# Patient Record
Sex: FEMALE | Race: WHITE | NOT HISPANIC OR LATINO | Employment: OTHER | URBAN - METROPOLITAN AREA
[De-identification: names, ages, dates, MRNs, and addresses within clinical notes are randomized per-mention and may not be internally consistent; named-entity substitution may affect disease eponyms.]

---

## 2017-08-23 ENCOUNTER — HOSPITAL ENCOUNTER (EMERGENCY)
Facility: HOSPITAL | Age: 82
Discharge: HOME | End: 2017-08-24
Attending: EMERGENCY MEDICINE | Admitting: EMERGENCY MEDICINE

## 2017-08-23 DIAGNOSIS — R53.1 WEAKNESS: Primary | ICD-10-CM

## 2017-08-23 PROCEDURE — 99284 EMERGENCY DEPT VISIT MOD MDM: CPT

## 2017-08-23 RX ORDER — GALANTAMINE HYDROBROMIDE 8 MG/1
8 TABLET, FILM COATED ORAL DAILY
COMMUNITY

## 2017-08-24 VITALS
OXYGEN SATURATION: 99 % | RESPIRATION RATE: 18 BRPM | BODY MASS INDEX: 23.41 KG/M2 | WEIGHT: 124 LBS | TEMPERATURE: 96.3 F | SYSTOLIC BLOOD PRESSURE: 144 MMHG | DIASTOLIC BLOOD PRESSURE: 75 MMHG | HEIGHT: 61 IN | HEART RATE: 85 BPM

## 2017-08-24 NOTE — ED PROVIDER NOTES
" EMERGENCY DEPARTMENT ENCOUNTER    CHIEF COMPLAINT  Chief Complaint: Fatigue  History given by: patient   History limited by: vague historian   Room Number: 17/17  PMD: Noemi Chester MD      HPI:  Pt is a 89 y.o. female who presents complaining of fatigue. Pt states that she has been in bed all day. Pt states that she feels sick in her head and in her stomach. Pt states that she has not vomited, and is unable to elaborate other than she \"just feels sick\". Pt states that she started was started on Aricept 2 weeks ago, but she has not taken it regularly. Pt states that she took the medication this morning along with \"a bunch of vitamins\". Pt denies any pain at this time.     Onset: gradual  Timing: constant   Location: n/a  Radiation: n/a  Quality: feels sick in her head and abd   Intensity/Severity: moderate  Progression: unchanged   Associated Symptoms: none  Aggravating Factors: none  Alleviating Factors: none  Previous Episodes: none  Treatment before arrival: started on Aricept    PAST MEDICAL HISTORY  Active Ambulatory Problems     Diagnosis Date Noted   • No Active Ambulatory Problems     Resolved Ambulatory Problems     Diagnosis Date Noted   • No Resolved Ambulatory Problems     Past Medical History:   Diagnosis Date   • Prediabetes        PAST SURGICAL HISTORY  Past Surgical History:   Procedure Laterality Date   • BACK SURGERY     • KNEE SURGERY         FAMILY HISTORY  History reviewed. No pertinent family history.    SOCIAL HISTORY  Social History     Social History   • Marital status:      Spouse name: N/A   • Number of children: N/A   • Years of education: N/A     Occupational History   • Not on file.     Social History Main Topics   • Smoking status: Never Smoker   • Smokeless tobacco: Not on file   • Alcohol use No   • Drug use: No   • Sexual activity: Defer     Other Topics Concern   • Not on file     Social History Narrative   • No narrative on file       ALLERGIES  Review of patient's " allergies indicates no known allergies.    REVIEW OF SYSTEMS  Review of Systems   Constitutional: Positive for fatigue (feels sick). Negative for chills and fever.   HENT: Negative for sore throat.    Respiratory: Negative for cough and shortness of breath.    Cardiovascular: Negative for chest pain.   Gastrointestinal: Negative for abdominal pain, nausea and vomiting.   Genitourinary: Negative for dysuria.   Musculoskeletal: Negative for back pain.   Psychiatric/Behavioral: The patient is not nervous/anxious.        PHYSICAL EXAM  ED Triage Vitals   Temp Heart Rate Resp BP SpO2   08/23/17 2138 08/23/17 2138 08/23/17 2138 08/23/17 2139 08/23/17 2138   96.3 °F (35.7 °C) 93 18 145/86 100 %      Temp src Heart Rate Source Patient Position BP Location FiO2 (%)   08/23/17 2138 08/23/17 2138 08/23/17 2139 08/23/17 2139 --   Tympanic Monitor Sitting Left arm        Physical Exam   Constitutional: She is oriented to person, place, and time and well-developed, well-nourished, and in no distress. No distress.   HENT:   Head: Normocephalic and atraumatic.   Mouth/Throat: Mucous membranes are dry.   Eyes: EOM are normal. Pupils are equal, round, and reactive to light.   Neck: Normal range of motion. Neck supple.   Cardiovascular: Normal rate, regular rhythm and normal heart sounds.    Pulmonary/Chest: Effort normal and breath sounds normal. No respiratory distress.   Abdominal: Soft. There is no tenderness. There is no rebound and no guarding.   Musculoskeletal: Normal range of motion. She exhibits no edema.   Neurological: She is alert and oriented to person, place, and time. She has normal sensation and normal strength.   Skin: Skin is warm and dry. No rash noted.   Psychiatric: Mood and affect normal. Her mood appears anxious.   Nursing note and vitals reviewed.    PROCEDURES  Procedures      PROGRESS AND CONSULTS  ED Course     23:36  BP- 144/67 HR- 82 Temp- 96.3 °F (35.7 °C) (Tympanic) O2 sat- 100%  Advised pt that is her  sx are due to a medication reaction, there is nothing to do for the sx. Plan to check orthostatic and feed pt. Pt understands and agrees with the plan, all questions answered.    23;38  Orthostatics ordered.     00:41  BP- 144/75 HR- 85 Temp- 96.3 °F (35.7 °C) (Tympanic) O2 sat- 99%  Rechecked the patient who is in NAD and is resting comfortably. Pt is not orthostatic, and reports that she felt that she had more strength upon standing. Pt will be discharged. Pt understands and agrees with the plan, all questions answered.    MEDICAL DECISION MAKING  Results were reviewed/discussed with the patient and they were also made aware of online access. Pt also made aware that some labs, such as cultures, will not be resulted during ER visit and follow up with PMD is necessary.     MDM  Number of Diagnoses or Management Options  Weakness:   Patient Progress  Patient progress: stable         DIAGNOSIS  Final diagnoses:   Weakness       DISPOSITION  DISCHARGE    Patient discharged in stable condition.    Reviewed implications of results, diagnosis, meds, responsibility to follow up, warning signs and symptoms of possible worsening, potential complications and reasons to return to ER.    Patient/Family voiced understanding of above instructions.    Discussed plan for discharge, as there is no emergent indication for admission.  Pt/family is agreeable and understands need for follow up and repeat testing.  Pt is aware that discharge does not mean that nothing is wrong but it indicates no emergency is present that requires admission and they must continue care with follow-up as given below or physician of their choice.     FOLLOW-UP  Noemi Chester MD  [address] [id number] [phone]               Medication List      Notice     No changes were made to your prescriptions during this visit.        Latest Documented Vital Signs:  As of 12:43 AM  BP- 144/75 HR- 85 Temp- 96.3 °F (35.7 °C) (Tympanic) O2 sat-  99%    --  Documentation assistance provided by adriel Mitchell for Dr Nelson.  Information recorded by the adriel was done at my direction and has been verified and validated by me.          Destinee Mitchell  08/24/17 0043       Nathaniel Nelson MD  08/24/17 0127

## 2017-08-24 NOTE — ED TRIAGE NOTES
Patient states that she just feels awful that she has had a change in her medications and states that she just feels sick in her head. I asked if she was in pain she stated no she just feels sick all over.

## 2020-05-23 ENCOUNTER — APPOINTMENT (OUTPATIENT)
Dept: GENERAL RADIOLOGY | Facility: HOSPITAL | Age: 85
End: 2020-05-23

## 2020-05-23 ENCOUNTER — HOSPITAL ENCOUNTER (EMERGENCY)
Facility: HOSPITAL | Age: 85
Discharge: HOME | End: 2020-05-23
Attending: EMERGENCY MEDICINE | Admitting: EMERGENCY MEDICINE

## 2020-05-23 ENCOUNTER — APPOINTMENT (OUTPATIENT)
Dept: CT IMAGING | Facility: HOSPITAL | Age: 85
End: 2020-05-23

## 2020-05-23 VITALS
RESPIRATION RATE: 16 BRPM | DIASTOLIC BLOOD PRESSURE: 70 MMHG | SYSTOLIC BLOOD PRESSURE: 129 MMHG | TEMPERATURE: 97.5 F | HEART RATE: 76 BPM | OXYGEN SATURATION: 98 %

## 2020-05-23 DIAGNOSIS — S52.551A OTHER CLOSED EXTRA-ARTICULAR FRACTURE OF DISTAL END OF RIGHT RADIUS, INITIAL ENCOUNTER: Primary | ICD-10-CM
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
## 2020-05-23 PROCEDURE — 73110 X-RAY EXAM OF WRIST: CPT

## 2020-05-23 PROCEDURE — 70450 CT HEAD/BRAIN W/O DYE: CPT

## 2020-05-23 PROCEDURE — 99284 EMERGENCY DEPT VISIT MOD MDM: CPT

## 2020-05-23 PROCEDURE — 73130 X-RAY EXAM OF HAND: CPT

## 2020-05-23 RX ORDER — HYDROCODONE BITARTRATE AND ACETAMINOPHEN 5; 325 MG/1; MG/1
1 TABLET ORAL EVERY 6 HOURS PRN
Status: DISCONTINUED | OUTPATIENT
Start: 2020-05-23 | End: 2020-05-23 | Stop reason: HOSPADM

## 2020-05-23 RX ADMIN — HYDROCODONE BITARTRATE AND ACETAMINOPHEN 1 TABLET: 5; 325 TABLET ORAL at 15:07
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
## 2020-05-23 NOTE — PROGRESS NOTES
Entered room, introduced self and explained role w/mask in place/; Verified information on facesheet; patient advises her daughter Bonita Cuadra can probably pick her up. Calls placed to emergency contacts with no answer. Left message; Patients son Joe called back to confirm daughters phone number. Call to home phone number has no answer, left message. Will continue to work with patient. Keiry Villarreal RN

## 2020-05-23 NOTE — DISCHARGE INSTRUCTIONS
Follow-up with Dr. Gregorio of orthopedics.  Call his office on Monday to schedule an appointment.  Wear splint at all times.  Apply ice as needed.  Elevate your right arm.  Take Tylenol as needed for pain.  Return to the emergency department for worsening pain, numbness in your fingers, or other concern.

## 2020-05-23 NOTE — PROGRESS NOTES
Discharge Planning Assessment  [facility]     Patient Name: Marcella Stephens  MRN: [id number]  Today's Date: 5/23/2020    Admit Date: 5/23/2020    Discharge Needs Assessment    No documentation.       Discharge Plan    No documentation.       Destination      Coordination has not been started for this encounter.      Durable Medical Equipment      Coordination has not been started for this encounter.      Dialysis/Infusion      Coordination has not been started for this encounter.      Home Medical Care      Coordination has not been started for this encounter.      Therapy      Coordination has not been started for this encounter.      Community Resources      Coordination has not been started for this encounter.          Demographic Summary    No documentation.       Functional Status    No documentation.       Psychosocial    No documentation.       Abuse/Neglect    No documentation.       Legal    No documentation.       Substance Abuse    No documentation.       Patient Forms    No documentation.           Keiry Villarreal RN

## 2020-05-23 NOTE — ED NOTES
Pt tripped at home and fell on the floor. Pt has deformity of right wrist. Pt was given 50mg Ketamine IM by EMS.     Pt arrived to ER wearing a facial mask.     Ally Claudio RN  05/23/20 5370

## 2020-05-23 NOTE — PROGRESS NOTES
No return call from family. Patient advised she doesn't have any money with her; however she is ambulatory- offered to cab patient home; Patient accepted./ Scheduled cab to transport patient home- provided cab voucher. Assisted patient to cab via w/c. No further needs at this time; - son Joe will be meeting patient at home to assist into house. Keiry Villarreal RN

## 2021-08-27 ENCOUNTER — APPOINTMENT (OUTPATIENT)
Dept: GENERAL RADIOLOGY | Facility: HOSPITAL | Age: 86
End: 2021-08-27

## 2021-08-27 ENCOUNTER — HOSPITAL ENCOUNTER (INPATIENT)
Facility: HOSPITAL | Age: 86
LOS: 8 days | Discharge: NURSING, REHAB OR LONG-TERM CARE | End: 2021-09-04
Attending: EMERGENCY MEDICINE | Admitting: INTERNAL MEDICINE

## 2021-08-27 DIAGNOSIS — T79.6XXA TRAUMATIC RHABDOMYOLYSIS, INITIAL ENCOUNTER (HCC): ICD-10-CM

## 2021-08-27 DIAGNOSIS — S72.001A CLOSED FRACTURE OF RIGHT HIP, INITIAL ENCOUNTER (HCC): Primary | ICD-10-CM

## 2021-08-27 LAB
ABO GROUP BLD: NORMAL
ALBUMIN SERPL-MCNC: 3.3 G/DL (ref 3.5–5.2)
ALBUMIN/GLOB SERPL: 1.1 G/DL
ALP SERPL-CCNC: 61 U/L (ref 39–117)
ALT SERPL W P-5'-P-CCNC: 20 U/L (ref 1–33)
ANION GAP SERPL CALCULATED.3IONS-SCNC: 11.5 MMOL/L (ref 5–15)
AST SERPL-CCNC: 38 U/L (ref 1–32)
BASOPHILS # BLD AUTO: 0.01 10*3/MM3 (ref 0–0.2)
BASOPHILS NFR BLD AUTO: 0.1 % (ref 0–1.5)
BILIRUB SERPL-MCNC: 1.4 MG/DL (ref 0–1.2)
BLD GP AB SCN SERPL QL: NEGATIVE
BUN SERPL-MCNC: 37 MG/DL (ref 8–23)
BUN/CREAT SERPL: 50.7 (ref 7–25)
CALCIUM SPEC-SCNC: 9.2 MG/DL (ref 8.2–9.6)
CHLORIDE SERPL-SCNC: 101 MMOL/L (ref 98–107)
CK SERPL-CCNC: 1273 U/L (ref 20–180)
CO2 SERPL-SCNC: 26.5 MMOL/L (ref 22–29)
CREAT SERPL-MCNC: 0.73 MG/DL (ref 0.57–1)
DEPRECATED RDW RBC AUTO: 44.3 FL (ref 37–54)
EOSINOPHIL # BLD AUTO: 0 10*3/MM3 (ref 0–0.4)
EOSINOPHIL NFR BLD AUTO: 0 % (ref 0.3–6.2)
ERYTHROCYTE [DISTWIDTH] IN BLOOD BY AUTOMATED COUNT: 14.1 % (ref 12.3–15.4)
GFR SERPL CREATININE-BSD FRML MDRD: 74 ML/MIN/1.73
GLOBULIN UR ELPH-MCNC: 3 GM/DL
GLUCOSE SERPL-MCNC: 117 MG/DL (ref 65–99)
HCT VFR BLD AUTO: 31.9 % (ref 34–46.6)
HGB BLD-MCNC: 10.5 G/DL (ref 12–15.9)
IMM GRANULOCYTES # BLD AUTO: 0.05 10*3/MM3 (ref 0–0.05)
IMM GRANULOCYTES NFR BLD AUTO: 0.6 % (ref 0–0.5)
INR PPP: 1.1 (ref 0.9–1.1)
LYMPHOCYTES # BLD AUTO: 0.97 10*3/MM3 (ref 0.7–3.1)
LYMPHOCYTES NFR BLD AUTO: 10.7 % (ref 19.6–45.3)
MCH RBC QN AUTO: 28.9 PG (ref 26.6–33)
MCHC RBC AUTO-ENTMCNC: 32.9 G/DL (ref 31.5–35.7)
MCV RBC AUTO: 87.9 FL (ref 79–97)
MONOCYTES # BLD AUTO: 0.76 10*3/MM3 (ref 0.1–0.9)
MONOCYTES NFR BLD AUTO: 8.4 % (ref 5–12)
NEUTROPHILS NFR BLD AUTO: 7.26 10*3/MM3 (ref 1.7–7)
NEUTROPHILS NFR BLD AUTO: 80.2 % (ref 42.7–76)
NRBC BLD AUTO-RTO: 0 /100 WBC (ref 0–0.2)
PLATELET # BLD AUTO: 159 10*3/MM3 (ref 140–450)
PMV BLD AUTO: 10.8 FL (ref 6–12)
POTASSIUM SERPL-SCNC: 4.4 MMOL/L (ref 3.5–5.2)
PROT SERPL-MCNC: 6.3 G/DL (ref 6–8.5)
PROTHROMBIN TIME: 14 SECONDS (ref 11.7–14.2)
RBC # BLD AUTO: 3.63 10*6/MM3 (ref 3.77–5.28)
RH BLD: POSITIVE
SARS-COV-2 RNA RESP QL NAA+PROBE: NOT DETECTED
SODIUM SERPL-SCNC: 139 MMOL/L (ref 136–145)
T&S EXPIRATION DATE: NORMAL
WBC # BLD AUTO: 9.05 10*3/MM3 (ref 3.4–10.8)

## 2021-08-27 PROCEDURE — 73110 X-RAY EXAM OF WRIST: CPT

## 2021-08-27 PROCEDURE — 73070 X-RAY EXAM OF ELBOW: CPT

## 2021-08-27 PROCEDURE — 85610 PROTHROMBIN TIME: CPT | Performed by: EMERGENCY MEDICINE

## 2021-08-27 PROCEDURE — 93005 ELECTROCARDIOGRAM TRACING: CPT | Performed by: EMERGENCY MEDICINE

## 2021-08-27 PROCEDURE — 82550 ASSAY OF CK (CPK): CPT | Performed by: EMERGENCY MEDICINE

## 2021-08-27 PROCEDURE — 93010 ELECTROCARDIOGRAM REPORT: CPT | Performed by: INTERNAL MEDICINE

## 2021-08-27 PROCEDURE — 73552 X-RAY EXAM OF FEMUR 2/>: CPT

## 2021-08-27 PROCEDURE — 80053 COMPREHEN METABOLIC PANEL: CPT | Performed by: EMERGENCY MEDICINE

## 2021-08-27 PROCEDURE — 25010000002 HYDROMORPHONE PER 4 MG: Performed by: EMERGENCY MEDICINE

## 2021-08-27 PROCEDURE — U0003 INFECTIOUS AGENT DETECTION BY NUCLEIC ACID (DNA OR RNA); SEVERE ACUTE RESPIRATORY SYNDROME CORONAVIRUS 2 (SARS-COV-2) (CORONAVIRUS DISEASE [COVID-19]), AMPLIFIED PROBE TECHNIQUE, MAKING USE OF HIGH THROUGHPUT TECHNOLOGIES AS DESCRIBED BY CMS-2020-01-R: HCPCS | Performed by: EMERGENCY MEDICINE

## 2021-08-27 PROCEDURE — 86901 BLOOD TYPING SEROLOGIC RH(D): CPT | Performed by: EMERGENCY MEDICINE

## 2021-08-27 PROCEDURE — 86900 BLOOD TYPING SEROLOGIC ABO: CPT | Performed by: EMERGENCY MEDICINE

## 2021-08-27 PROCEDURE — 99284 EMERGENCY DEPT VISIT MOD MDM: CPT

## 2021-08-27 PROCEDURE — U0005 INFEC AGEN DETEC AMPLI PROBE: HCPCS | Performed by: EMERGENCY MEDICINE

## 2021-08-27 PROCEDURE — 85025 COMPLETE CBC W/AUTO DIFF WBC: CPT | Performed by: EMERGENCY MEDICINE

## 2021-08-27 PROCEDURE — 25010000002 ONDANSETRON PER 1 MG: Performed by: EMERGENCY MEDICINE

## 2021-08-27 PROCEDURE — 86850 RBC ANTIBODY SCREEN: CPT | Performed by: EMERGENCY MEDICINE

## 2021-08-27 RX ORDER — SODIUM CHLORIDE 0.9 % (FLUSH) 0.9 %
10 SYRINGE (ML) INJECTION AS NEEDED
Status: DISCONTINUED | OUTPATIENT
Start: 2021-08-27 | End: 2021-09-04 | Stop reason: HOSPADM

## 2021-08-27 RX ORDER — NALOXONE HCL 0.4 MG/ML
0.4 VIAL (ML) INJECTION
Status: DISCONTINUED | OUTPATIENT
Start: 2021-08-27 | End: 2021-09-04 | Stop reason: HOSPADM

## 2021-08-27 RX ORDER — ACETAMINOPHEN 325 MG/1
650 TABLET ORAL EVERY 4 HOURS PRN
Status: DISCONTINUED | OUTPATIENT
Start: 2021-08-27 | End: 2021-09-04 | Stop reason: HOSPADM

## 2021-08-27 RX ORDER — CEFAZOLIN SODIUM 2 G/100ML
2 INJECTION, SOLUTION INTRAVENOUS ONCE
Status: COMPLETED | OUTPATIENT
Start: 2021-08-27 | End: 2021-08-28

## 2021-08-27 RX ORDER — HYDROMORPHONE HYDROCHLORIDE 1 MG/ML
0.5 INJECTION, SOLUTION INTRAMUSCULAR; INTRAVENOUS; SUBCUTANEOUS ONCE
Status: COMPLETED | OUTPATIENT
Start: 2021-08-27 | End: 2021-08-27

## 2021-08-27 RX ORDER — HYDROMORPHONE HYDROCHLORIDE 1 MG/ML
0.5 INJECTION, SOLUTION INTRAMUSCULAR; INTRAVENOUS; SUBCUTANEOUS
Status: DISPENSED | OUTPATIENT
Start: 2021-08-27 | End: 2021-09-03

## 2021-08-27 RX ORDER — ONDANSETRON 2 MG/ML
4 INJECTION INTRAMUSCULAR; INTRAVENOUS EVERY 6 HOURS PRN
Status: DISCONTINUED | OUTPATIENT
Start: 2021-08-27 | End: 2021-09-04 | Stop reason: HOSPADM

## 2021-08-27 RX ORDER — ONDANSETRON 2 MG/ML
4 INJECTION INTRAMUSCULAR; INTRAVENOUS ONCE
Status: COMPLETED | OUTPATIENT
Start: 2021-08-27 | End: 2021-08-27

## 2021-08-27 RX ORDER — ONDANSETRON 4 MG/1
4 TABLET, FILM COATED ORAL EVERY 6 HOURS PRN
Status: DISCONTINUED | OUTPATIENT
Start: 2021-08-27 | End: 2021-09-04 | Stop reason: HOSPADM

## 2021-08-27 RX ORDER — HYDROCODONE BITARTRATE AND ACETAMINOPHEN 5; 325 MG/1; MG/1
1 TABLET ORAL EVERY 4 HOURS PRN
Status: DISCONTINUED | OUTPATIENT
Start: 2021-08-27 | End: 2021-09-04 | Stop reason: HOSPADM

## 2021-08-27 RX ORDER — UREA 10 %
3 LOTION (ML) TOPICAL NIGHTLY PRN
Status: DISCONTINUED | OUTPATIENT
Start: 2021-08-27 | End: 2021-09-04 | Stop reason: HOSPADM

## 2021-08-27 RX ORDER — SODIUM CHLORIDE 9 MG/ML
75 INJECTION, SOLUTION INTRAVENOUS CONTINUOUS
Status: DISCONTINUED | OUTPATIENT
Start: 2021-08-27 | End: 2021-08-29

## 2021-08-27 RX ADMIN — SODIUM CHLORIDE 75 ML/HR: 9 INJECTION, SOLUTION INTRAVENOUS at 20:05

## 2021-08-27 RX ADMIN — SODIUM CHLORIDE 1000 ML: 9 INJECTION, SOLUTION INTRAVENOUS at 16:37

## 2021-08-27 RX ADMIN — HYDROMORPHONE HYDROCHLORIDE 0.5 MG: 1 INJECTION, SOLUTION INTRAMUSCULAR; INTRAVENOUS; SUBCUTANEOUS at 17:12

## 2021-08-27 RX ADMIN — ONDANSETRON 4 MG: 2 INJECTION INTRAMUSCULAR; INTRAVENOUS at 17:12

## 2021-08-28 ENCOUNTER — APPOINTMENT (OUTPATIENT)
Dept: GENERAL RADIOLOGY | Facility: HOSPITAL | Age: 86
End: 2021-08-28

## 2021-08-28 ENCOUNTER — ANESTHESIA EVENT (OUTPATIENT)
Dept: PERIOP | Facility: HOSPITAL | Age: 86
End: 2021-08-28

## 2021-08-28 ENCOUNTER — ANESTHESIA (OUTPATIENT)
Dept: PERIOP | Facility: HOSPITAL | Age: 86
End: 2021-08-28

## 2021-08-28 PROBLEM — T79.6XXA TRAUMATIC RHABDOMYOLYSIS (HCC): Status: ACTIVE | Noted: 2021-08-28

## 2021-08-28 LAB
ANION GAP SERPL CALCULATED.3IONS-SCNC: 9.8 MMOL/L (ref 5–15)
BUN SERPL-MCNC: 44 MG/DL (ref 8–23)
BUN/CREAT SERPL: 63.8 (ref 7–25)
CALCIUM SPEC-SCNC: 8.8 MG/DL (ref 8.2–9.6)
CHLORIDE SERPL-SCNC: 105 MMOL/L (ref 98–107)
CK SERPL-CCNC: 654 U/L (ref 20–180)
CK SERPL-CCNC: 851 U/L (ref 20–180)
CO2 SERPL-SCNC: 24.2 MMOL/L (ref 22–29)
CREAT SERPL-MCNC: 0.69 MG/DL (ref 0.57–1)
DEPRECATED RDW RBC AUTO: 46.1 FL (ref 37–54)
ERYTHROCYTE [DISTWIDTH] IN BLOOD BY AUTOMATED COUNT: 14.3 % (ref 12.3–15.4)
GFR SERPL CREATININE-BSD FRML MDRD: 79 ML/MIN/1.73
GLUCOSE SERPL-MCNC: 98 MG/DL (ref 65–99)
HCT VFR BLD AUTO: 31.1 % (ref 34–46.6)
HGB BLD-MCNC: 10.3 G/DL (ref 12–15.9)
MCH RBC QN AUTO: 29.4 PG (ref 26.6–33)
MCHC RBC AUTO-ENTMCNC: 33.1 G/DL (ref 31.5–35.7)
MCV RBC AUTO: 88.9 FL (ref 79–97)
PLATELET # BLD AUTO: 148 10*3/MM3 (ref 140–450)
PMV BLD AUTO: 10.6 FL (ref 6–12)
POTASSIUM SERPL-SCNC: 4.3 MMOL/L (ref 3.5–5.2)
QT INTERVAL: 375 MS
RBC # BLD AUTO: 3.5 10*6/MM3 (ref 3.77–5.28)
SODIUM SERPL-SCNC: 139 MMOL/L (ref 136–145)
WBC # BLD AUTO: 7.46 10*3/MM3 (ref 3.4–10.8)

## 2021-08-28 PROCEDURE — 73502 X-RAY EXAM HIP UNI 2-3 VIEWS: CPT | Performed by: ORTHOPAEDIC SURGERY

## 2021-08-28 PROCEDURE — 80048 BASIC METABOLIC PNL TOTAL CA: CPT | Performed by: INTERNAL MEDICINE

## 2021-08-28 PROCEDURE — 25010000002 NEOSTIGMINE 5 MG/10ML SOLUTION: Performed by: NURSE ANESTHETIST, CERTIFIED REGISTERED

## 2021-08-28 PROCEDURE — C1713 ANCHOR/SCREW BN/BN,TIS/BN: HCPCS | Performed by: ORTHOPAEDIC SURGERY

## 2021-08-28 PROCEDURE — 99222 1ST HOSP IP/OBS MODERATE 55: CPT | Performed by: ORTHOPAEDIC SURGERY

## 2021-08-28 PROCEDURE — 97166 OT EVAL MOD COMPLEX 45 MIN: CPT

## 2021-08-28 PROCEDURE — 25010000002 PHENYLEPHRINE PER 1 ML: Performed by: NURSE ANESTHETIST, CERTIFIED REGISTERED

## 2021-08-28 PROCEDURE — 25010000002 HYDROMORPHONE PER 4 MG: Performed by: INTERNAL MEDICINE

## 2021-08-28 PROCEDURE — 76000 FLUOROSCOPY <1 HR PHYS/QHP: CPT

## 2021-08-28 PROCEDURE — 25010000002 PROPOFOL 10 MG/ML EMULSION: Performed by: NURSE ANESTHETIST, CERTIFIED REGISTERED

## 2021-08-28 PROCEDURE — 82550 ASSAY OF CK (CPK): CPT | Performed by: INTERNAL MEDICINE

## 2021-08-28 PROCEDURE — 25010000003 LIDOCAINE 1 % SOLUTION 20 ML VIAL: Performed by: ORTHOPAEDIC SURGERY

## 2021-08-28 PROCEDURE — 73502 X-RAY EXAM HIP UNI 2-3 VIEWS: CPT

## 2021-08-28 PROCEDURE — 25010000002 FENTANYL CITRATE (PF) 50 MCG/ML SOLUTION: Performed by: NURSE ANESTHETIST, CERTIFIED REGISTERED

## 2021-08-28 PROCEDURE — 85027 COMPLETE CBC AUTOMATED: CPT | Performed by: INTERNAL MEDICINE

## 2021-08-28 PROCEDURE — C1769 GUIDE WIRE: HCPCS | Performed by: ORTHOPAEDIC SURGERY

## 2021-08-28 PROCEDURE — 25010000002 HYDROMORPHONE PER 4 MG: Performed by: NURSE ANESTHETIST, CERTIFIED REGISTERED

## 2021-08-28 PROCEDURE — 0QS606Z REPOSITION RIGHT UPPER FEMUR WITH INTRAMEDULLARY INTERNAL FIXATION DEVICE, OPEN APPROACH: ICD-10-PCS | Performed by: INTERNAL MEDICINE

## 2021-08-28 PROCEDURE — 27245 TREAT THIGH FRACTURE: CPT | Performed by: ORTHOPAEDIC SURGERY

## 2021-08-28 PROCEDURE — 25010000003 CEFAZOLIN IN DEXTROSE 2-4 GM/100ML-% SOLUTION: Performed by: ORTHOPAEDIC SURGERY

## 2021-08-28 PROCEDURE — 97530 THERAPEUTIC ACTIVITIES: CPT

## 2021-08-28 PROCEDURE — 25010000002 DEXAMETHASONE PER 1 MG: Performed by: NURSE ANESTHETIST, CERTIFIED REGISTERED

## 2021-08-28 PROCEDURE — 25010000002 ONDANSETRON PER 1 MG: Performed by: NURSE ANESTHETIST, CERTIFIED REGISTERED

## 2021-08-28 DEVICE — K-WIRE: Type: IMPLANTABLE DEVICE | Site: TROCHANTER | Status: FUNCTIONAL

## 2021-08-28 DEVICE — LOCKING SCREW, FULLY THREADED: Type: IMPLANTABLE DEVICE | Site: TROCHANTER | Status: FUNCTIONAL

## 2021-08-28 DEVICE — LONG NAIL KIT R1.5, TI, RIGHT
Type: IMPLANTABLE DEVICE | Site: TROCHANTER | Status: FUNCTIONAL
Brand: GAMMA

## 2021-08-28 DEVICE — LAG SCREW, TI
Type: IMPLANTABLE DEVICE | Site: TROCHANTER | Status: FUNCTIONAL
Brand: GAMMA

## 2021-08-28 RX ORDER — SODIUM CHLORIDE 0.9 % (FLUSH) 0.9 %
3-10 SYRINGE (ML) INJECTION AS NEEDED
Status: DISCONTINUED | OUTPATIENT
Start: 2021-08-28 | End: 2021-08-28 | Stop reason: HOSPADM

## 2021-08-28 RX ORDER — LABETALOL HYDROCHLORIDE 5 MG/ML
5 INJECTION, SOLUTION INTRAVENOUS
Status: DISCONTINUED | OUTPATIENT
Start: 2021-08-28 | End: 2021-08-28 | Stop reason: HOSPADM

## 2021-08-28 RX ORDER — LIDOCAINE HYDROCHLORIDE 10 MG/ML
0.5 INJECTION, SOLUTION EPIDURAL; INFILTRATION; INTRACAUDAL; PERINEURAL ONCE AS NEEDED
Status: DISCONTINUED | OUTPATIENT
Start: 2021-08-28 | End: 2021-08-28 | Stop reason: HOSPADM

## 2021-08-28 RX ORDER — NEOSTIGMINE METHYLSULFATE 0.5 MG/ML
INJECTION, SOLUTION INTRAVENOUS AS NEEDED
Status: DISCONTINUED | OUTPATIENT
Start: 2021-08-28 | End: 2021-08-28 | Stop reason: SURG

## 2021-08-28 RX ORDER — ASPIRIN 325 MG
325 TABLET, DELAYED RELEASE (ENTERIC COATED) ORAL DAILY
Status: DISCONTINUED | OUTPATIENT
Start: 2021-08-29 | End: 2021-09-01

## 2021-08-28 RX ORDER — SODIUM CHLORIDE 0.9 % (FLUSH) 0.9 %
3 SYRINGE (ML) INJECTION EVERY 12 HOURS SCHEDULED
Status: DISCONTINUED | OUTPATIENT
Start: 2021-08-28 | End: 2021-09-04 | Stop reason: HOSPADM

## 2021-08-28 RX ORDER — HYDROMORPHONE HYDROCHLORIDE 1 MG/ML
0.5 INJECTION, SOLUTION INTRAMUSCULAR; INTRAVENOUS; SUBCUTANEOUS
Status: DISCONTINUED | OUTPATIENT
Start: 2021-08-28 | End: 2021-08-28 | Stop reason: HOSPADM

## 2021-08-28 RX ORDER — DEXAMETHASONE SODIUM PHOSPHATE 10 MG/ML
INJECTION INTRAMUSCULAR; INTRAVENOUS AS NEEDED
Status: DISCONTINUED | OUTPATIENT
Start: 2021-08-28 | End: 2021-08-28 | Stop reason: SURG

## 2021-08-28 RX ORDER — FAMOTIDINE 10 MG/ML
20 INJECTION, SOLUTION INTRAVENOUS ONCE
Status: COMPLETED | OUTPATIENT
Start: 2021-08-28 | End: 2021-08-28

## 2021-08-28 RX ORDER — DOCUSATE SODIUM 100 MG/1
100 CAPSULE, LIQUID FILLED ORAL 2 TIMES DAILY
Status: DISCONTINUED | OUTPATIENT
Start: 2021-08-28 | End: 2021-09-04 | Stop reason: HOSPADM

## 2021-08-28 RX ORDER — SODIUM CHLORIDE, SODIUM LACTATE, POTASSIUM CHLORIDE, CALCIUM CHLORIDE 600; 310; 30; 20 MG/100ML; MG/100ML; MG/100ML; MG/100ML
125 INJECTION, SOLUTION INTRAVENOUS CONTINUOUS
Status: DISCONTINUED | OUTPATIENT
Start: 2021-08-28 | End: 2021-09-01

## 2021-08-28 RX ORDER — LIDOCAINE HYDROCHLORIDE 20 MG/ML
INJECTION, SOLUTION INFILTRATION; PERINEURAL AS NEEDED
Status: DISCONTINUED | OUTPATIENT
Start: 2021-08-28 | End: 2021-08-28 | Stop reason: SURG

## 2021-08-28 RX ORDER — FENTANYL CITRATE 50 UG/ML
50 INJECTION, SOLUTION INTRAMUSCULAR; INTRAVENOUS
Status: DISCONTINUED | OUTPATIENT
Start: 2021-08-28 | End: 2021-08-28 | Stop reason: HOSPADM

## 2021-08-28 RX ORDER — ONDANSETRON 2 MG/ML
INJECTION INTRAMUSCULAR; INTRAVENOUS AS NEEDED
Status: DISCONTINUED | OUTPATIENT
Start: 2021-08-28 | End: 2021-08-28 | Stop reason: SURG

## 2021-08-28 RX ORDER — NALOXONE HCL 0.4 MG/ML
0.2 VIAL (ML) INJECTION AS NEEDED
Status: DISCONTINUED | OUTPATIENT
Start: 2021-08-28 | End: 2021-08-28 | Stop reason: HOSPADM

## 2021-08-28 RX ORDER — CEFAZOLIN SODIUM 2 G/100ML
2 INJECTION, SOLUTION INTRAVENOUS EVERY 8 HOURS
Status: COMPLETED | OUTPATIENT
Start: 2021-08-28 | End: 2021-08-29

## 2021-08-28 RX ORDER — EPHEDRINE SULFATE 50 MG/ML
5 INJECTION, SOLUTION INTRAVENOUS ONCE AS NEEDED
Status: DISCONTINUED | OUTPATIENT
Start: 2021-08-28 | End: 2021-08-28 | Stop reason: HOSPADM

## 2021-08-28 RX ORDER — ACETAMINOPHEN 325 MG/1
650 TABLET ORAL EVERY 4 HOURS PRN
Status: DISCONTINUED | OUTPATIENT
Start: 2021-08-28 | End: 2021-09-04 | Stop reason: HOSPADM

## 2021-08-28 RX ORDER — HYDRALAZINE HYDROCHLORIDE 20 MG/ML
5 INJECTION INTRAMUSCULAR; INTRAVENOUS
Status: DISCONTINUED | OUTPATIENT
Start: 2021-08-28 | End: 2021-08-28 | Stop reason: HOSPADM

## 2021-08-28 RX ORDER — SODIUM CHLORIDE 0.9 % (FLUSH) 0.9 %
10 SYRINGE (ML) INJECTION AS NEEDED
Status: DISCONTINUED | OUTPATIENT
Start: 2021-08-28 | End: 2021-08-30

## 2021-08-28 RX ORDER — PROPOFOL 10 MG/ML
VIAL (ML) INTRAVENOUS AS NEEDED
Status: DISCONTINUED | OUTPATIENT
Start: 2021-08-28 | End: 2021-08-28 | Stop reason: SURG

## 2021-08-28 RX ORDER — HYDROCODONE BITARTRATE AND ACETAMINOPHEN 7.5; 325 MG/1; MG/1
1 TABLET ORAL ONCE AS NEEDED
Status: DISCONTINUED | OUTPATIENT
Start: 2021-08-28 | End: 2021-08-28 | Stop reason: HOSPADM

## 2021-08-28 RX ORDER — GLYCOPYRROLATE 0.2 MG/ML
INJECTION INTRAMUSCULAR; INTRAVENOUS AS NEEDED
Status: DISCONTINUED | OUTPATIENT
Start: 2021-08-28 | End: 2021-08-28 | Stop reason: SURG

## 2021-08-28 RX ORDER — FLUMAZENIL 0.1 MG/ML
0.2 INJECTION INTRAVENOUS AS NEEDED
Status: DISCONTINUED | OUTPATIENT
Start: 2021-08-28 | End: 2021-08-28 | Stop reason: HOSPADM

## 2021-08-28 RX ORDER — HYDROMORPHONE HCL 110MG/55ML
PATIENT CONTROLLED ANALGESIA SYRINGE INTRAVENOUS AS NEEDED
Status: DISCONTINUED | OUTPATIENT
Start: 2021-08-28 | End: 2021-08-28 | Stop reason: SURG

## 2021-08-28 RX ORDER — MAGNESIUM HYDROXIDE 1200 MG/15ML
LIQUID ORAL AS NEEDED
Status: DISCONTINUED | OUTPATIENT
Start: 2021-08-28 | End: 2021-08-28 | Stop reason: HOSPADM

## 2021-08-28 RX ORDER — ONDANSETRON 2 MG/ML
4 INJECTION INTRAMUSCULAR; INTRAVENOUS ONCE AS NEEDED
Status: DISCONTINUED | OUTPATIENT
Start: 2021-08-28 | End: 2021-08-28 | Stop reason: HOSPADM

## 2021-08-28 RX ORDER — SODIUM CHLORIDE 0.9 % (FLUSH) 0.9 %
3 SYRINGE (ML) INJECTION EVERY 12 HOURS SCHEDULED
Status: DISCONTINUED | OUTPATIENT
Start: 2021-08-28 | End: 2021-08-28 | Stop reason: HOSPADM

## 2021-08-28 RX ORDER — POLYETHYLENE GLYCOL 3350 17 G/17G
17 POWDER, FOR SOLUTION ORAL DAILY
Status: DISCONTINUED | OUTPATIENT
Start: 2021-08-28 | End: 2021-09-04 | Stop reason: HOSPADM

## 2021-08-28 RX ORDER — FENTANYL CITRATE 50 UG/ML
INJECTION, SOLUTION INTRAMUSCULAR; INTRAVENOUS AS NEEDED
Status: DISCONTINUED | OUTPATIENT
Start: 2021-08-28 | End: 2021-08-28 | Stop reason: SURG

## 2021-08-28 RX ORDER — SODIUM CHLORIDE, SODIUM LACTATE, POTASSIUM CHLORIDE, CALCIUM CHLORIDE 600; 310; 30; 20 MG/100ML; MG/100ML; MG/100ML; MG/100ML
9 INJECTION, SOLUTION INTRAVENOUS CONTINUOUS
Status: DISCONTINUED | OUTPATIENT
Start: 2021-08-28 | End: 2021-08-29

## 2021-08-28 RX ORDER — ROCURONIUM BROMIDE 10 MG/ML
INJECTION, SOLUTION INTRAVENOUS AS NEEDED
Status: DISCONTINUED | OUTPATIENT
Start: 2021-08-28 | End: 2021-08-28 | Stop reason: SURG

## 2021-08-28 RX ADMIN — POLYETHYLENE GLYCOL 3350 17 G: 17 POWDER, FOR SOLUTION ORAL at 12:58

## 2021-08-28 RX ADMIN — FENTANYL CITRATE 25 MCG: 50 INJECTION INTRAMUSCULAR; INTRAVENOUS at 08:24

## 2021-08-28 RX ADMIN — GLYCOPYRROLATE 0.3 MG: 0.2 INJECTION INTRAMUSCULAR; INTRAVENOUS at 09:33

## 2021-08-28 RX ADMIN — ONDANSETRON 4 MG: 2 INJECTION INTRAMUSCULAR; INTRAVENOUS at 09:20

## 2021-08-28 RX ADMIN — PROPOFOL 60 MG: 10 INJECTION, EMULSION INTRAVENOUS at 07:38

## 2021-08-28 RX ADMIN — DOCUSATE SODIUM 100 MG: 100 CAPSULE, LIQUID FILLED ORAL at 20:25

## 2021-08-28 RX ADMIN — HYDROMORPHONE HYDROCHLORIDE 0.25 MG: 2 INJECTION, SOLUTION INTRAMUSCULAR; INTRAVENOUS; SUBCUTANEOUS at 09:47

## 2021-08-28 RX ADMIN — NEOSTIGMINE METHYLSULFATE 2 MG: 0.5 INJECTION INTRAVENOUS at 09:33

## 2021-08-28 RX ADMIN — PHENYLEPHRINE HYDROCHLORIDE 100 MCG: 10 INJECTION INTRAVENOUS at 08:04

## 2021-08-28 RX ADMIN — SODIUM CHLORIDE, POTASSIUM CHLORIDE, SODIUM LACTATE AND CALCIUM CHLORIDE 100 ML/HR: 600; 310; 30; 20 INJECTION, SOLUTION INTRAVENOUS at 23:30

## 2021-08-28 RX ADMIN — DEXAMETHASONE SODIUM PHOSPHATE 8 MG: 10 INJECTION INTRAMUSCULAR; INTRAVENOUS at 08:26

## 2021-08-28 RX ADMIN — ROCURONIUM BROMIDE 10 MG: 50 INJECTION INTRAVENOUS at 07:38

## 2021-08-28 RX ADMIN — PHENYLEPHRINE HYDROCHLORIDE 100 MCG: 10 INJECTION INTRAVENOUS at 08:51

## 2021-08-28 RX ADMIN — DOCUSATE SODIUM 100 MG: 100 CAPSULE, LIQUID FILLED ORAL at 12:58

## 2021-08-28 RX ADMIN — FENTANYL CITRATE 25 MCG: 50 INJECTION INTRAMUSCULAR; INTRAVENOUS at 08:12

## 2021-08-28 RX ADMIN — FENTANYL CITRATE 25 MCG: 50 INJECTION INTRAMUSCULAR; INTRAVENOUS at 07:34

## 2021-08-28 RX ADMIN — CEFAZOLIN SODIUM 2 G: 2 INJECTION, SOLUTION INTRAVENOUS at 07:21

## 2021-08-28 RX ADMIN — LIDOCAINE HYDROCHLORIDE 60 MG: 20 INJECTION, SOLUTION INFILTRATION; PERINEURAL at 07:38

## 2021-08-28 RX ADMIN — CEFAZOLIN SODIUM 2 G: 2 INJECTION, SOLUTION INTRAVENOUS at 16:36

## 2021-08-28 RX ADMIN — SODIUM CHLORIDE, POTASSIUM CHLORIDE, SODIUM LACTATE AND CALCIUM CHLORIDE 9 ML/HR: 600; 310; 30; 20 INJECTION, SOLUTION INTRAVENOUS at 07:20

## 2021-08-28 RX ADMIN — FAMOTIDINE 20 MG: 10 INJECTION INTRAVENOUS at 07:20

## 2021-08-28 RX ADMIN — HYDROMORPHONE HYDROCHLORIDE 0.25 MG: 2 INJECTION, SOLUTION INTRAMUSCULAR; INTRAVENOUS; SUBCUTANEOUS at 09:36

## 2021-08-28 RX ADMIN — FENTANYL CITRATE 25 MCG: 50 INJECTION INTRAMUSCULAR; INTRAVENOUS at 09:27

## 2021-08-28 RX ADMIN — HYDROMORPHONE HYDROCHLORIDE 0.5 MG: 1 INJECTION, SOLUTION INTRAMUSCULAR; INTRAVENOUS; SUBCUTANEOUS at 02:06

## 2021-08-28 RX ADMIN — CEFAZOLIN SODIUM 2 G: 2 INJECTION, SOLUTION INTRAVENOUS at 23:30

## 2021-08-28 RX ADMIN — SODIUM CHLORIDE, PRESERVATIVE FREE 3 ML: 5 INJECTION INTRAVENOUS at 20:25

## 2021-08-28 NOTE — ANESTHESIA PREPROCEDURE EVALUATION
Anesthesia Evaluation     NPO Solid Status: > 8 hours             Airway   Mallampati: II  Dental      Pulmonary    (-) COPD, asthma, sleep apnea, not a smoker    ROS comment: Negative patient screen for MARCOS    Cardiovascular     (-) hypertension      Neuro/Psych  GI/Hepatic/Renal/Endo    (+)   diabetes mellitus (pre diabetic),     Musculoskeletal         ROS comment: ? Rhabdomyolysis, elevated CK on admission, rechecking labs  Abdominal    Substance History      OB/GYN          Other                        Anesthesia Plan    ASA 3     general       Anesthetic plan, all risks, benefits, and alternatives have been provided, discussed and informed consent has been obtained with: patient.

## 2021-08-28 NOTE — ANESTHESIA PROCEDURE NOTES
Airway  Urgency: elective    Date/Time: 8/28/2021 7:41 AM  Airway not difficult    General Information and Staff    Patient location during procedure: OR  Anesthesiologist: Louis Lee MD  CRNA: Karina Lund CRNA    Indications and Patient Condition  Indications for airway management: airway protection    Preoxygenated: yes  MILS maintained throughout  Mask difficulty assessment: 1 - vent by mask    Final Airway Details  Final airway type: endotracheal airway      Successful airway: ETT  Cuffed: yes   Successful intubation technique: direct laryngoscopy  Facilitating devices/methods: anterior pressure/BURP and Bougie  Endotracheal tube insertion site: oral  Blade: Dorsey  Blade size: 2  ETT size (mm): 6.5  Cormack-Lehane Classification: grade I - full view of glottis  Placement verified by: chest auscultation   Cuff volume (mL): 8  Measured from: lips  ETT/EBT  to lips (cm): 2  Number of attempts at approach: 1  Assessment: lips, teeth, and gum same as pre-op and atraumatic intubation    Additional Comments  Pt preoxygenated, SIVI, bag mask vent, ATETI with bougie d/t pt dry mouth and airway, dentition as before

## 2021-08-28 NOTE — ANESTHESIA POSTPROCEDURE EVALUATION
"Patient: Marcella Stephens    Procedure Summary     Date: 08/28/21 Room / Location: [facility] OR [address] [place] [address] MAIN OR    Anesthesia Start: 0727 Anesthesia Stop: 0947    Procedure: FEMUR INTRAMEDULLARY NAILING/RODDING (Right Thigh) Diagnosis:       Closed fracture of right hip, initial encounter (CMS/[facility])      (Closed fracture of right hip, initial encounter (CMS/[facility]) [S72.001A])    Surgeons: Louis Scruggs MD Provider: Louis Lee MD    Anesthesia Type: general ASA Status: 3          Anesthesia Type: general    Vitals  Vitals Value Taken Time   /57 08/28/21 1031   Temp 36.5 °C (97.7 °F) 08/28/21 0943   Pulse 71 08/28/21 1043   Resp 16 08/28/21 1030   SpO2 99 % 08/28/21 1043   Vitals shown include unvalidated device data.        Post Anesthesia Care and Evaluation    Patient location during evaluation: bedside  Pain management: adequate  Airway patency: patent  Anesthetic complications: No anesthetic complications    Cardiovascular status: acceptable  Respiratory status: acceptable  Hydration status: acceptable    Comments: /57 (BP Location: Left arm, Patient Position: Lying)   Pulse 73   Temp 36.5 °C (97.7 °F) (Oral)   Resp 16   Ht 157.5 cm (62\")   Wt 52.2 kg (115 lb)   SpO2 99%   BMI 21.03 kg/m²         "

## 2021-08-29 PROBLEM — F02.80 ALZHEIMER'S DEMENTIA (HCC): Status: ACTIVE | Noted: 2021-08-29
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
## 2021-08-29 PROBLEM — R73.9 ACUTE HYPERGLYCEMIA: Status: ACTIVE | Noted: 2021-08-29

## 2021-08-29 PROBLEM — G30.9 ALZHEIMER'S DEMENTIA (HCC): Status: ACTIVE | Noted: 2021-08-29
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
## 2021-08-29 LAB
25(OH)D3 SERPL-MCNC: 14.4 NG/ML (ref 30–100)
ANION GAP SERPL CALCULATED.3IONS-SCNC: 11.2 MMOL/L (ref 5–15)
BASOPHILS # BLD AUTO: 0.01 10*3/MM3 (ref 0–0.2)
BASOPHILS NFR BLD AUTO: 0.1 % (ref 0–1.5)
BUN SERPL-MCNC: 50 MG/DL (ref 8–23)
BUN/CREAT SERPL: 46.3 (ref 7–25)
CALCIUM SPEC-SCNC: 8.3 MG/DL (ref 8.2–9.6)
CHLORIDE SERPL-SCNC: 98 MMOL/L (ref 98–107)
CK SERPL-CCNC: 383 U/L (ref 20–180)
CO2 SERPL-SCNC: 21.8 MMOL/L (ref 22–29)
CREAT SERPL-MCNC: 1.08 MG/DL (ref 0.57–1)
DEPRECATED RDW RBC AUTO: 46.9 FL (ref 37–54)
EOSINOPHIL # BLD AUTO: 0.01 10*3/MM3 (ref 0–0.4)
EOSINOPHIL NFR BLD AUTO: 0.1 % (ref 0.3–6.2)
ERYTHROCYTE [DISTWIDTH] IN BLOOD BY AUTOMATED COUNT: 14.3 % (ref 12.3–15.4)
GFR SERPL CREATININE-BSD FRML MDRD: 47 ML/MIN/1.73
GLUCOSE SERPL-MCNC: 196 MG/DL (ref 65–99)
HBA1C MFR BLD: 5.53 % (ref 4.8–5.6)
HCT VFR BLD AUTO: 26.5 % (ref 34–46.6)
HGB BLD-MCNC: 8.6 G/DL (ref 12–15.9)
IRON 24H UR-MRATE: 25 MCG/DL (ref 37–145)
IRON SATN MFR SERPL: 9 % (ref 20–50)
LYMPHOCYTES # BLD AUTO: 1.22 10*3/MM3 (ref 0.7–3.1)
LYMPHOCYTES NFR BLD AUTO: 14.2 % (ref 19.6–45.3)
MAGNESIUM SERPL-MCNC: 2.3 MG/DL (ref 1.7–2.3)
MCH RBC QN AUTO: 29.3 PG (ref 26.6–33)
MCHC RBC AUTO-ENTMCNC: 32.5 G/DL (ref 31.5–35.7)
MCV RBC AUTO: 90.1 FL (ref 79–97)
MONOCYTES # BLD AUTO: 0.51 10*3/MM3 (ref 0.1–0.9)
MONOCYTES NFR BLD AUTO: 5.9 % (ref 5–12)
NEUTROPHILS NFR BLD AUTO: 6.81 10*3/MM3 (ref 1.7–7)
NEUTROPHILS NFR BLD AUTO: 79.2 % (ref 42.7–76)
PLATELET # BLD AUTO: 155 10*3/MM3 (ref 140–450)
PMV BLD AUTO: 11.2 FL (ref 6–12)
POTASSIUM SERPL-SCNC: 4.3 MMOL/L (ref 3.5–5.2)
RBC # BLD AUTO: 2.94 10*6/MM3 (ref 3.77–5.28)
SODIUM SERPL-SCNC: 131 MMOL/L (ref 136–145)
TIBC SERPL-MCNC: 267 MCG/DL (ref 298–536)
TRANSFERRIN SERPL-MCNC: 179 MG/DL (ref 200–360)
WBC # BLD AUTO: 8.6 10*3/MM3 (ref 3.4–10.8)

## 2021-08-29 PROCEDURE — 97110 THERAPEUTIC EXERCISES: CPT

## 2021-08-29 PROCEDURE — 84466 ASSAY OF TRANSFERRIN: CPT | Performed by: INTERNAL MEDICINE

## 2021-08-29 PROCEDURE — 83036 HEMOGLOBIN GLYCOSYLATED A1C: CPT | Performed by: INTERNAL MEDICINE

## 2021-08-29 PROCEDURE — 85025 COMPLETE CBC W/AUTO DIFF WBC: CPT | Performed by: ORTHOPAEDIC SURGERY

## 2021-08-29 PROCEDURE — 80048 BASIC METABOLIC PNL TOTAL CA: CPT | Performed by: ORTHOPAEDIC SURGERY

## 2021-08-29 PROCEDURE — 83735 ASSAY OF MAGNESIUM: CPT | Performed by: ORTHOPAEDIC SURGERY

## 2021-08-29 PROCEDURE — 99024 POSTOP FOLLOW-UP VISIT: CPT | Performed by: ORTHOPAEDIC SURGERY

## 2021-08-29 PROCEDURE — 82306 VITAMIN D 25 HYDROXY: CPT | Performed by: ORTHOPAEDIC SURGERY

## 2021-08-29 PROCEDURE — 83540 ASSAY OF IRON: CPT | Performed by: INTERNAL MEDICINE

## 2021-08-29 PROCEDURE — 97162 PT EVAL MOD COMPLEX 30 MIN: CPT

## 2021-08-29 PROCEDURE — 25010000002 IRON SUCROSE PER 1 MG: Performed by: INTERNAL MEDICINE

## 2021-08-29 PROCEDURE — 82550 ASSAY OF CK (CPK): CPT | Performed by: INTERNAL MEDICINE

## 2021-08-29 RX ORDER — ERGOCALCIFEROL 1.25 MG/1
50000 CAPSULE ORAL
Status: DISCONTINUED | OUTPATIENT
Start: 2021-08-29 | End: 2021-09-04 | Stop reason: HOSPADM

## 2021-08-29 RX ADMIN — HYDROCODONE BITARTRATE AND ACETAMINOPHEN 1 TABLET: 5; 325 TABLET ORAL at 09:11

## 2021-08-29 RX ADMIN — HYDROCODONE BITARTRATE AND ACETAMINOPHEN 1 TABLET: 5; 325 TABLET ORAL at 20:08

## 2021-08-29 RX ADMIN — ERGOCALCIFEROL 50000 UNITS: 1.25 CAPSULE ORAL at 16:24

## 2021-08-29 RX ADMIN — DOCUSATE SODIUM 100 MG: 100 CAPSULE, LIQUID FILLED ORAL at 09:11

## 2021-08-29 RX ADMIN — ASPIRIN 325 MG: 325 TABLET, COATED ORAL at 09:11

## 2021-08-29 RX ADMIN — IRON SUCROSE 200 MG: 20 INJECTION, SOLUTION INTRAVENOUS at 20:09

## 2021-08-29 RX ADMIN — SODIUM CHLORIDE, POTASSIUM CHLORIDE, SODIUM LACTATE AND CALCIUM CHLORIDE 100 ML/HR: 600; 310; 30; 20 INJECTION, SOLUTION INTRAVENOUS at 19:20

## 2021-08-29 RX ADMIN — POLYETHYLENE GLYCOL 3350 17 G: 17 POWDER, FOR SOLUTION ORAL at 09:11

## 2021-08-29 RX ADMIN — DOCUSATE SODIUM 100 MG: 100 CAPSULE, LIQUID FILLED ORAL at 20:08

## 2021-08-30 ENCOUNTER — APPOINTMENT (OUTPATIENT)
Dept: GENERAL RADIOLOGY | Facility: HOSPITAL | Age: 86
End: 2021-08-30

## 2021-08-30 ENCOUNTER — APPOINTMENT (OUTPATIENT)
Dept: ULTRASOUND IMAGING | Facility: HOSPITAL | Age: 86
End: 2021-08-30

## 2021-08-30 PROBLEM — E55.9 VITAMIN D DEFICIENCY: Status: ACTIVE | Noted: 2021-08-30

## 2021-08-30 PROBLEM — N17.9 ACUTE KIDNEY FAILURE (HCC): Status: ACTIVE | Noted: 2021-08-30

## 2021-08-30 PROBLEM — D64.9 ANEMIA: Status: ACTIVE | Noted: 2021-08-30

## 2021-08-30 LAB
ANION GAP SERPL CALCULATED.3IONS-SCNC: 10.8 MMOL/L (ref 5–15)
BASOPHILS # BLD AUTO: 0.01 10*3/MM3 (ref 0–0.2)
BASOPHILS NFR BLD AUTO: 0.1 % (ref 0–1.5)
BUN SERPL-MCNC: 63 MG/DL (ref 8–23)
BUN/CREAT SERPL: 34.4 (ref 7–25)
CALCIUM SPEC-SCNC: 8.4 MG/DL (ref 8.2–9.6)
CHLORIDE SERPL-SCNC: 99 MMOL/L (ref 98–107)
CO2 SERPL-SCNC: 21.2 MMOL/L (ref 22–29)
CREAT SERPL-MCNC: 1.83 MG/DL (ref 0.57–1)
DEPRECATED RDW RBC AUTO: 46.6 FL (ref 37–54)
EOSINOPHIL # BLD AUTO: 0.04 10*3/MM3 (ref 0–0.4)
EOSINOPHIL NFR BLD AUTO: 0.5 % (ref 0.3–6.2)
ERYTHROCYTE [DISTWIDTH] IN BLOOD BY AUTOMATED COUNT: 14.3 % (ref 12.3–15.4)
GFR SERPL CREATININE-BSD FRML MDRD: 26 ML/MIN/1.73
GLUCOSE SERPL-MCNC: 106 MG/DL (ref 65–99)
HCT VFR BLD AUTO: 24.4 % (ref 34–46.6)
HGB BLD-MCNC: 7.8 G/DL (ref 12–15.9)
LYMPHOCYTES # BLD AUTO: 1.37 10*3/MM3 (ref 0.7–3.1)
LYMPHOCYTES NFR BLD AUTO: 16.1 % (ref 19.6–45.3)
MCH RBC QN AUTO: 28.8 PG (ref 26.6–33)
MCHC RBC AUTO-ENTMCNC: 32 G/DL (ref 31.5–35.7)
MCV RBC AUTO: 90 FL (ref 79–97)
MONOCYTES # BLD AUTO: 0.74 10*3/MM3 (ref 0.1–0.9)
MONOCYTES NFR BLD AUTO: 8.7 % (ref 5–12)
NEUTROPHILS NFR BLD AUTO: 6.31 10*3/MM3 (ref 1.7–7)
NEUTROPHILS NFR BLD AUTO: 73.9 % (ref 42.7–76)
PLATELET # BLD AUTO: 107 10*3/MM3 (ref 140–450)
PMV BLD AUTO: 10.9 FL (ref 6–12)
POTASSIUM SERPL-SCNC: 4.6 MMOL/L (ref 3.5–5.2)
RBC # BLD AUTO: 2.71 10*6/MM3 (ref 3.77–5.28)
SODIUM SERPL-SCNC: 131 MMOL/L (ref 136–145)
WBC # BLD AUTO: 8.53 10*3/MM3 (ref 3.4–10.8)

## 2021-08-30 PROCEDURE — 76775 US EXAM ABDO BACK WALL LIM: CPT

## 2021-08-30 PROCEDURE — 80048 BASIC METABOLIC PNL TOTAL CA: CPT | Performed by: ORTHOPAEDIC SURGERY

## 2021-08-30 PROCEDURE — 25010000002 IRON SUCROSE PER 1 MG: Performed by: INTERNAL MEDICINE

## 2021-08-30 PROCEDURE — 99024 POSTOP FOLLOW-UP VISIT: CPT | Performed by: ORTHOPAEDIC SURGERY

## 2021-08-30 PROCEDURE — 86900 BLOOD TYPING SEROLOGIC ABO: CPT

## 2021-08-30 PROCEDURE — P9016 RBC LEUKOCYTES REDUCED: HCPCS

## 2021-08-30 PROCEDURE — 85025 COMPLETE CBC W/AUTO DIFF WBC: CPT | Performed by: ORTHOPAEDIC SURGERY

## 2021-08-30 PROCEDURE — 86923 COMPATIBILITY TEST ELECTRIC: CPT

## 2021-08-30 PROCEDURE — 97110 THERAPEUTIC EXERCISES: CPT

## 2021-08-30 PROCEDURE — 73060 X-RAY EXAM OF HUMERUS: CPT

## 2021-08-30 PROCEDURE — 36430 TRANSFUSION BLD/BLD COMPNT: CPT

## 2021-08-30 PROCEDURE — 97535 SELF CARE MNGMENT TRAINING: CPT

## 2021-08-30 PROCEDURE — 86901 BLOOD TYPING SEROLOGIC RH(D): CPT

## 2021-08-30 RX ORDER — ASPIRIN 325 MG
325 TABLET, DELAYED RELEASE (ENTERIC COATED) ORAL DAILY
Start: 2021-08-31 | End: 2021-09-04 | Stop reason: HOSPADM

## 2021-08-30 RX ORDER — ERGOCALCIFEROL 1.25 MG/1
50000 CAPSULE ORAL
Start: 2021-09-05

## 2021-08-30 RX ADMIN — ASPIRIN 325 MG: 325 TABLET, COATED ORAL at 08:13

## 2021-08-30 RX ADMIN — SODIUM CHLORIDE, POTASSIUM CHLORIDE, SODIUM LACTATE AND CALCIUM CHLORIDE 125 ML/HR: 600; 310; 30; 20 INJECTION, SOLUTION INTRAVENOUS at 20:45

## 2021-08-30 RX ADMIN — DOCUSATE SODIUM 100 MG: 100 CAPSULE, LIQUID FILLED ORAL at 20:12

## 2021-08-30 RX ADMIN — HYDROCODONE BITARTRATE AND ACETAMINOPHEN 1 TABLET: 5; 325 TABLET ORAL at 03:50

## 2021-08-30 RX ADMIN — SODIUM CHLORIDE, POTASSIUM CHLORIDE, SODIUM LACTATE AND CALCIUM CHLORIDE 100 ML/HR: 600; 310; 30; 20 INJECTION, SOLUTION INTRAVENOUS at 08:13

## 2021-08-30 RX ADMIN — POLYETHYLENE GLYCOL 3350 17 G: 17 POWDER, FOR SOLUTION ORAL at 08:13

## 2021-08-30 RX ADMIN — IRON SUCROSE 200 MG: 20 INJECTION, SOLUTION INTRAVENOUS at 20:45

## 2021-08-30 RX ADMIN — HYDROCODONE BITARTRATE AND ACETAMINOPHEN 1 TABLET: 5; 325 TABLET ORAL at 09:37

## 2021-08-30 RX ADMIN — HYDROCODONE BITARTRATE AND ACETAMINOPHEN 1 TABLET: 5; 325 TABLET ORAL at 17:59

## 2021-08-30 RX ADMIN — SODIUM CHLORIDE, PRESERVATIVE FREE 3 ML: 5 INJECTION INTRAVENOUS at 08:13

## 2021-08-30 RX ADMIN — DOCUSATE SODIUM 100 MG: 100 CAPSULE, LIQUID FILLED ORAL at 08:13

## 2021-08-31 ENCOUNTER — APPOINTMENT (OUTPATIENT)
Dept: GENERAL RADIOLOGY | Facility: HOSPITAL | Age: 86
End: 2021-08-31

## 2021-08-31 ENCOUNTER — APPOINTMENT (OUTPATIENT)
Dept: CARDIOLOGY | Facility: HOSPITAL | Age: 86
End: 2021-08-31

## 2021-08-31 ENCOUNTER — APPOINTMENT (OUTPATIENT)
Dept: MRI IMAGING | Facility: HOSPITAL | Age: 86
End: 2021-08-31

## 2021-08-31 PROBLEM — R29.898 WEAKNESS OF RIGHT UPPER EXTREMITY: Status: ACTIVE | Noted: 2021-08-31

## 2021-08-31 PROBLEM — R33.8 ACUTE RETENTION OF URINE: Status: ACTIVE | Noted: 2021-08-31

## 2021-08-31 PROBLEM — N13.30 HYDRONEPHROSIS: Status: ACTIVE | Noted: 2021-08-31

## 2021-08-31 LAB
ANION GAP SERPL CALCULATED.3IONS-SCNC: 9.3 MMOL/L (ref 5–15)
BASOPHILS # BLD AUTO: 0.02 10*3/MM3 (ref 0–0.2)
BASOPHILS NFR BLD AUTO: 0.3 % (ref 0–1.5)
BH BB BLOOD EXPIRATION DATE: NORMAL
BH BB BLOOD EXPIRATION DATE: NORMAL
BH BB BLOOD TYPE BARCODE: 5100
BH BB BLOOD TYPE BARCODE: 5100
BH BB DISPENSE STATUS: NORMAL
BH BB DISPENSE STATUS: NORMAL
BH BB PRODUCT CODE: NORMAL
BH BB PRODUCT CODE: NORMAL
BH BB UNIT NUMBER: NORMAL
BH BB UNIT NUMBER: NORMAL
BH CV UPPER VENOUS LEFT INTERNAL JUGULAR AUGMENT: NORMAL
BH CV UPPER VENOUS LEFT INTERNAL JUGULAR COMPETENT: NORMAL
BH CV UPPER VENOUS LEFT INTERNAL JUGULAR COMPRESS: NORMAL
BH CV UPPER VENOUS LEFT INTERNAL JUGULAR PHASIC: NORMAL
BH CV UPPER VENOUS LEFT INTERNAL JUGULAR SPONT: NORMAL
BH CV UPPER VENOUS LEFT SUBCLAVIAN AUGMENT: NORMAL
BH CV UPPER VENOUS LEFT SUBCLAVIAN COMPETENT: NORMAL
BH CV UPPER VENOUS LEFT SUBCLAVIAN COMPRESS: NORMAL
BH CV UPPER VENOUS LEFT SUBCLAVIAN PHASIC: NORMAL
BH CV UPPER VENOUS LEFT SUBCLAVIAN SPONT: NORMAL
BH CV UPPER VENOUS RIGHT AXILLARY AUGMENT: NORMAL
BH CV UPPER VENOUS RIGHT AXILLARY COMPETENT: NORMAL
BH CV UPPER VENOUS RIGHT AXILLARY COMPRESS: NORMAL
BH CV UPPER VENOUS RIGHT AXILLARY PHASIC: NORMAL
BH CV UPPER VENOUS RIGHT AXILLARY SPONT: NORMAL
BH CV UPPER VENOUS RIGHT BASILIC FOREARM COMPRESS: NORMAL
BH CV UPPER VENOUS RIGHT BASILIC UPPER COMPRESS: NORMAL
BH CV UPPER VENOUS RIGHT BRACHIAL COLOR: 1
BH CV UPPER VENOUS RIGHT BRACHIAL COMPRESS: NORMAL
BH CV UPPER VENOUS RIGHT BRACHIAL THROMBUS: NORMAL
BH CV UPPER VENOUS RIGHT CEPHALIC FOREARM COMPRESS: NORMAL
BH CV UPPER VENOUS RIGHT CEPHALIC UPPER COMPRESS: NORMAL
BH CV UPPER VENOUS RIGHT INTERNAL JUGULAR AUGMENT: NORMAL
BH CV UPPER VENOUS RIGHT INTERNAL JUGULAR COMPETENT: NORMAL
BH CV UPPER VENOUS RIGHT INTERNAL JUGULAR COMPRESS: NORMAL
BH CV UPPER VENOUS RIGHT INTERNAL JUGULAR PHASIC: NORMAL
BH CV UPPER VENOUS RIGHT INTERNAL JUGULAR SPONT: NORMAL
BH CV UPPER VENOUS RIGHT RADIAL COMPRESS: NORMAL
BH CV UPPER VENOUS RIGHT SUBCLAVIAN AUGMENT: NORMAL
BH CV UPPER VENOUS RIGHT SUBCLAVIAN COMPETENT: NORMAL
BH CV UPPER VENOUS RIGHT SUBCLAVIAN COMPRESS: NORMAL
BH CV UPPER VENOUS RIGHT SUBCLAVIAN PHASIC: NORMAL
BH CV UPPER VENOUS RIGHT SUBCLAVIAN SPONT: NORMAL
BH CV UPPER VENOUS RIGHT ULNAR COLOR: 1
BH CV UPPER VENOUS RIGHT ULNAR COMPRESS: NORMAL
BH CV UPPER VENOUS RIGHT ULNAR THROMBUS: NORMAL
BUN SERPL-MCNC: 46 MG/DL (ref 8–23)
BUN/CREAT SERPL: 48.9 (ref 7–25)
CALCIUM SPEC-SCNC: 8.3 MG/DL (ref 8.2–9.6)
CHLORIDE SERPL-SCNC: 104 MMOL/L (ref 98–107)
CK SERPL-CCNC: 201 U/L (ref 20–180)
CO2 SERPL-SCNC: 20.7 MMOL/L (ref 22–29)
CREAT SERPL-MCNC: 0.94 MG/DL (ref 0.57–1)
CROSSMATCH INTERPRETATION: NORMAL
CROSSMATCH INTERPRETATION: NORMAL
DEPRECATED RDW RBC AUTO: 48.2 FL (ref 37–54)
EOSINOPHIL # BLD AUTO: 0.21 10*3/MM3 (ref 0–0.4)
EOSINOPHIL NFR BLD AUTO: 2.8 % (ref 0.3–6.2)
ERYTHROCYTE [DISTWIDTH] IN BLOOD BY AUTOMATED COUNT: 14.3 % (ref 12.3–15.4)
FERRITIN SERPL-MCNC: 263 NG/ML (ref 13–150)
FOLATE SERPL-MCNC: 5.78 NG/ML (ref 4.78–24.2)
GFR SERPL CREATININE-BSD FRML MDRD: 55 ML/MIN/1.73
GLUCOSE SERPL-MCNC: 83 MG/DL (ref 65–99)
HCT VFR BLD AUTO: 33.8 % (ref 34–46.6)
HEMOCCULT STL QL: POSITIVE
HGB BLD-MCNC: 10.6 G/DL (ref 12–15.9)
IMM GRANULOCYTES # BLD AUTO: 0.05 10*3/MM3 (ref 0–0.05)
IMM GRANULOCYTES NFR BLD AUTO: 0.7 % (ref 0–0.5)
LYMPHOCYTES # BLD AUTO: 1.21 10*3/MM3 (ref 0.7–3.1)
LYMPHOCYTES NFR BLD AUTO: 15.9 % (ref 19.6–45.3)
MCH RBC QN AUTO: 28.7 PG (ref 26.6–33)
MCHC RBC AUTO-ENTMCNC: 31.4 G/DL (ref 31.5–35.7)
MCV RBC AUTO: 91.6 FL (ref 79–97)
MONOCYTES # BLD AUTO: 0.49 10*3/MM3 (ref 0.1–0.9)
MONOCYTES NFR BLD AUTO: 6.4 % (ref 5–12)
NEUTROPHILS NFR BLD AUTO: 5.63 10*3/MM3 (ref 1.7–7)
NEUTROPHILS NFR BLD AUTO: 73.9 % (ref 42.7–76)
NRBC BLD AUTO-RTO: 0 /100 WBC (ref 0–0.2)
PLATELET # BLD AUTO: 84 10*3/MM3 (ref 140–450)
PMV BLD AUTO: 11.4 FL (ref 6–12)
POTASSIUM SERPL-SCNC: 4.3 MMOL/L (ref 3.5–5.2)
RBC # BLD AUTO: 3.69 10*6/MM3 (ref 3.77–5.28)
RETICS # AUTO: 0.06 10*6/MM3 (ref 0.02–0.13)
RETICS/RBC NFR AUTO: 1.76 % (ref 0.7–1.9)
SODIUM SERPL-SCNC: 134 MMOL/L (ref 136–145)
UNIT  ABO: NORMAL
UNIT  ABO: NORMAL
UNIT  RH: NORMAL
UNIT  RH: NORMAL
VIT B12 BLD-MCNC: 307 PG/ML (ref 211–946)
WBC # BLD AUTO: 7.61 10*3/MM3 (ref 3.4–10.8)

## 2021-08-31 PROCEDURE — 82728 ASSAY OF FERRITIN: CPT | Performed by: INTERNAL MEDICINE

## 2021-08-31 PROCEDURE — 85025 COMPLETE CBC W/AUTO DIFF WBC: CPT | Performed by: INTERNAL MEDICINE

## 2021-08-31 PROCEDURE — 82607 VITAMIN B-12: CPT | Performed by: INTERNAL MEDICINE

## 2021-08-31 PROCEDURE — 82746 ASSAY OF FOLIC ACID SERUM: CPT | Performed by: INTERNAL MEDICINE

## 2021-08-31 PROCEDURE — 99024 POSTOP FOLLOW-UP VISIT: CPT | Performed by: ORTHOPAEDIC SURGERY

## 2021-08-31 PROCEDURE — 85045 AUTOMATED RETICULOCYTE COUNT: CPT | Performed by: INTERNAL MEDICINE

## 2021-08-31 PROCEDURE — 93971 EXTREMITY STUDY: CPT

## 2021-08-31 PROCEDURE — 25010000002 IRON SUCROSE PER 1 MG: Performed by: INTERNAL MEDICINE

## 2021-08-31 PROCEDURE — 92610 EVALUATE SWALLOWING FUNCTION: CPT

## 2021-08-31 PROCEDURE — 25010000002 HYDROMORPHONE PER 4 MG: Performed by: ORTHOPAEDIC SURGERY

## 2021-08-31 PROCEDURE — 82272 OCCULT BLD FECES 1-3 TESTS: CPT | Performed by: INTERNAL MEDICINE

## 2021-08-31 PROCEDURE — 80048 BASIC METABOLIC PNL TOTAL CA: CPT | Performed by: INTERNAL MEDICINE

## 2021-08-31 PROCEDURE — 99222 1ST HOSP IP/OBS MODERATE 55: CPT | Performed by: NURSE PRACTITIONER

## 2021-08-31 PROCEDURE — 82550 ASSAY OF CK (CPK): CPT | Performed by: INTERNAL MEDICINE

## 2021-08-31 PROCEDURE — 71046 X-RAY EXAM CHEST 2 VIEWS: CPT

## 2021-08-31 PROCEDURE — 73030 X-RAY EXAM OF SHOULDER: CPT

## 2021-08-31 RX ADMIN — POLYETHYLENE GLYCOL 3350 17 G: 17 POWDER, FOR SOLUTION ORAL at 10:00

## 2021-08-31 RX ADMIN — DOCUSATE SODIUM 100 MG: 100 CAPSULE, LIQUID FILLED ORAL at 10:00

## 2021-08-31 RX ADMIN — HYDROMORPHONE HYDROCHLORIDE 0.5 MG: 1 INJECTION, SOLUTION INTRAMUSCULAR; INTRAVENOUS; SUBCUTANEOUS at 15:52

## 2021-08-31 RX ADMIN — SODIUM CHLORIDE, POTASSIUM CHLORIDE, SODIUM LACTATE AND CALCIUM CHLORIDE 125 ML/HR: 600; 310; 30; 20 INJECTION, SOLUTION INTRAVENOUS at 05:42

## 2021-08-31 RX ADMIN — DOCUSATE SODIUM 100 MG: 100 CAPSULE, LIQUID FILLED ORAL at 20:14

## 2021-08-31 RX ADMIN — ASPIRIN 325 MG: 325 TABLET, COATED ORAL at 10:00

## 2021-08-31 RX ADMIN — IRON SUCROSE 200 MG: 20 INJECTION, SOLUTION INTRAVENOUS at 19:55

## 2021-08-31 RX ADMIN — HYDROMORPHONE HYDROCHLORIDE 0.5 MG: 1 INJECTION, SOLUTION INTRAMUSCULAR; INTRAVENOUS; SUBCUTANEOUS at 11:03

## 2021-08-31 RX ADMIN — HYDROMORPHONE HYDROCHLORIDE 0.5 MG: 1 INJECTION, SOLUTION INTRAMUSCULAR; INTRAVENOUS; SUBCUTANEOUS at 04:14

## 2021-08-31 RX ADMIN — HYDROCODONE BITARTRATE AND ACETAMINOPHEN 1 TABLET: 5; 325 TABLET ORAL at 23:05

## 2021-08-31 RX ADMIN — SODIUM CHLORIDE, PRESERVATIVE FREE 3 ML: 5 INJECTION INTRAVENOUS at 20:17

## 2021-09-01 PROBLEM — I82.621 ACUTE DEEP VEIN THROMBOSIS (DVT) OF BRACHIAL VEIN OF RIGHT UPPER EXTREMITY (HCC): Status: ACTIVE | Noted: 2021-09-01

## 2021-09-01 LAB
ANION GAP SERPL CALCULATED.3IONS-SCNC: 8.1 MMOL/L (ref 5–15)
BASOPHILS # BLD AUTO: 0.02 10*3/MM3 (ref 0–0.2)
BASOPHILS NFR BLD AUTO: 0.3 % (ref 0–1.5)
BUN SERPL-MCNC: 24 MG/DL (ref 8–23)
BUN/CREAT SERPL: 66.7 (ref 7–25)
CALCIUM SPEC-SCNC: 8.3 MG/DL (ref 8.2–9.6)
CHLORIDE SERPL-SCNC: 105 MMOL/L (ref 98–107)
CK SERPL-CCNC: 159 U/L (ref 20–180)
CO2 SERPL-SCNC: 23.9 MMOL/L (ref 22–29)
CREAT SERPL-MCNC: 0.36 MG/DL (ref 0.57–1)
DEPRECATED RDW RBC AUTO: 44.4 FL (ref 37–54)
EOSINOPHIL # BLD AUTO: 0.27 10*3/MM3 (ref 0–0.4)
EOSINOPHIL NFR BLD AUTO: 3.7 % (ref 0.3–6.2)
ERYTHROCYTE [DISTWIDTH] IN BLOOD BY AUTOMATED COUNT: 13.7 % (ref 12.3–15.4)
GFR SERPL CREATININE-BSD FRML MDRD: >150 ML/MIN/1.73
GLUCOSE SERPL-MCNC: 95 MG/DL (ref 65–99)
HCT VFR BLD AUTO: 32.2 % (ref 34–46.6)
HGB BLD-MCNC: 10.6 G/DL (ref 12–15.9)
LYMPHOCYTES # BLD AUTO: 1.43 10*3/MM3 (ref 0.7–3.1)
LYMPHOCYTES NFR BLD AUTO: 19.5 % (ref 19.6–45.3)
MCH RBC QN AUTO: 29.1 PG (ref 26.6–33)
MCHC RBC AUTO-ENTMCNC: 32.9 G/DL (ref 31.5–35.7)
MCV RBC AUTO: 88.5 FL (ref 79–97)
MONOCYTES # BLD AUTO: 0.6 10*3/MM3 (ref 0.1–0.9)
MONOCYTES NFR BLD AUTO: 8.2 % (ref 5–12)
NEUTROPHILS NFR BLD AUTO: 4.96 10*3/MM3 (ref 1.7–7)
NEUTROPHILS NFR BLD AUTO: 67.3 % (ref 42.7–76)
PLATELET # BLD AUTO: 108 10*3/MM3 (ref 140–450)
PMV BLD AUTO: 10.5 FL (ref 6–12)
POTASSIUM SERPL-SCNC: 3.9 MMOL/L (ref 3.5–5.2)
RBC # BLD AUTO: 3.64 10*6/MM3 (ref 3.77–5.28)
SODIUM SERPL-SCNC: 137 MMOL/L (ref 136–145)
WBC # BLD AUTO: 7.35 10*3/MM3 (ref 3.4–10.8)

## 2021-09-01 PROCEDURE — 99024 POSTOP FOLLOW-UP VISIT: CPT | Performed by: ORTHOPAEDIC SURGERY

## 2021-09-01 PROCEDURE — 80048 BASIC METABOLIC PNL TOTAL CA: CPT | Performed by: INTERNAL MEDICINE

## 2021-09-01 PROCEDURE — 82550 ASSAY OF CK (CPK): CPT | Performed by: INTERNAL MEDICINE

## 2021-09-01 PROCEDURE — 85025 COMPLETE CBC W/AUTO DIFF WBC: CPT | Performed by: INTERNAL MEDICINE

## 2021-09-01 PROCEDURE — 25010000002 ENOXAPARIN PER 10 MG: Performed by: NURSE PRACTITIONER

## 2021-09-01 PROCEDURE — 99232 SBSQ HOSP IP/OBS MODERATE 35: CPT | Performed by: NURSE PRACTITIONER

## 2021-09-01 PROCEDURE — 97110 THERAPEUTIC EXERCISES: CPT

## 2021-09-01 PROCEDURE — 92526 ORAL FUNCTION THERAPY: CPT | Performed by: SPEECH-LANGUAGE PATHOLOGIST

## 2021-09-01 RX ORDER — LORAZEPAM 2 MG/ML
0.5 INJECTION INTRAMUSCULAR ONCE
Status: COMPLETED | OUTPATIENT
Start: 2021-09-01 | End: 2021-09-02

## 2021-09-01 RX ORDER — FAMOTIDINE 20 MG/1
20 TABLET, FILM COATED ORAL
Status: DISCONTINUED | OUTPATIENT
Start: 2021-09-01 | End: 2021-09-01

## 2021-09-01 RX ORDER — FAMOTIDINE 20 MG/1
20 TABLET, FILM COATED ORAL DAILY
Status: DISCONTINUED | OUTPATIENT
Start: 2021-09-01 | End: 2021-09-04 | Stop reason: HOSPADM

## 2021-09-01 RX ADMIN — ENOXAPARIN SODIUM 50 MG: 60 INJECTION, SOLUTION INTRAVENOUS; SUBCUTANEOUS at 23:25

## 2021-09-01 RX ADMIN — HYDROCODONE BITARTRATE AND ACETAMINOPHEN 1 TABLET: 5; 325 TABLET ORAL at 10:49

## 2021-09-01 RX ADMIN — POLYETHYLENE GLYCOL 3350 17 G: 17 POWDER, FOR SOLUTION ORAL at 10:49

## 2021-09-01 RX ADMIN — DOCUSATE SODIUM 100 MG: 100 CAPSULE, LIQUID FILLED ORAL at 10:48

## 2021-09-01 RX ADMIN — SODIUM CHLORIDE, PRESERVATIVE FREE 3 ML: 5 INJECTION INTRAVENOUS at 20:14

## 2021-09-01 RX ADMIN — HYDROCODONE BITARTRATE AND ACETAMINOPHEN 1 TABLET: 5; 325 TABLET ORAL at 06:10

## 2021-09-01 RX ADMIN — FAMOTIDINE 20 MG: 20 TABLET, FILM COATED ORAL at 16:31

## 2021-09-01 RX ADMIN — ASPIRIN 325 MG: 325 TABLET, COATED ORAL at 10:49

## 2021-09-01 RX ADMIN — DOCUSATE SODIUM 100 MG: 100 CAPSULE, LIQUID FILLED ORAL at 20:14

## 2021-09-01 RX ADMIN — HYDROCODONE BITARTRATE AND ACETAMINOPHEN 1 TABLET: 5; 325 TABLET ORAL at 17:20

## 2021-09-01 RX ADMIN — ACETAMINOPHEN 650 MG: 325 TABLET, FILM COATED ORAL at 22:55

## 2021-09-01 RX ADMIN — ENOXAPARIN SODIUM 50 MG: 60 INJECTION, SOLUTION INTRAVENOUS; SUBCUTANEOUS at 00:15

## 2021-09-01 NOTE — CONSULTS
[facility] CONSULT      Patient Identification:  NAME:  Marcella Stephens  Age:  89 y.o.   Sex:  female   :  1923   MRN:  [id number]       Chief complaint: Retention.      History of present illness:  Admitted with R hip fx s/p fixation. Developed post op urinary retention. Denies voiding problems prior to admission. Cath in place. Urine clear. R HN on LANA.          Past medical history:  Past Medical History:   Diagnosis Date   • Prediabetes        Past surgical history:  Past Surgical History:   Procedure Laterality Date   • BACK SURGERY     • FEMUR IM NAILING/RODDING Right 2021    Procedure: FEMUR INTRAMEDULLARY NAILING/RODDING;  Surgeon: Louis Scruggs MD;  Location: [facility];  Service: Orthopedics;  Laterality: Right;   • KNEE SURGERY         Allergies:  Donepezil    Home medications:  Medications Prior to Admission   Medication Sig Dispense Refill Last Dose   • galantamine (RAZADYNE) 8 MG tablet Take 8 mg by mouth Daily.      • Multiple Vitamins-Minerals (MULTIVITAMIN ADULT PO) Take  by mouth Daily.           Hospital medications:  docusate sodium, 100 mg, Oral, BID  enoxaparin, 1 mg/kg, Subcutaneous, Q24H  famotidine, 20 mg, Oral, BID AC  gadobenate dimeglumine, 10 mL, Intravenous, Once in imaging  LORazepam, 0.5 mg, Intravenous, Once  polyethylene glycol, 17 g, Oral, Daily  sodium chloride, 3 mL, Intravenous, Q12H  vitamin D, 50,000 Units, Oral, Q7 Days      Pharmacy to Dose enoxaparin (LOVENOX),       •  acetaminophen  •  acetaminophen  •  HYDROcodone-acetaminophen  •  HYDROmorphone **AND** naloxone  •  melatonin  •  ondansetron **OR** ondansetron  •  Pharmacy to Dose enoxaparin (LOVENOX)  •  [COMPLETED] Insert peripheral IV **AND** sodium chloride    Family history:  History reviewed. No pertinent family history.    Social history:  Social History     Tobacco Use   • Smoking status: Never Smoker   • Smokeless tobacco: Never Used   Vaping Use   • Vaping Use: Never used    Substance Use Topics   • Alcohol use: No   • Drug use: No       Review of systems:      Positive for:  Retention.    Negative for:  Fever.      Objective:  TMax 24 hours:   Temp (24hrs), Av.3 °F (36.3 °C), Min:96.9 °F (36.1 °C), Max:97.7 °F (36.5 °C)      Vitals Ranges:   Temp:  [96.9 °F (36.1 °C)-97.7 °F (36.5 °C)] 96.9 °F (36.1 °C)  Heart Rate:  [78-88] 78  Resp:  [16] 16  BP: (114-136)/(68-82) 114/68    Intake/Output Last 3 shifts:  I/O last 3 completed shifts:  In: 1677 [P.O.:230; I.V.:1097; Blood:350]  Out: 4100 [Urine:4100]     Physical Exam:    General Appearance:    Alert, cooperative, NAD   HEENT:    No trauma, pupils reactive, hearing intact   Back:     No CVA tenderness   Lungs:     Respirations unlabored, no wheezing    Heart:    RRR.   Abdomen:     Soft, NDNT, no masses, no guarding   :    Pelvic not performed, bladder nondistended and nontender   Extremities:   No edema, no deformity   Lymphatic:      Skin:   No bleeding, bruising or rashes   Neuro/Psych:   Orientation intact, mood/affect pleasant, no focal findings       Results review:   I reviewed the patient's new clinical results.    Data review:  Lab Results (last 24 hours)     Procedure Component Value Units Date/Time    Basic Metabolic Panel [[id number]]  (Abnormal) Collected: 21    Specimen: Blood Updated: 21     Glucose 95 mg/dL      BUN 24 mg/dL      Creatinine 0.36 mg/dL      Sodium 137 mmol/L      Potassium 3.9 mmol/L      Chloride 105 mmol/L      CO2 23.9 mmol/L      Calcium 8.3 mg/dL      eGFR Non African Amer >150 mL/min/1.73      BUN/Creatinine Ratio 66.7     Anion Gap 8.1 mmol/L     Narrative:      GFR Normal >60  Chronic Kidney Disease <60  Kidney Failure <15      CK [[id number]]  (Normal) Collected: 21    Specimen: Blood Updated: 21     Creatine Kinase 159 U/L     CBC & Differential [[id number]]  (Abnormal) Collected: 21 0341    Specimen: Blood Updated: 21 0413     Narrative:      The following orders were created for panel order CBC & Differential.  Procedure                               Abnormality         Status                     ---------                               -----------         ------                     CBC Auto Differential[[id number]]        Abnormal            Final result                 Please view results for these tests on the individual orders.    CBC Auto Differential [[id number]]  (Abnormal) Collected: 09/01/21 0341    Specimen: Blood Updated: 09/01/21 0414     WBC 7.35 10*3/mm3      RBC 3.64 10*6/mm3      Hemoglobin 10.6 g/dL      Hematocrit 32.2 %      MCV 88.5 fL      MCH 29.1 pg      MCHC 32.9 g/dL      RDW 13.7 %      RDW-SD 44.4 fl      MPV 10.5 fL      Platelets 108 10*3/mm3      Neutrophil % 67.3 %      Lymphocyte % 19.5 %      Monocyte % 8.2 %      Eosinophil % 3.7 %      Basophil % 0.3 %      Neutrophils, Absolute 4.96 10*3/mm3      Lymphocytes, Absolute 1.43 10*3/mm3      Monocytes, Absolute 0.60 10*3/mm3      Eosinophils, Absolute 0.27 10*3/mm3      Basophils, Absolute 0.02 10*3/mm3            Imaging:  Imaging Results (Last 24 Hours)     Procedure Component Value Units Date/Time    MRI Cervical Spine With & Without Contrast [[id number]] Updated: 08/31/21 2155             Assessment:       Fall as cause of accidental injury at home as place of occurrence    Closed fracture of right hip (CMS/HCC)    Traumatic rhabdomyolysis (CMS/HCC)    Acute hyperglycemia    Alzheimer's dementia (CMS/HCC)    Acute kidney failure (CMS/HCC)    Anemia    Vitamin D deficiency    Weakness of right upper extremity    Acute retention of urine    Hydronephrosis    Acute deep vein thrombosis (DVT) of brachial vein of right upper extremity (CMS/HCC)    Urinary retention.  Mild R HN on LANA.      Plan:     Cont cantu catheter until ambulation improves.  At that point ISC Q8 hours fr PVR >400cc.      Lucas Baker MD  09/01/21  14:04 EDT

## 2021-09-01 NOTE — PLAN OF CARE
Goal Outcome Evaluation:  Plan of Care Reviewed With: patient           Outcome Summary: Pt. requires Max. assist x 2 for bed mobility and Mod. assist x 2 for sit <-> stand transfers. Pt. performed sit <-> stand transfers x 3 reps for functional strength training (HHA LUE and use of gait belt). Pt. unable to use a Wx due to RUE weakness/pain.  Right Hip ORIF ther. ex. program x 10 reps completed with assist for general strengthening. Verbal/tactile cues given throughout for posture correction.    Patient was wearing a face mask during this therapy encounter. Therapist used appropriate personal protective equipment including eye protection, mask, and gloves.  Mask used was standard procedure mask. Appropriate PPE was worn during the entire therapy session. Hand hygiene was completed before and after therapy session. Patient is not in enhanced droplet precautions.

## 2021-09-01 NOTE — THERAPY RE-EVALUATION
Acute Care - Speech Language Pathology   Swallow Re-Evaluation [facility]     Patient Name: Marcella Stephens  : 1923  MRN: [id number]  Today's Date: 2021               Admit Date: 2021    Visit Dx:     ICD-10-CM ICD-9-CM   1. Closed fracture of right hip, initial encounter (CMS/HCC)  S72.001A 820.8   2. Traumatic rhabdomyolysis, initial encounter (CMS/HCC)  T79.6XXA 958.6     Patient Active Problem List   Diagnosis   • Closed fracture of right hip (CMS/[facility])   • Traumatic rhabdomyolysis (CMS/[facility])   • Fall as cause of accidental injury at home as place of occurrence   • Acute hyperglycemia   • Alzheimer's dementia (CMS/[facility])   • Acute kidney failure (CMS/[facility])   • Anemia   • Vitamin D deficiency   • Weakness of right upper extremity   • Acute retention of urine   • Hydronephrosis     Past Medical History:   Diagnosis Date   • Prediabetes      Past Surgical History:   Procedure Laterality Date   • BACK SURGERY     • FEMUR IM NAILING/RODDING Right 2021    Procedure: FEMUR INTRAMEDULLARY NAILING/RODDING;  Surgeon: Louis Scruggs MD;  Location: [facility];  Service: Orthopedics;  Laterality: Right;   • KNEE SURGERY         SLP Recommendation and Plan  SLP Swallowing Diagnosis: swallow WFL  SLP Diet Recommendation: soft textures, chopped, thin liquids  Recommended Precautions and Strategies: upright posture during/after eating, small bites of food and sips of liquid  SLP Rec. for Method of Medication Administration: meds crushed, with pudding or applesauce     Monitor for Signs of Aspiration: yes, notify SLP if any concerns  Recommended Diagnostics: other (see comments) (VFSS or esphagram if patient has any difficulty with diet)  Swallow Criteria for Skilled Therapeutic Interventions Met: demonstrates skilled criteria  Anticipated Discharge Disposition (SLP): unknown  Rehab Potential/Prognosis, Swallowing: good, to achieve stated therapy goals  Therapy Frequency (Swallow): PRN  Predicted  Duration Therapy Intervention (Days): until discharge     Plan for Continued Treatment (SLP): SLP to follow for diet tolerance, instrumental evaluation as needed       Progress: improving  Outcome Summary: Swallowing re-evaluation completed. Patient's swallow was much improved with no s/s pharyngeal or esophageal dysphagia noted today.  Recommend mechanical soft diet, chopped with thins. Give meds crushed with puree or pudding.  Patient needs to sit upright to eat and drink and should take small bites and sips.  SLP to follow patient for diet tolerance. If patient exhibits any s/s aspiration with diet upgrade then SLP will proceed with VFSS and/or recommend esophagram as needed.     Patient was not wearing a face mask during this therapy encounter. Therapist used appropriate personal protective equipment including mask, eye protection and gloves.  Mask used was standard procedure mask. Appropriate PPE was worn during the entire therapy session. Hand hygiene was completed before and after therapy session. Patient is not in enhanced droplet precautions.          SWALLOW EVALUATION (last 72 hours)      SLP Adult Swallow Evaluation     Row Name 09/01/21 0900 08/31/21 0930                Rehab Evaluation    Document Type  re-evaluation  -J  evaluation  -       Subjective Information  no complaints  -J  no complaints  -ML       Patient Observations  alert;cooperative;agree to therapy  -  alert;cooperative  -       Patient/Family/Caregiver Comments/Observations  Son present for evaluation  -  Pt's  present at bedside.   -       Care Plan Review  evaluation/treatment results reviewed;patient/other agree to care plan  -  evaluation/treatment results reviewed  -       Care Plan Review, Other Participant(s)  son  -J  spouse  -ML       Patient Effort  good  -JJ  good  -ML       Symptoms Noted During/After Treatment  none  -JJ  none  -ML          General Information    Patient Profile Reviewed  yes  -JJ   yes  -ML       Pertinent History Of Current Problem  s/p hip fracture, rhabdomyolysis. yesterday the patient had a lot of difficulty clearing solids. She kept regurgitating the solids back up into her mouth but she was able to clear thins adequately and was switched to a clear liquid diet.  -JJ  Pt admitted s/p fall with hip fracture s/p open reduction and internal fixation. Consult placed for swallowing evaluation due to pt having difficulty swallowing water and taking pills per order  -ML       Current Method of Nutrition  clear liquids  -JJ  regular textures;thin liquids  -ML       Precautions/Limitations, Vision  WFL;for purposes of eval  -JJ  WFL;for purposes of eval  -ML       Precautions/Limitations, Hearing  WFL;for purposes of eval  -JJ  WFL;for purposes of eval  -ML       Prior Level of Function-Communication  unknown  -JJ  unknown  -ML       Prior Level of Function-Swallowing  no diet consistency restrictions  -JJ  no diet consistency restrictions  -ML       Plans/Goals Discussed with  patient and family;agreed upon  -JJ  patient;agreed upon  -ML       Barriers to Rehab  none identified  -JJ  --       Patient's Goals for Discharge  patient did not state  -JJ  patient did not state  -ML       Family Goals for Discharge  family did not state  -JJ  --          Pain    Additional Documentation  --  -- pt did not state any pain.   -ML          Oral Motor Structure and Function    Dentition Assessment  natural, present and adequate  -JJ  --       Secretion Management  WNL/WFL  -JJ  WNL/WFL  -ML       Mucosal Quality  moist, healthy  -JJ  moist, healthy  -ML       Volitional Swallow  WFL  -JJ  WFL  -ML       Volitional Cough  weak  -JJ  weak  -ML          Oral Musculature and Cranial Nerve Assessment    Oral Motor General Assessment  generalized oral motor weakness  -JJ  generalized oral motor weakness moderate  -ML          General Eating/Swallowing Observations    Respiratory Support Currently in Use  nasal  cannula  -JJ  nasal cannula  -ML       Eating/Swallowing Skills  self-fed;fed by SLP  -JJ  fed by staff/caregiver;fed by SLP  -ML       Positioning During Eating  upright 90 degree;upright in bed  -JJ  upright in bed  -ML       Utensils Used  spoon;cup;straw  -JJ  cup  -ML       Consistencies Trialed  regular textures;soft textures;whole;pureed;ice chips;thin liquids  -JJ  mechanical soft, no mixed consistencies;thin liquids  -ML          Respiratory    Respiratory Status  WFL  -JJ  --          Clinical Swallow Eval    Oral Prep Phase  WFL  -JJ  --       Oral Transit  WFL  -JJ  --       Oral Residue  WFL  -JJ  --       Pharyngeal Phase  no overt signs/symptoms of pharyngeal impairment  -JJ  --       Esophageal Phase  unremarkable  -JJ  --       Clinical Swallow Evaluation Summary  Swallow re-evaluation completed on this date. The patient was observed with ice chips, thins via spoon/cup/straw, puree, mechanical soft, and regular. No s/s aspiration were noted and patient was able to adequately clear all trials.  Patient appears to be much improved frm yesterday.  No regurgitation noted with any consistency tested. Swallow appears to be age appropriate at this time.  -  Clinical swallowing evaluation completed. Upon initially entering room, pt taking bite of scrambled eggs with pt's  feeding her. Pt noted to then start coughing. Pt stating she was having trouble eating and choking. In observation of pt, noted pt with mutliple swalloww, belching, gurgling and throat clearing. Pt noted to burp and have retrograde flow all the way into her mouth and pt attempted to keep swallowing back down. Pt cued to expectorate material after 3-4 regurgitations of bolos into oral cavity and pt expectorated large amount of material (eggs, liquids). Pt denied hx of reflux, heartburn, nausea, or esophgeal issues and reports this swallowing difficulty is all new. After a few minutes pt drank sips of thin liquid water x3. Pt  "exhibited no immediate throat clear or cough but continuous small belching and then mulitple swallows and slight throat clear. Pt also appeared to regurgitation again, but pt sated \"I don't think so, I don't know\". Pt declined all further p.o. trials. Suspect esophageal deficits at this time, however, oropharyngeal swallowing evaluation was limited this date. Pt could benefit from liquid diet (thin) at this time. Suspect pt will need esophagram or VFSS to further assess, but will hold until pt able to keep down more p.o.. Recommend meds crushed in puree, fully upright posture, reflux precautions, and small sips by cup  -ML          Clinical Impression    SLP Swallowing Diagnosis  swallow WFL  -JJ  esophageal dysphagia possible pharyngeal, further assessment warranted  -ML       Functional Impact  risk of aspiration/pneumonia  -JJ  risk of aspiration/pneumonia  -ML       Rehab Potential/Prognosis, Swallowing  good, to achieve stated therapy goals  -JJ  adequate, monitor progress closely  -ML       Swallow Criteria for Skilled Therapeutic Interventions Met  demonstrates skilled criteria  -JJ  demonstrates skilled criteria  -ML       Plan for Continued Treatment (SLP)  SLP to follow for diet tolerance, instrumental evaluation as needed  -JJ  --          Recommendations    Therapy Frequency (Swallow)  PRN  -JJ  PRN  -ML       Predicted Duration Therapy Intervention (Days)  until discharge  -JJ  until discharge  -ML       SLP Diet Recommendation  soft textures;chopped;thin liquids  -JJ  clear liquid diet;thin liquids  -ML       Recommended Diagnostics  other (see comments) VFSS or esphagram if patient has any difficulty with diet  -JJ  reassess via clinical swallow evaluation  -ML       Recommended Precautions and Strategies  upright posture during/after eating;small bites of food and sips of liquid  -JJ  upright posture during/after eating;no straw;reflux precautions small sips by cup  -ML       Oral Care " Recommendations  Oral Care BID/PRN;Oral Care before breakfast, after meals and PRN  -  Oral Care BID/PRN  -ML       SLP Rec. for Method of Medication Administration  meds crushed;with pudding or applesauce  -J  meds crushed;with pudding or applesauce  -ML       Monitor for Signs of Aspiration  yes;notify SLP if any concerns  -  notify SLP if any concerns;yes  -ML       Anticipated Discharge Disposition (SLP)  unknown  -  unknown  -ML          Swallow Goals (SLP)    Oral Nutrition/Hydration Goal Selection (SLP)  --  oral nutrition/hydration, SLP goal 1  -ML          Oral Nutrition/Hydration Goal 1 (SLP)    Oral Nutrition/Hydration Goal 1, SLP  --  Pt will safely swallow least restrictive diet without overt s/s of penetration/aspiration.   -ML       Time Frame (Oral Nutrition/Hydration Goal 1, SLP)  --  by discharge  -ML         User Key  (r) = Recorded By, (t) = Taken By, (c) = Cosigned By    Initials Name Effective Dates    Laura Salmeron, MS CCC-SLP 06/16/21 -     Alicia Gallegos MS CCC-SLP 06/16/21 -           EDUCATION  The patient has been educated in the following areas:   Dysphagia (Swallowing Impairment) Modified Diet Instruction.       SLP GOALS     Row Name 08/31/21 0930             Oral Nutrition/Hydration Goal 1 (SLP)    Oral Nutrition/Hydration Goal 1, SLP  Pt will safely swallow least restrictive diet without overt s/s of penetration/aspiration.   -ML      Time Frame (Oral Nutrition/Hydration Goal 1, SLP)  by discharge  -ML        User Key  (r) = Recorded By, (t) = Taken By, (c) = Cosigned By    Initials Name Provider Type     Alicia Wu MS CCC-SLP Speech and Language Pathologist           SLP Outcome Measures (last 72 hours)      SLP Outcome Measures     Row Name 09/01/21 1000 08/31/21 1200          SLP Outcome Measures    Outcome Measure Used?  Adult NOMS  -JJ  Adult NOMS  -ML        Adult FCM Scores    FCM Chosen  Swallowing  -JJ  Swallowing  -ML      Swallowing FCM Score  6  -ALY  3  -ML       User Key  (r) = Recorded By, (t) = Taken By, (c) = Cosigned By    Initials Name Effective Dates    Laura Salmeron MS CCC-SLP 06/16/21 -     Alicia Gallegos MS CCC-SLP 06/16/21 -            Time Calculation:   Time Calculation- SLP     Row Name 09/01/21 1007             Time Calculation- SLP    SLP Received On  09/01/21  -ALY        User Key  (r) = Recorded By, (t) = Taken By, (c) = Cosigned By    Initials Name Provider Type    Laura Salmeron, MS CCC-SLP Speech and Language Pathologist          Therapy Charges for Today     Code Description Service Date Service Provider Modifiers Qty    [id number] HC ST TREATMENT SWALLOW 3 9/1/2021 Laura Lambert, MS CCC-SLP GN 1               Laura Lambert MS CCC-SLP  9/1/2021

## 2021-09-01 NOTE — PLAN OF CARE
Goal Outcome Evaluation:  Plan of Care Reviewed With: patient        Progress: improving  Outcome Summary: POD#4 OF RIGHT FEMUR NAILING AND RODDING . DRESSING TO HIP IS INTACT. PO PAIN MEDICATION HELPING WITH PAIN. PATIENT IS ON CLEAR LIQUID DIET. VOIDING PER MENDES. ACUTE RIGHT UPPER ARM DVT. PATIENT IS CONFUCED. EDUCATION PROVIDED ON CONSTIPATION. WILL CONTINUE TO MONITOR.

## 2021-09-01 NOTE — NURSING NOTE
Spoke to Gareth in radiology and he will call before they send for pt that way I can give ativan and pain meds prior to

## 2021-09-01 NOTE — NURSING NOTE
Spoke to Kenyatta in MRI to see roughly when they will send for pt and she said there are several STAT MRIs ordered in front of her but they plan to send for her tonight

## 2021-09-01 NOTE — PLAN OF CARE
Goal Outcome Evaluation:              Outcome Summary: POD 4 R hip nailing/rodding, DVT RUE-elevated on pillows, disoriented to time, dressings intact, VSS, 2L, F/C until ambulation improves per urology note, max assist x2 w/bed mobility w/PT, ativan ordered prior to MRIs for clostrophobia; still awaiting MRI, CTM

## 2021-09-01 NOTE — PROGRESS NOTES
"DOS: 2021  NAME: Marcella Stephens   : 1923  PCP: Noemi Chester MD  Chief Complaint   Patient presents with   • Fall   • Arm Pain   • Leg Pain     Stroke    Subjective: Patient reported widespread generalized pain about the night; so much so that she was unable to complete MRI cervical spine and brain.  She denies any new complaints and or concerns on exam today.     at bedside    Objective:  Vital signs: /68 (BP Location: Left arm, Patient Position: Lying)   Pulse 78   Temp 96.9 °F (36.1 °C) (Skin)   Resp 16   Ht 157.5 cm (62\")   Wt 52.2 kg (115 lb)   SpO2 99%   BMI 21.03 kg/m²       General Appearance:           Well developed, well nourished, well groomed, alert, and cooperative.  HEENT:            Normocephalic.    Neck and Spine:         Normal range of motion.  Normal alignment. No mass.  Mild tenderness noted cervical spine.  No bruits.  Cardiac:                                 Regular rate and rhythm. No murmurs.  Peripheral Vasculature:       Radial and pedal pulses are equal and symmetric.   Extremities:                           No edema or deformities. Normal joint ROM on all extremities except right     lower extremity and right forearm/wrist.  Right hand edema noted.   Skin:                                       Right hip surgical site clean dry and intact/well approximated     Neurological examination:  Higher Integrative  Function:         Oriented to location, health, date of birth.  Unable to name month year/president.  Somewhat delayed registration, recall, attention span and concentration. Normal language including comprehension, spontaneous speech, repetition, reading, writing, naming and vocabulary. No obvious neglect with normal visual-spatial function and construction. Normal fund of knowledge and higher integrative function.  CN II:    Pupils are equal, round, and reactive to light. Normal visual acuity and visual fields.  Mild left eyelid ptosis " noted  CN III IV VI:      Extraocular movements are full without nystagmus.   CN V:   Normal facial sensation and strength of muscles of mastication.  CN VII:             Facial movements are symmetric. No weakness.  CN VIII:                                   Auditory acuity is normal.  CN IX & X:                              Symmetric palatal movement.  CN XI:  Sternocleidomastoid and trapezius are normal.  No weakness.  CN XII:                                    The tongue is midline.  No atrophy or fasciculations.  Motor:  Normal muscle strength, bulk and tone in left upper extremity and left lower extremity right upper 4+/5 right lower forearm/hand/wrist 4 -/5.  Right lower extremity 4-/5 status post surgery.  No fasciculations, rigidity, spasticity, or abnormal movements.  Reflexes:         Plantar responses are flexor.  Sensation:       Normal to light touch in arms and legs.   Station and Gait:        Deferred  Coordination:                        Normal finger-to-nose on left; mildly impaired on right  Exam unchanged from 8/31.    Laboratory results:  Lab Results   Component Value Date    GLUCOSE 95 09/01/2021    CALCIUM 8.3 09/01/2021     09/01/2021    K 3.9 09/01/2021    CO2 23.9 09/01/2021     09/01/2021    BUN 24 (H) 09/01/2021    CREATININE 0.36 (L) 09/01/2021    EGFRIFNONA >150 09/01/2021    BCR 66.7 (H) 09/01/2021    ANIONGAP 8.1 09/01/2021     Lab Results   Component Value Date    WBC 7.35 09/01/2021    HGB 10.6 (L) 09/01/2021    HCT 32.2 (L) 09/01/2021    MCV 88.5 09/01/2021     (L) 09/01/2021     No results found for: CHOL  Lab Results   Component Value Date    HDL 65 01/31/2019     Lab Results   Component Value Date     (H) 01/31/2019     Lab Results   Component Value Date    TRIG 110 01/31/2019         Lab 08/29/21  0922   HEMOGLOBIN A1C 5.53      Review and interpretation of imaging:  No new imaging reviewed    Diagnoses:   1.  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  Per family demineralized without evidence of acute fracture.  2.  Intertrochanteric fractures; right that is post surgical repair  3.  Eyelid ptosis; left  4.  High risk for falls  5.  Rhabdomyolysis; resolving     Plan:  · MRI brain and cervical spine to further evaluate etiology of right upper extremity weakness although likely chronic  · Continue aspirin 325 mg daily    Case reviewed w/ attending neurologist Dr. Martínez and he agrees with plan above     · NILS Juan

## 2021-09-01 NOTE — PLAN OF CARE
Goal Outcome Evaluation:           Progress: improving  Outcome Summary: Swallowing re-evaluation completed. Patient's swallow was much improved with no s/s pharyngeal or esophageal dysphagia noted today.  Recommend mechanical soft diet, chopped with thins. Give meds crushed with puree or pudding.  Patient needs to sit upright to eat and drink and should take small bites and sips.  SLP to follow patient for diet tolerance. If patient exhibits any s/s aspiration with diet upgrade then SLP will proceed with VFSS and/or recommend esophagram as needed.

## 2021-09-01 NOTE — THERAPY TREATMENT NOTE
Patient Name: Marcella Stephens  : 1923    MRN: [id number]                              Today's Date: 2021       Admit Date: 2021    Visit Dx:     ICD-10-CM ICD-9-CM   1. Closed fracture of right hip, initial encounter (CMS/[facility])  S72.001A 820.8   2. Traumatic rhabdomyolysis, initial encounter (CMS/[facility])  T79.6XXA 958.6     Patient Active Problem List   Diagnosis   • Closed fracture of right hip (CMS/[facility])   • Traumatic rhabdomyolysis (CMS/[facility])   • Fall as cause of accidental injury at home as place of occurrence   • Acute hyperglycemia   • Alzheimer's dementia (CMS/[facility])   • Acute kidney failure (CMS/[facility])   • Anemia   • Vitamin D deficiency   • Weakness of right upper extremity   • Acute retention of urine   • Hydronephrosis   • Acute deep vein thrombosis (DVT) of brachial vein of right upper extremity (CMS/[facility])     Past Medical History:   Diagnosis Date   • Prediabetes      Past Surgical History:   Procedure Laterality Date   • BACK SURGERY     • FEMUR IM NAILING/RODDING Right 2021    Procedure: FEMUR INTRAMEDULLARY NAILING/RODDING;  Surgeon: Louis Scruggs MD;  Location: [facility];  Service: Orthopedics;  Laterality: Right;   • KNEE SURGERY       General Information     Row Name 21 1523          Physical Therapy Time and Intention    Document Type  therapy note (daily note)  -MS     Mode of Treatment  physical therapy;individual therapy  -MS     Row Name 21 1523          General Information    Patient Profile Reviewed  yes  -MS     Existing Precautions/Restrictions  (S) fall Exit alarm  -MS     Barriers to Rehab  none identified  -MS     Row Name 21 1523          Safety Issues, Functional Mobility    Comment, Safety Issues/Impairments (Mobility)  Gait belt used for safety.  -MS       User Key  (r) = Recorded By, (t) = Taken By, (c) = Cosigned By    Initials Name Provider Type    Tobi Feng PT Physical Therapist        Mobility     Row Name 21 1524           Bed Mobility    Supine-Sit [place] (Bed Mobility)  maximum assist (25% patient effort);2 person assist  -MS     Sit-Supine [place] (Bed Mobility)  maximum assist (25% patient effort);2 person assist  -MS     Assistive Device (Bed Mobility)  draw sheet  -MS     Row Name 09/01/21 1524          Transfers    Comment (Transfers)  Pt. performed sit <-> stand transfers x 3 reps for functional strength training. Pt. unable to use a Rwx due to Right U.E. weakness/pain. LUE HHA and use of gait belt only.  Bilateral feet blocked for safety as well.  -MS     Row Name 09/01/21 1524          Sit-Stand Transfer    Sit-Stand [place] (Transfers)  moderate assist (50% patient effort);2 person assist  -MS     Assistive Device (Sit-Stand Transfers)  -- HHA LUE and use of gait belt  -MS     Row Name 09/01/21 1524          Mobility    Right Lower Extremity (Weight-bearing Status)  weight-bearing as tolerated (WBAT)  -MS       User Key  (r) = Recorded By, (t) = Taken By, (c) = Cosigned By    Initials Name Provider Type    Tobi Feng PT Physical Therapist        Obj/Interventions     Row Name 09/01/21 1525          Motor Skills    Therapeutic Exercise  -- Right Hip ORIF ther. ex. program x 10 reps completed with assist.  -MS       User Key  (r) = Recorded By, (t) = Taken By, (c) = Cosigned By    Initials Name Provider Type    Tobi Feng, PT Physical Therapist        Goals/Plan    No documentation.       Clinical Impression     Row Name 09/01/21 1526          Pain    Additional Documentation  Pain Scale: Numbers Pre/Post-Treatment (Group)  -MS     Row Name 09/01/21 1526          Pain Scale: Numbers Pre/Post-Treatment    Pretreatment Pain Rating  5/10  -MS     Posttreatment Pain Rating  3/10  -MS     Pain Location - Side  Right  -MS     Pain Location  hip  -MS     Pre/Posttreatment Pain Comment  Pt. also reports 4/10 pain in her Right U.E.  -MS     Pain Intervention(s)  Medication (See  MAR);Nursing Notified;Elevated;Cold applied;Repositioned  -MS     Row Name 09/01/21 1526          Positioning and Restraints    Pre-Treatment Position  in bed  -MS     Post Treatment Position  bed  -MS     In Bed  notified nsg;supine;call light within reach;encouraged to call for assist;exit alarm on;R heel elevated;L heel elevated;RUE elevated All lines intact.  -MS       User Key  (r) = Recorded By, (t) = Taken By, (c) = Cosigned By    Initials Name Provider Type    Tobi Feng, PT Physical Therapist        Outcome Measures     Row Name 09/01/21 1527          How much help from another person do you currently need...    Turning from your back to your side while in flat bed without using bedrails?  2  -MS     Moving from lying on back to sitting on the side of a flat bed without bedrails?  2  -MS     Moving to and from a bed to a chair (including a wheelchair)?  1  -MS     Standing up from a chair using your arms (e.g., wheelchair, bedside chair)?  2  -MS     Climbing 3-5 steps with a railing?  1  -MS     To walk in hospital room?  1  -MS     AM-PAC 6 Clicks Score (PT)  9  -MS       User Key  (r) = Recorded By, (t) = Taken By, (c) = Cosigned By    Initials Name Provider Type    MS Tobi Beauchamp, PT Physical Therapist                       Physical Therapy Education                 Title: PT OT SLP Therapies (In Progress)     Topic: Physical Therapy (In Progress)     Point: Mobility training (In Progress)     Learning Progress Summary           Patient Acceptance, E,D, NR by MS at 9/1/2021 1527    Acceptance, E,TB, NL,NR by CS at 8/31/2021 1441    Acceptance, E,D, VU,NR by MS at 8/30/2021 1250    Acceptance, E, NR by SHERIF at 8/29/2021 1106   Family Acceptance, E, NR by SHERIF at 8/29/2021 1106                   Point: Home exercise program (In Progress)     Learning Progress Summary           Patient Acceptance, E,D, NR by MS at 9/1/2021 1527    Acceptance, E,TB, NL,NR by CS at 8/31/2021 1441    Acceptance,  E,D, VU,NR by MS at 8/30/2021 1250    Acceptance, E, NR by DJ at 8/29/2021 1106   Family Acceptance, E, NR by DJ at 8/29/2021 1106                   Point: Body mechanics (In Progress)     Learning Progress Summary           Patient Acceptance, E,D, NR by MS at 9/1/2021 1527    Acceptance, E,TB, NL,NR by CS at 8/31/2021 1441    Acceptance, E,D, VU,NR by MS at 8/30/2021 1250    Acceptance, E, NR by DJ at 8/29/2021 1106   Family Acceptance, E, NR by DJ at 8/29/2021 1106                   Point: Precautions (In Progress)     Learning Progress Summary           Patient Acceptance, E,D, NR by MS at 9/1/2021 1527    Acceptance, E,TB, NL,NR by CS at 8/31/2021 1441    Acceptance, E,D, VU,NR by MS at 8/30/2021 1250    Acceptance, E, NR by DJ at 8/29/2021 1106   Family Acceptance, E, NR by DJ at 8/29/2021 1106                               User Key     Initials Effective Dates Name Provider Type Discipline    MS 06/16/21 -  Tobi Beauchamp, PT Physical Therapist PT     10/25/19 -  Ella Maria, PT Physical Therapist PT     02/02/21 -  Fe Fish, RN Registered Nurse Nurse              PT Recommendation and Plan     Plan of Care Reviewed With: patient  Outcome Summary: Pt. requires Max. assist x 2 for bed mobility and Mod. assist x 2 for sit <-> stand transfers. Pt. performed sit <-> stand transfers x 3 reps for functional strength training (HHA LUE and use of gait belt). Pt. unable to use a Wx due to RUE weakness/pain.  Right Hip ORIF ther. ex. program x 10 reps completed with assist for general strengthening. Verbal/tactile cues given throughout for posture correction.     Time Calculation:   PT Charges     Row Name 09/01/21 1529             Time Calculation    Start Time  1352  -MS      Stop Time  1407  -MS      Time Calculation (min)  15 min  -MS      PT Received On  09/01/21  -MS      PT - Next Appointment  09/02/21  -MS         Time Calculation- PT    Total Timed Code Minutes- PT  14 minute(s)  -MS         User Key  (r) = Recorded By, (t) = Taken By, (c) = Cosigned By    Initials Name Provider Type    MS Tobi Beauchamp, PT Physical Therapist        Therapy Charges for Today     Code Description Service Date Service Provider Modifiers Qty    63002632171 HC PT THER PROC EA 15 MIN 9/1/2021 Tobi Beauchamp, PT GP 1    37978146127 HC PT THER SUPP EA 15 MIN 9/1/2021 Tobi Beauchamp, PT GP 1          PT G-Codes  Outcome Measure Options: AM-PAC 6 Clicks Basic Mobility (PT)  AM-PAC 6 Clicks Score (PT): 9  AM-PAC 6 Clicks Score (OT): 10    Tobi Beauchamp, PT  9/1/2021

## 2021-09-01 NOTE — PROGRESS NOTES
Orthopedic Progress Note      Patient: Marcella Stephens    YOB: 1923    Medical Record Number: [id number]    Attending Physician: Grace Girard MD    Date of Admission: 8/27/2021  4:17 PM    Admitting Dx:  Closed fracture of right hip, initial encounter (CMS/HCC) [S72.001A]  Traumatic rhabdomyolysis, initial encounter (CMS/HCC) [T79.6XXA]    Status Post: Cephalomedullary fixation of right intertrochanteric femur fracture    Post Operative Day Number: 4    Current Problem List:   Patient Active Problem List   Diagnosis    Closed fracture of right hip (CMS/[facility])    Traumatic rhabdomyolysis (CMS/HCC)    Fall as cause of accidental injury at home as place of occurrence    Acute hyperglycemia    Alzheimer's dementia (CMS/[facility])    Acute kidney failure (CMS/HCC)    Anemia    Vitamin D deficiency    Weakness of right upper extremity    Acute retention of urine    Hydronephrosis         Past Medical History:   Diagnosis Date    Prediabetes        Current Medications:  Scheduled Meds:aspirin, 325 mg, Oral, Daily  docusate sodium, 100 mg, Oral, BID  enoxaparin, 1 mg/kg, Subcutaneous, Q24H  gadobenate dimeglumine, 10 mL, Intravenous, Once in imaging  polyethylene glycol, 17 g, Oral, Daily  sodium chloride, 3 mL, Intravenous, Q12H  vitamin D, 50,000 Units, Oral, Q7 Days      PRN Meds:.  acetaminophen    acetaminophen    HYDROcodone-acetaminophen    HYDROmorphone **AND** naloxone    melatonin    ondansetron **OR** ondansetron    [COMPLETED] Insert peripheral IV **AND** sodium chloride    SUBJECTIVE: 89 y.o.  female.  More awake today.  No complaints    OBJECTIVE:   Vitals:    08/31/21 1453 08/31/21 1900 08/31/21 2340 09/01/21 0700   BP: 115/68 130/73 136/82 114/68   BP Location: Right arm Right arm Left leg Left arm   Patient Position: Lying Lying Lying Lying   Pulse: 81 88 84 78   Resp: 16  16 16   Temp: 97.7 °F (36.5 °C) 97.5 °F (36.4 °C) 97.1 °F (36.2 °C) 96.9 °F (36.1 °C)   TempSrc: Oral Skin Skin  Skin   SpO2: 97% 91% 97% 99%   Weight:       Height:         I/O last 3 completed shifts:  In: 1677 [P.O.:230; I.V.:1097; Blood:350]  Out: 4100 [Urine:4100]    Diagnostic Tests:   Lab Results (last 24 hours)       Procedure Component Value Units Date/Time    Basic Metabolic Panel [[id number]]  (Abnormal) Collected: 09/01/21 0341    Specimen: Blood Updated: 09/01/21 0441     Glucose 95 mg/dL      BUN 24 mg/dL      Creatinine 0.36 mg/dL      Sodium 137 mmol/L      Potassium 3.9 mmol/L      Chloride 105 mmol/L      CO2 23.9 mmol/L      Calcium 8.3 mg/dL      eGFR Non African Amer >150 mL/min/1.73      BUN/Creatinine Ratio 66.7     Anion Gap 8.1 mmol/L     Narrative:      GFR Normal >60  Chronic Kidney Disease <60  Kidney Failure <15      CK [[id number]]  (Normal) Collected: 09/01/21 0341    Specimen: Blood Updated: 09/01/21 0439     Creatine Kinase 159 U/L     CBC & Differential [[id number]]  (Abnormal) Collected: 09/01/21 0341    Specimen: Blood Updated: 09/01/21 0414    Narrative:      The following orders were created for panel order CBC & Differential.  Procedure                               Abnormality         Status                     ---------                               -----------         ------                     CBC Auto Differential[[id number]]        Abnormal            Final result                 Please view results for these tests on the individual orders.    CBC Auto Differential [[id number]]  (Abnormal) Collected: 09/01/21 0341    Specimen: Blood Updated: 09/01/21 0414     WBC 7.35 10*3/mm3      RBC 3.64 10*6/mm3      Hemoglobin 10.6 g/dL      Hematocrit 32.2 %      MCV 88.5 fL      MCH 29.1 pg      MCHC 32.9 g/dL      RDW 13.7 %      RDW-SD 44.4 fl      MPV 10.5 fL      Platelets 108 10*3/mm3      Neutrophil % 67.3 %      Lymphocyte % 19.5 %      Monocyte % 8.2 %      Eosinophil % 3.7 %      Basophil % 0.3 %      Neutrophils, Absolute 4.96 10*3/mm3      Lymphocytes, Absolute 1.43 10*3/mm3       Monocytes, Absolute 0.60 10*3/mm3      Eosinophils, Absolute 0.27 10*3/mm3      Basophils, Absolute 0.02 10*3/mm3             PHYSICAL EXAM: Right hip and thigh dressings remain dry and intact.  Moderate swelling however calf is soft and nontender.  Good motion sensation to foot and ankle.  Pain is well controlled.  Right arm remains edematous but less tender today.  Patient is able to wiggle her fingers better and shrug her shoulder.  Hand does appear warm to touch.  Positive for a right brachial DVT and is now on Lovenox a milligram per kilogram.  Patient has been seen by neurology and they have ordered an MRI of the brain as well as cervical MRI for further evaluation of the right arm weakness.  Patient is right-hand dominant and according to both the patient and her son she was independent at home and denies any right upper extremity weakness prior to her admission.    Slow progress with PT.  Have encouraged patient to work on active range of motion exercises to her right hands, fingers and elbow.  Also encouraged ankle pumps.  Hemoglobin is 10.6.    Patient still having postop urinary retention.  Dupree inserted per urology recommendation.    ASSESSMENT & PLAN:    Plan transfer to [facility] when medically stable.      Date: 9/1/2021    Heavenly Baxter RN      I have reviewed the above and agree.    Louis Scruggs MD

## 2021-09-01 NOTE — PROGRESS NOTES
"Pharmacy Consult - Lovenox    [facility] has been consulted for pharmacy to dose enoxaparin for dvt/pe per Dr. Girard's request.       Relevant clinical data and objective history reviewed:  89 y.o. female 157.5 cm (62\") 52.2 kg (115 lb) Body mass index is 21.03 kg/m².      Past Medical History:   Diagnosis Date   • Prediabetes      is allergic to donepezil.    Lab Results   Component Value Date    WBC 7.35 09/01/2021    HGB 10.6 (L) 09/01/2021    HCT 32.2 (L) 09/01/2021    MCV 88.5 09/01/2021     (L) 09/01/2021     Lab Results   Component Value Date    GLUCOSE 95 09/01/2021    CALCIUM 8.3 09/01/2021     09/01/2021    K 3.9 09/01/2021    CO2 23.9 09/01/2021     09/01/2021    BUN 24 (H) 09/01/2021    CREATININE 0.36 (L) 09/01/2021    EGFRIFNONA >150 09/01/2021    BCR 66.7 (H) 09/01/2021    ANIONGAP 8.1 09/01/2021       Serum creatinine: 0.36 mg/dL (L) 09/01/21 0341  Estimated creatinine clearance: 33.1 mL/min (A)    Active Inpatient Anticoagulation Orders: enoxaparin 1 mg/kg sq q24    Assessment/Plan    Patient's creatinine clearance is borderline for q12 vs q24 at 33 ml/min. Scr is decreased from 8/31 (0.94->0.36).     -fecal occult blood positive 8/31  -platelets improved from 8/31:  84->108  -hgb/hct 10.6/32.2-stable from 8/31    Will continue enoxaparin at 1 mg/kg sq q24 (was just started at 0015 today). If h/h and scr remain stable, will consider increasing to 1 mg/kg q12.  Pharmacy will continue to follow.     Corey Henning, Pharm.D, [facility]    "

## 2021-09-01 NOTE — PROGRESS NOTES
Name: Marcella Stephens ADMIT: 2021   : 1923  PCP: Noemi Chester MD    MRN: [id number] LOS: 5 days   AGE/SEX: 89 y.o. female  ROOM: [facility]     Subjective   Subjective   Patient is a poor historian.  Patient complaining of generalized weakness.  Patient reports no pain in the right hip.  No lower extremity numbness or tingling.  Clear urine in the Dupree cath.  No abdominal pain.        Review of Systems  Cardio vascular/respiratory.  No chest pain/no palpitations/no shortness of breath  GI.  No abdominal pain or nausea or vomiting.  She does not remember when was her last bowel movement.  CNS.  No headache.  No dizziness.  Denies focal neurological symptoms.     Objective   Objective   Vital Signs  Temp:  [96.9 °F (36.1 °C)-97.7 °F (36.5 °C)] 96.9 °F (36.1 °C)  Heart Rate:  [78-88] 78  Resp:  [16] 16  BP: (114-136)/(68-82) 114/68  SpO2:  [91 %-99 %] 99 %  on  Flow (L/min):  [2] 2;   Device (Oxygen Therapy): nasal cannula    Intake/Output Summary (Last 24 hours) at 2021 1315  Last data filed at 2021 0900  Gross per 24 hour   Intake 300 ml   Output 1175 ml   Net -875 ml     Body mass index is 21.03 kg/m².      21  1629   Weight: 52.2 kg (115 lb)     Physical Exam    General.  Elderly female.  She is alert.  Oriented x2.  No apparent pain distress or diaphoresis.  Normal mood and affect.  Friable.  Lethargic.  Eyes.  Pupils equal round and reactive.  Intact except for musculature.  No pallor or jaundice.  Normal conjunctive and lids.  Positive bilateral arcus analysis and status post bilateral cataract surgery.    Oral cavity.  Moist mucous membrane.  Neck.  No JVD.  Supple.  No lymphadenopathy or thyromegaly.  Cardiovascular.  Regular rate and rhythm.  Grade 2 systolic murmur.  Chest.  Clear to auscultation bilaterally with no added sounds.  Abdomen.  Soft lax.  No tenderness.  No organomegaly.  No guarding or rebound.  Extremities.  No clubbing cyanosis or edema.  Dressed right hip  wound.   Swelling in the right upper extremity with tenderness of the right hand..  CNS.  There is a right upper extremity weakness.      Results Review:      Results from last 7 days   Lab Units 09/01/21  0341 08/31/21  0503 08/30/21  0722 08/29/21  0922 08/28/21  0706 08/27/21  1636   SODIUM mmol/L 137 134* 131* 131* 139 139   POTASSIUM mmol/L 3.9 4.3 4.6 4.3 4.3 4.4   CHLORIDE mmol/L 105 104 99 98 105 101   CO2 mmol/L 23.9 20.7* 21.2* 21.8* 24.2 26.5   BUN mg/dL 24* 46* 63* 50* 44* 37*   CREATININE mg/dL 0.36* 0.94 1.83* 1.08* 0.69 0.73   GLUCOSE mg/dL 95 83 106* 196* 98 117*   CALCIUM mg/dL 8.3 8.3 8.4 8.3 8.8 9.2   AST (SGOT) U/L  --   --   --   --   --  38*   ALT (SGPT) U/L  --   --   --   --   --  20     Estimated Creatinine Clearance: 33.1 mL/min (A) (by C-G formula based on SCr of 0.36 mg/dL (L)).  Results from last 7 days   Lab Units 08/29/21  0922   HEMOGLOBIN A1C % 5.53         Results from last 7 days   Lab Units 09/01/21  0341 08/31/21  0503 08/29/21  0922 08/28/21  1628 08/28/21  0706 08/27/21  1636   CK TOTAL U/L 159 201* 383* 654* 851* 1,273*             Results from last 7 days   Lab Units 08/29/21  0922   MAGNESIUM mg/dL 2.3           Invalid input(s): LDLCALC  Results from last 7 days   Lab Units 09/01/21  0341 08/31/21  0503 08/31/21  0503 08/30/21  0722 08/29/21  0922 08/29/21  0922 08/28/21  0706 08/28/21  0706 08/27/21  1636 08/27/21  1636   WBC 10*3/mm3 7.35  --  7.61 8.53  --  8.60  --  7.46  --  9.05   HEMOGLOBIN g/dL 10.6*  --  10.6* 7.8*  --  8.6*  --  10.3*  --  10.5*   HEMATOCRIT % 32.2*  --  33.8* 24.4*  --  26.5*  --  31.1*  --  31.9*   PLATELETS 10*3/mm3 108*  --  84* 107*  --  155  --  148  --  159   MCV fL 88.5   < > 91.6 90.0   < > 90.1   < > 88.9   < > 87.9   MCH pg 29.1   < > 28.7 28.8   < > 29.3   < > 29.4   < > 28.9   MCHC g/dL 32.9   < > 31.4* 32.0   < > 32.5   < > 33.1   < > 32.9   RDW % 13.7   < > 14.3 14.3   < > 14.3   < > 14.3   < > 14.1   RDW-SD fl 44.4   < > 48.2 46.6    < > 46.9   < > 46.1   < > 44.3   MPV fL 10.5   < > 11.4 10.9   < > 11.2   < > 10.6   < > 10.8   NEUTROPHIL % % 67.3   < > 73.9 73.9   < > 79.2*  --   --    < > 80.2*   LYMPHOCYTE % % 19.5*   < > 15.9* 16.1*   < > 14.2*  --   --    < > 10.7*   MONOCYTES % % 8.2   < > 6.4 8.7   < > 5.9  --   --    < > 8.4   EOSINOPHIL % % 3.7   < > 2.8 0.5   < > 0.1*  --   --    < > 0.0*   BASOPHIL % % 0.3   < > 0.3 0.1   < > 0.1  --   --    < > 0.1   IMM GRAN % %  --   --  0.7*  --   --   --   --   --    < > 0.6*   NEUTROS ABS 10*3/mm3 4.96   < > 5.63 6.31   < > 6.81  --   --    < > 7.26*   LYMPHS ABS 10*3/mm3 1.43   < > 1.21 1.37   < > 1.22  --   --    < > 0.97   MONOS ABS 10*3/mm3 0.60   < > 0.49 0.74   < > 0.51  --   --    < > 0.76   EOS ABS 10*3/mm3 0.27   < > 0.21 0.04   < > 0.01  --   --    < > 0.00   BASOS ABS 10*3/mm3 0.02   < > 0.02 0.01   < > 0.01  --   --    < > 0.01   IMMATURE GRANS (ABS) 10*3/mm3  --   --  0.05  --   --   --   --   --    < > 0.05   NRBC /100 WBC  --   --  0.0  --   --   --   --   --    < > 0.0    < > = values in this interval not displayed.     Results from last 7 days   Lab Units 08/27/21  1759   INR  1.10                                       Imaging:  Imaging Results (Last 24 Hours)     Procedure Component Value Units Date/Time    MRI Cervical Spine With & Without Contrast [[id number]] Updated: 08/31/21 4772             I reviewed the patient's new clinical results / labs / tests / procedures      Assessment/Plan     Active Hospital Problems    Diagnosis  POA   • **Fall as cause of accidental injury at home as place of occurrence [W19.XXXA, Y92.009]  Not Applicable   • Acute deep vein thrombosis (DVT) of brachial vein of right upper extremity (CMS/HCC) [I82.621]  Yes   • Weakness of right upper extremity [R29.898]  Yes   • Acute retention of urine [R33.8]  Yes   • Hydronephrosis [N13.30]  Yes   • Acute kidney failure (CMS/HCC) [N17.9]  Yes   • Anemia [D64.9]  Yes   • Vitamin D deficiency [E55.9]  Yes    • Acute hyperglycemia [R73.9]  Yes   • Alzheimer's dementia (CMS/HCC) [G30.9, F02.80]  Yes   • Traumatic rhabdomyolysis (CMS/HCC) [T79.6XXA]  Yes   • Closed fracture of right hip (CMS/HCC) [S72.001A]  Yes      Resolved Hospital Problems   No resolved problems to display.           · Status post fall leading to right hip fracture s/p open reduction and internal fixation.  Currently on aspirin for DVT prophylaxis but will stop that since the patient is going to be anticoagulated..  Currently on PT.  Will need placement.  · Traumatic rhabdomyolysis.  Resolved.  Normal CK.  DC IV fluids.    · Acute kidney failure/acute urinary retention/right hydronephrosis.  Patient was volume depleted.  On no nephrotoxic agents.  Good urine output.  Mostly hypovolemia/rhabdomyolysis/hypotension/hydronephrosis induced.  Patient appears euvolemic now.  Kidney function back to normal.  Will DC IV fluid.  Renal ultrasound with distended bladder and mild right hydronephrosis.  Dupree catheter in place.  Awaiting urology consultation.    · Chronic disease anemia/thrombocytopenia..  Now a component of postoperative anemia.  Baseline hemoglobin is around 10.5.    Status post transfusion with appropriate hemoglobin response.  Hemoglobin stable.  Hemoccult positive stool but will defer GI work-up secondary to the patient's age.  Closely monitor CBC.  · Vitamin D deficiency.  Supplementation started by Ortho.  · Glucose intolerance.  A1c 5.3.  · Dementia. Right upper extremity weakness/swelling.  Neurology saw the patient.  Ordering MRI of the brain and C-spine.  To evaluate for right upper extremity weakness.  · Acute right upper extremity DVT.  Plan anticoagulation and closely monitor.  · Swallow disorder.  Speech saw the patient.  Diet advanced to soft mechanical and thin liquids.      Discussed with patient.  Disposition.  2 to 3 days to a skilled facility.  Patient already has a place at the skilled facility    Grace Girard  MD [facility]  09/01/21  13:15 EDT

## 2021-09-02 ENCOUNTER — APPOINTMENT (OUTPATIENT)
Dept: MRI IMAGING | Facility: HOSPITAL | Age: 86
End: 2021-09-02

## 2021-09-02 LAB
BASOPHILS # BLD AUTO: 0.03 10*3/MM3 (ref 0–0.2)
BASOPHILS NFR BLD AUTO: 0.5 % (ref 0–1.5)
CHOLEST SERPL-MCNC: 137 MG/DL (ref 0–200)
DEPRECATED RDW RBC AUTO: 44.3 FL (ref 37–54)
EOSINOPHIL # BLD AUTO: 0.17 10*3/MM3 (ref 0–0.4)
EOSINOPHIL NFR BLD AUTO: 2.6 % (ref 0.3–6.2)
ERYTHROCYTE [DISTWIDTH] IN BLOOD BY AUTOMATED COUNT: 13.9 % (ref 12.3–15.4)
HCT VFR BLD AUTO: 34.1 % (ref 34–46.6)
HDLC SERPL-MCNC: 50 MG/DL (ref 40–60)
HGB BLD-MCNC: 11.4 G/DL (ref 12–15.9)
IMM GRANULOCYTES # BLD AUTO: 0.07 10*3/MM3 (ref 0–0.05)
IMM GRANULOCYTES NFR BLD AUTO: 1.1 % (ref 0–0.5)
LDLC SERPL CALC-MCNC: 67 MG/DL (ref 0–100)
LDLC/HDLC SERPL: 1.3 {RATIO}
LYMPHOCYTES # BLD AUTO: 0.98 10*3/MM3 (ref 0.7–3.1)
LYMPHOCYTES NFR BLD AUTO: 14.9 % (ref 19.6–45.3)
MCH RBC QN AUTO: 29.6 PG (ref 26.6–33)
MCHC RBC AUTO-ENTMCNC: 33.4 G/DL (ref 31.5–35.7)
MCV RBC AUTO: 88.6 FL (ref 79–97)
MONOCYTES # BLD AUTO: 0.62 10*3/MM3 (ref 0.1–0.9)
MONOCYTES NFR BLD AUTO: 9.4 % (ref 5–12)
NEUTROPHILS NFR BLD AUTO: 4.71 10*3/MM3 (ref 1.7–7)
NEUTROPHILS NFR BLD AUTO: 71.5 % (ref 42.7–76)
NRBC BLD AUTO-RTO: 0 /100 WBC (ref 0–0.2)
PLATELET # BLD AUTO: 135 10*3/MM3 (ref 140–450)
PMV BLD AUTO: 9.9 FL (ref 6–12)
RBC # BLD AUTO: 3.85 10*6/MM3 (ref 3.77–5.28)
TRIGL SERPL-MCNC: 111 MG/DL (ref 0–150)
VLDLC SERPL-MCNC: 20 MG/DL (ref 5–40)
WBC # BLD AUTO: 6.58 10*3/MM3 (ref 3.4–10.8)

## 2021-09-02 PROCEDURE — 25010000002 ENOXAPARIN PER 10 MG: Performed by: NURSE PRACTITIONER

## 2021-09-02 PROCEDURE — 70551 MRI BRAIN STEM W/O DYE: CPT

## 2021-09-02 PROCEDURE — 25010000002 LORAZEPAM PER 2 MG: Performed by: NURSE PRACTITIONER

## 2021-09-02 PROCEDURE — 99024 POSTOP FOLLOW-UP VISIT: CPT | Performed by: ORTHOPAEDIC SURGERY

## 2021-09-02 PROCEDURE — 97110 THERAPEUTIC EXERCISES: CPT

## 2021-09-02 PROCEDURE — 72141 MRI NECK SPINE W/O DYE: CPT

## 2021-09-02 PROCEDURE — 99233 SBSQ HOSP IP/OBS HIGH 50: CPT | Performed by: NURSE PRACTITIONER

## 2021-09-02 PROCEDURE — 85025 COMPLETE CBC W/AUTO DIFF WBC: CPT | Performed by: INTERNAL MEDICINE

## 2021-09-02 PROCEDURE — 80061 LIPID PANEL: CPT | Performed by: NURSE PRACTITIONER

## 2021-09-02 RX ORDER — ROSUVASTATIN CALCIUM 20 MG/1
20 TABLET, COATED ORAL NIGHTLY
Status: DISCONTINUED | OUTPATIENT
Start: 2021-09-02 | End: 2021-09-04 | Stop reason: HOSPADM

## 2021-09-02 RX ORDER — ASPIRIN 81 MG/1
81 TABLET ORAL DAILY
Status: DISCONTINUED | OUTPATIENT
Start: 2021-09-02 | End: 2021-09-04 | Stop reason: HOSPADM

## 2021-09-02 RX ADMIN — SODIUM CHLORIDE, PRESERVATIVE FREE 3 ML: 5 INJECTION INTRAVENOUS at 21:43

## 2021-09-02 RX ADMIN — HYDROCODONE BITARTRATE AND ACETAMINOPHEN 1 TABLET: 5; 325 TABLET ORAL at 07:43

## 2021-09-02 RX ADMIN — ASPIRIN 81 MG: 81 TABLET, COATED ORAL at 17:06

## 2021-09-02 RX ADMIN — ENOXAPARIN SODIUM 50 MG: 60 INJECTION, SOLUTION INTRAVENOUS; SUBCUTANEOUS at 23:56

## 2021-09-02 RX ADMIN — SODIUM CHLORIDE, PRESERVATIVE FREE 3 ML: 5 INJECTION INTRAVENOUS at 08:06

## 2021-09-02 RX ADMIN — LORAZEPAM 0.5 MG: 2 INJECTION INTRAMUSCULAR; INTRAVENOUS at 08:05

## 2021-09-02 RX ADMIN — FAMOTIDINE 20 MG: 20 TABLET, FILM COATED ORAL at 08:05

## 2021-09-02 RX ADMIN — ACETAMINOPHEN 650 MG: 325 TABLET, FILM COATED ORAL at 17:06

## 2021-09-02 NOTE — PROGRESS NOTES
Orthopedic Progress Note      Patient: Marcella Stephens    YOB: 1923    Medical Record Number: [id number]    Attending Physician: Grace Girard MD    Date of Admission: 8/27/2021  4:17 PM    Admitting Dx:  Closed fracture of right hip, initial encounter (CMS/HCC) [S72.001A]  Traumatic rhabdomyolysis, initial encounter (CMS/HCC) [T79.6XXA]    Status Post: Cephalomedullary fixation of right intertrochanteric femur fracture    Post Operative Day Number: 5    Current Problem List:   Patient Active Problem List   Diagnosis    Closed fracture of right hip (CMS/[facility])    Traumatic rhabdomyolysis (CMS/[facility])    Fall as cause of accidental injury at home as place of occurrence    Acute hyperglycemia    Alzheimer's dementia (CMS/[facility])    Acute kidney failure (CMS/[facility])    Anemia    Vitamin D deficiency    Weakness of right upper extremity    Acute retention of urine    Hydronephrosis    Acute deep vein thrombosis (DVT) of brachial vein of right upper extremity (CMS/[facility])         Past Medical History:   Diagnosis Date    Prediabetes        Current Medications:  Scheduled Meds:docusate sodium, 100 mg, Oral, BID  enoxaparin, 1 mg/kg, Subcutaneous, Q24H  famotidine, 20 mg, Oral, Daily  gadobenate dimeglumine, 10 mL, Intravenous, Once in imaging  polyethylene glycol, 17 g, Oral, Daily  sodium chloride, 3 mL, Intravenous, Q12H  vitamin D, 50,000 Units, Oral, Q7 Days      PRN Meds:.  acetaminophen    acetaminophen    HYDROcodone-acetaminophen    HYDROmorphone **AND** naloxone    melatonin    ondansetron **OR** ondansetron    Pharmacy to Dose enoxaparin (LOVENOX)    [COMPLETED] Insert peripheral IV **AND** sodium chloride    SUBJECTIVE: 89 y.o.  female. Confused this morning.      OBJECTIVE:   Vitals:    09/01/21 1500 09/01/21 2245 09/02/21 0712 09/02/21 1045   BP: 113/68 113/78 147/77 107/63   BP Location: Left arm Left arm Left arm Left arm   Patient Position: Lying Lying Lying Lying   Pulse: 69 78 86 81    Resp: 16 14 16 16   Temp: 96.9 °F (36.1 °C) 97.8 °F (36.6 °C) 97.1 °F (36.2 °C) 96.8 °F (36 °C)   TempSrc: Skin Oral Skin Skin   SpO2: 99% 100% 92% 100%   Weight:       Height:         I/O last 3 completed shifts:  In: 660 [P.O.:660]  Out: 2250 [Urine:2250]    Diagnostic Tests:   Lab Results (last 24 hours)       ** No results found for the last 24 hours. **            PHYSICAL EXAM:  Right hip and thigh dressings remain dry and intact.  Calf soft and nontender.  Unable to get patient to cooperate with NV exam due to increased sedation and confusion. Patient was given Ativan this morning in preparation for MRI to be done later today which could account for change in mental status.  Right arm remains swollen.  Still has tenderness when touched.  Hand remains warm and has strong pulse.  Capillary refill brisk. Not progressing with PT.        ASSESSMENT & PLAN:    For MRI Cervical spine and Brain today.      Continue to mobilize patient with PT.          Date: 9/2/2021    Heavenly Baxter RN      We will await MRI results, continue to have OT see. We will continue to follow.    Louis Scruggs MD

## 2021-09-02 NOTE — THERAPY TREATMENT NOTE
Patient Name: Marcella Stephens  : 1923    MRN: [id number]                              Today's Date: 2021       Admit Date: 2021    Visit Dx:     ICD-10-CM ICD-9-CM   1. Closed fracture of right hip, initial encounter (CMS/[facility])  S72.001A 820.8   2. Traumatic rhabdomyolysis, initial encounter (CMS/[facility])  T79.6XXA 958.6     Patient Active Problem List   Diagnosis   • Closed fracture of right hip (CMS/[facility])   • Traumatic rhabdomyolysis (CMS/[facility])   • Fall as cause of accidental injury at home as place of occurrence   • Acute hyperglycemia   • Alzheimer's dementia (CMS/[facility])   • Acute kidney failure (CMS/[facility])   • Anemia   • Vitamin D deficiency   • Weakness of right upper extremity   • Acute retention of urine   • Hydronephrosis   • Acute deep vein thrombosis (DVT) of brachial vein of right upper extremity (CMS/[facility])     Past Medical History:   Diagnosis Date   • Prediabetes      Past Surgical History:   Procedure Laterality Date   • BACK SURGERY     • FEMUR IM NAILING/RODDING Right 2021    Procedure: FEMUR INTRAMEDULLARY NAILING/RODDING;  Surgeon: Louis Scruggs MD;  Location: [facility];  Service: Orthopedics;  Laterality: Right;   • KNEE SURGERY       General Information     Row Name 21 1520          Physical Therapy Time and Intention    Document Type  therapy note (daily note)  -MS     Mode of Treatment  physical therapy;individual therapy  -MS     Row Name 21 1520          General Information    Patient Profile Reviewed  yes  -MS     Existing Precautions/Restrictions  (S) fall Exit alarm  -MS     Barriers to Rehab  cognitive status  -MS     Row Name 21 1520          Cognition    Orientation Status (Cognition)  unable/difficult to assess  -MS       User Key  (r) = Recorded By, (t) = Taken By, (c) = Cosigned By    Initials Name Provider Type    Tobi Feng, PT Physical Therapist        Mobility     Row Name 21 1521          Bed Mobility    Supine-Sit  "[place] (Bed Mobility)  maximum assist (25% patient effort);2 person assist  -MS     Sit-Supine [place] (Bed Mobility)  maximum assist (25% patient effort);2 person assist  -MS     Assistive Device (Bed Mobility)  draw sheet  -MS     Comment (Bed Mobility)  (S) Once sitting EOB, pt. requires Min/Mod assist x 1 for static sitting balance and Mod assist x 2 for dynamic sitting balance.  Pt. c/o pain \"all over\" throughout and is unable to maintain a safe sitting balance. Pt. also very confused and yelling out for \"Joe\" who pt. reports is her son.  -MS     Row Name 09/02/21 1521          Mobility    Right Lower Extremity (Weight-bearing Status)  weight-bearing as tolerated (WBAT)  -MS       User Key  (r) = Recorded By, (t) = Taken By, (c) = Cosigned By    Initials Name Provider Type    Tobi Feng, PT Physical Therapist        Obj/Interventions     Row Name 09/02/21 1523          Motor Skills    Therapeutic Exercise  -- Right Hip ORIF ther. ex. program x 5-10 reps completed with assist.  -MS       User Key  (r) = Recorded By, (t) = Taken By, (c) = Cosigned By    Initials Name Provider Type    MS PinedoerTobi, PT Physical Therapist        Goals/Plan    No documentation.       Clinical Impression     Row Name 09/02/21 1524          Pain    Additional Documentation  Pain Scale: FACES Pre/Post-Treatment (Group)  -MS     Row Name 09/02/21 1524          Pain Scale: Numbers Pre/Post-Treatment    Pain Location - Side  Right  -MS     Pain Location  hip;knee  -MS     Pre/Posttreatment Pain Comment  Pt. reports pain \"all over\" but only moans when she attempts to move her Right L.E.  -MS     Pain Intervention(s)  Medication (See MAR);Elevated;Nursing Notified;Repositioned;Cold applied  -MS     Row Name 09/02/21 1524          Pain Scale: FACES Pre/Post-Treatment    Pain: FACES Scale, Pretreatment  4-->hurts little more  -MS     Posttreatment Pain Rating  2-->hurts little bit  -MS     Row Name 09/02/21 1524 "          Positioning and Restraints    Pre-Treatment Position  in bed  -MS     Post Treatment Position  bed  -MS     In Bed  notified nsg;supine;call light within reach;encouraged to call for assist;exit alarm on All lines intact.  -MS       User Key  (r) = Recorded By, (t) = Taken By, (c) = Cosigned By    Initials Name Provider Type    Tobi Feng, PT Physical Therapist        Outcome Measures     Row Name 09/02/21 1525          How much help from another person do you currently need...    Turning from your back to your side while in flat bed without using bedrails?  2  -MS     Moving from lying on back to sitting on the side of a flat bed without bedrails?  2  -MS     Moving to and from a bed to a chair (including a wheelchair)?  2  -MS     Standing up from a chair using your arms (e.g., wheelchair, bedside chair)?  2  -MS     Climbing 3-5 steps with a railing?  1  -MS     To walk in hospital room?  1  -MS     AM-PAC 6 Clicks Score (PT)  10  -MS     Row Name 09/02/21 1525          Functional Assessment    Outcome Measure Options  AM-PAC 6 Clicks Basic Mobility (PT)  -MS       User Key  (r) = Recorded By, (t) = Taken By, (c) = Cosigned By    Initials Name Provider Type    Tobi Feng, PT Physical Therapist                       Physical Therapy Education                 Title: PT OT SLP Therapies (In Progress)     Topic: Physical Therapy (In Progress)     Point: Mobility training (In Progress)     Learning Progress Summary           Patient Nonacceptance, E,D, NR by MS at 9/2/2021 1525    Acceptance, E,TB,D, NR by KB at 9/2/2021 0357    Acceptance, E,D, NR by MS at 9/1/2021 1527    Acceptance, E,TB, NL,NR by CS at 8/31/2021 1441    Acceptance, E,D, VU,NR by MS at 8/30/2021 1250    Acceptance, E, NR by DJ at 8/29/2021 1106   Family Acceptance, E, NR by DJ at 8/29/2021 1106                   Point: Home exercise program (In Progress)     Learning Progress Summary           Patient Nonacceptance,  E,D, NR by MS at 9/2/2021 1525    Acceptance, E,TB,D, NR by KB at 9/2/2021 0357    Acceptance, E,D, NR by MS at 9/1/2021 1527    Acceptance, E,TB, NL,NR by CS at 8/31/2021 1441    Acceptance, E,D, VU,NR by MS at 8/30/2021 1250    Acceptance, E, NR by DJ at 8/29/2021 1106   Family Acceptance, E, NR by DJ at 8/29/2021 1106                   Point: Body mechanics (In Progress)     Learning Progress Summary           Patient Nonacceptance, E,D, NR by MS at 9/2/2021 1525    Acceptance, E,TB,D, NR by KB at 9/2/2021 0357    Acceptance, E,D, NR by MS at 9/1/2021 1527    Acceptance, E,TB, NL,NR by CS at 8/31/2021 1441    Acceptance, E,D, VU,NR by MS at 8/30/2021 1250    Acceptance, E, NR by DJ at 8/29/2021 1106   Family Acceptance, E, NR by DJ at 8/29/2021 1106                   Point: Precautions (In Progress)     Learning Progress Summary           Patient Nonacceptance, E,D, NR by MS at 9/2/2021 1525    Acceptance, E,TB,D, NR by KB at 9/2/2021 0357    Acceptance, E,D, NR by MS at 9/1/2021 1527    Acceptance, E,TB, NL,NR by CS at 8/31/2021 1441    Acceptance, E,D, VU,NR by MS at 8/30/2021 1250    Acceptance, E, NR by DJ at 8/29/2021 1106   Family Acceptance, E, NR by DJ at 8/29/2021 1106                               User Key     Initials Effective Dates Name Provider Type Discipline     06/16/21 -  Bre Cevallos, RN Registered Nurse Nurse    MS 06/16/21 -  Tobi Beauchamp, PT Physical Therapist PT    DJ 10/25/19 -  Ella Maria PT Physical Therapist PT    CS 02/02/21 -  Fe Fish RN Registered Nurse Nurse              PT Recommendation and Plan     Plan of Care Reviewed With: patient  Outcome Summary: Pt. requires Max. assist x2 for bed mobility this date.  Once sitting EOB, pt. requires Min/Mod. assist x1 for static sitting balance and Mod. assist x 2 for dynamic sitting balance.  Pt. with difficulty maintaining a safe sitting position EOB and will often lean posterior to return to bed.  Pt. presents  "with confusion and is fixated on calling out for her son \"Joe!\".  No family present in room at time of therapy session.  Right Hip ORIF ther. ex. program x 5-10 reps completed (assist with all).  Unable to attempt sit <-> stand transfer this date as pt. cannot maintain a safe sitting position and becomes resistive with verbal/tactile cueing.     Time Calculation:   PT Charges     Row Name 09/02/21 1529             Time Calculation    Start Time  1315  -MS      Stop Time  1330  -MS      Time Calculation (min)  15 min  -MS      PT Received On  09/02/21  -MS      PT - Next Appointment  09/03/21  -MS         Time Calculation- PT    Total Timed Code Minutes- PT  14 minute(s)  -MS        User Key  (r) = Recorded By, (t) = Taken By, (c) = Cosigned By    Initials Name Provider Type    Tobi Feng, PT Physical Therapist        Therapy Charges for Today     Code Description Service Date Service Provider Modifiers Qty    47868484473 HC PT THER PROC EA 15 MIN 9/1/2021 Tobi Beauchamp, PT GP 1    64649334848 HC PT THER SUPP EA 15 MIN 9/1/2021 Tobi Beauchamp, PT GP 1    48157448645 HC PT THER PROC EA 15 MIN 9/2/2021 Tobi Beauchamp, PT GP 1    54243665071 HC PT THER SUPP EA 15 MIN 9/2/2021 Tobi Beauchamp, PT GP 1          PT G-Codes  Outcome Measure Options: AM-PAC 6 Clicks Basic Mobility (PT)  AM-PAC 6 Clicks Score (PT): 10  AM-PAC 6 Clicks Score (OT): 10    Tobi Beauchamp PT  9/2/2021    "

## 2021-09-02 NOTE — PROGRESS NOTES
Name: Marcella Stephens ADMIT: 2021   : 1923  PCP: Noemi Chester MD    MRN: [id number] LOS: 6 days   AGE/SEX: 89 y.o. female  ROOM: [facility]     Subjective   Subjective   Patient is a poor historian.  Patient is somnolent today and more confused after receiving Ativan in preparation for an MRI of the brain and the neck.  Still with right upper extremity pain no other history could be elicited today         Objective   Objective   Vital Signs  Temp:  [96.8 °F (36 °C)-97.8 °F (36.6 °C)] 96.8 °F (36 °C)  Heart Rate:  [69-86] 81  Resp:  [14-16] 16  BP: (107-147)/(63-78) 107/63  SpO2:  [92 %-100 %] 100 %  on  Flow (L/min):  [2] 2;   Device (Oxygen Therapy): room air    Intake/Output Summary (Last 24 hours) at 2021 1258  Last data filed at 2021 0900  Gross per 24 hour   Intake 780 ml   Output 1575 ml   Net -795 ml     Body mass index is 21.03 kg/m².      21  1629   Weight: 52.2 kg (115 lb)     Physical Exam    General.  Elderly female.  Patient is somnolent.  Arousable.  Oriented x0..  No apparent pain distress or diaphoresis.    Friable.  Lethargic.  Eyes.  Pupils equal round and reactive.  No pallor or jaundice.  Normal conjunctive and lids.  Positive bilateral arcus analysis and status post bilateral cataract surgery.    Oral cavity.  Dry mucous membrane.  Neck.  No JVD.  Supple.  No lymphadenopathy or thyromegaly.  Cardiovascular.  Regular rate and rhythm.  Grade 2 systolic murmur.  Chest.  Clear to auscultation bilaterally with no added sounds.  Abdomen.  Soft lax.  No tenderness.  No organomegaly.  No guarding or rebound.  Extremities.  No clubbing cyanosis or edema.  Dressed right hip wound.   Improved swelling in the right upper extremity with tenderness of the right hand..  CNS.  There is a right upper extremity weakness.      Results Review:      Results from last 7 days   Lab Units 21  0341 21  0503 21  0722 21  0922 21  0706 21  163    SODIUM mmol/L 137 134* 131* 131* 139 139   POTASSIUM mmol/L 3.9 4.3 4.6 4.3 4.3 4.4   CHLORIDE mmol/L 105 104 99 98 105 101   CO2 mmol/L 23.9 20.7* 21.2* 21.8* 24.2 26.5   BUN mg/dL 24* 46* 63* 50* 44* 37*   CREATININE mg/dL 0.36* 0.94 1.83* 1.08* 0.69 0.73   GLUCOSE mg/dL 95 83 106* 196* 98 117*   CALCIUM mg/dL 8.3 8.3 8.4 8.3 8.8 9.2   AST (SGOT) U/L  --   --   --   --   --  38*   ALT (SGPT) U/L  --   --   --   --   --  20     Estimated Creatinine Clearance: 33.1 mL/min (A) (by C-G formula based on SCr of 0.36 mg/dL (L)).  Results from last 7 days   Lab Units 08/29/21  0922   HEMOGLOBIN A1C % 5.53         Results from last 7 days   Lab Units 09/01/21  0341 08/31/21  0503 08/29/21  0922 08/28/21  1628 08/28/21  0706 08/27/21  1636   CK TOTAL U/L 159 201* 383* 654* 851* 1,273*             Results from last 7 days   Lab Units 08/29/21  0922   MAGNESIUM mg/dL 2.3           Invalid input(s): LDLCALC  Results from last 7 days   Lab Units 09/01/21  0341 08/31/21  0503 08/31/21  0503 08/30/21  0722 08/29/21  0922 08/29/21  0922 08/28/21  0706 08/28/21  0706 08/27/21  1636 08/27/21  1636   WBC 10*3/mm3 7.35  --  7.61 8.53  --  8.60  --  7.46  --  9.05   HEMOGLOBIN g/dL 10.6*  --  10.6* 7.8*  --  8.6*  --  10.3*  --  10.5*   HEMATOCRIT % 32.2*  --  33.8* 24.4*  --  26.5*  --  31.1*  --  31.9*   PLATELETS 10*3/mm3 108*  --  84* 107*  --  155  --  148  --  159   MCV fL 88.5   < > 91.6 90.0   < > 90.1   < > 88.9   < > 87.9   MCH pg 29.1   < > 28.7 28.8   < > 29.3   < > 29.4   < > 28.9   MCHC g/dL 32.9   < > 31.4* 32.0   < > 32.5   < > 33.1   < > 32.9   RDW % 13.7   < > 14.3 14.3   < > 14.3   < > 14.3   < > 14.1   RDW-SD fl 44.4   < > 48.2 46.6   < > 46.9   < > 46.1   < > 44.3   MPV fL 10.5   < > 11.4 10.9   < > 11.2   < > 10.6   < > 10.8   NEUTROPHIL % % 67.3   < > 73.9 73.9   < > 79.2*  --   --    < > 80.2*   LYMPHOCYTE % % 19.5*   < > 15.9* 16.1*   < > 14.2*  --   --    < > 10.7*   MONOCYTES % % 8.2   < > 6.4 8.7   < > 5.9   --   --    < > 8.4   EOSINOPHIL % % 3.7   < > 2.8 0.5   < > 0.1*  --   --    < > 0.0*   BASOPHIL % % 0.3   < > 0.3 0.1   < > 0.1  --   --    < > 0.1   IMM GRAN % %  --   --  0.7*  --   --   --   --   --    < > 0.6*   NEUTROS ABS 10*3/mm3 4.96   < > 5.63 6.31   < > 6.81  --   --    < > 7.26*   LYMPHS ABS 10*3/mm3 1.43   < > 1.21 1.37   < > 1.22  --   --    < > 0.97   MONOS ABS 10*3/mm3 0.60   < > 0.49 0.74   < > 0.51  --   --    < > 0.76   EOS ABS 10*3/mm3 0.27   < > 0.21 0.04   < > 0.01  --   --    < > 0.00   BASOS ABS 10*3/mm3 0.02   < > 0.02 0.01   < > 0.01  --   --    < > 0.01   IMMATURE GRANS (ABS) 10*3/mm3  --   --  0.05  --   --   --   --   --    < > 0.05   NRBC /100 WBC  --   --  0.0  --   --   --   --   --    < > 0.0    < > = values in this interval not displayed.     Results from last 7 days   Lab Units 08/27/21  1759   INR  1.10                                       Imaging:  Imaging Results (Last 24 Hours)     Procedure Component Value Units Date/Time    MRI Cervical Spine Without Contrast [[id number]] Collected: 09/02/21 1053     Updated: 09/02/21 1109    Narrative:      MRI OF THE BRAIN AND CERVICAL SPINE WITHOUT CONTRAST     CLINICAL HISTORY: Right hand weakness. Multiple falls.     MRI OF THE BRAIN:     TECHNIQUE: MRI of the brain was obtained with sagittal T1, axial T1,  axial FLAIR, axial T2, axial diffusion, and axial gradient echo images.     FINDINGS:      There is a punctate focus of diffusion-weighted hyperintensity within  the posterior aspect of the left centrum semiovale which measures up to  approximately 2 mm in diameter. This finding is suspicious for a tiny  acute to subacute infarct which could be an embolic event or could be  representative of an in situ small vessel thrombotic event.     Severe confluent changes of chronic small vessel ischemic phenomena are  identified. The ventricles, sulci, and cisterns are age appropriate.  There are no abnormal areas of susceptibility artifact.  The major  intracranial flow-related signal voids are within normal limits. The  midline intracranial anatomy is unremarkable.     There is mucosal thickening within the right maxillary sinus and the  ethmoid air cells. An air-fluid level is identified within the right  maxillary sinus.       Impression:         Within the posterior aspect of the left centrum semiovale, there is a 2  mm focus of diffusion-weighted hyperintensity which is suspicious for an  acute to subacute infarct and this could either be an embolic event or  an in situ small vessel thrombotic event.     Severe confluent changes of chronic small vessel ischemic phenomena are  identified.     MRI OF THE CERVICAL SPINE:     TECHNIQUE: MRI of the cervical spine was obtained with sagittal T1,  gradient echo, and T2-weighted images. Additionally, there are axial  gradient echo, axial T2, and oblique sagittal T2-weighted images through  the cervical spine.     FINDINGS:      The study is compromised by marked motion artifact.     At C2-3, there is no significant degree of canal or foraminal stenosis.     At C3-4, there is moderate right foraminal narrowing secondary to a  combination of uncovertebral joint hypertrophy and facet arthrosis.  There is no significant degree of left foraminal narrowing. A disc  osteophyte complex results in a mild degree of canal stenosis.     At C4-5, there is mild left foraminal narrowing secondary to facet  hypertrophic change. There is no significant degree of right foraminal  narrowing. No significant canal stenosis is identified.     At C5-6, there is a disc osteophyte complex which results in a mild  degree of canal narrowing. There is moderate left facet hypertrophic  change. However, mild left foraminal narrowing is seen.      At C6-7, again, a disc osteophyte complex results in a mild degree of  canal narrowing. There is no significant degree of foraminal stenosis.     At C7-T1, there is no significant canal or  foraminal narrowing.     The cervical spinal cord demonstrates no convincing signal abnormality.  There are no abnormal areas of susceptibility artifact. The visualized  contents of the cranial vault are unremarkable.     Incidental note is made of bilateral shoulder effusions.     IMPRESSION:      The current exam is compromised by marked motion artifact.     Only relatively mild degrees of multilevel canal narrowing are noted.     At the C3-4 level, there is a moderate degree of right foraminal  stenosis secondary to a combination of uncovertebral joint hypertrophy  and facet arthrosis. This is the level of the most prominent foraminal  compromise.     The remaining multilevel degenerative phenomena within the cervical  spine are as discussed in detail above.     Apparently, there is a history of falls. If there is any concern for a  cervical spine fracture, I would strongly suggest further evaluation  with a CT scan of the cervical spine.     Incidental note is made of bilateral shoulder effusions.     This report was finalized on 9/2/2021 11:06 AM by Dr. Delgado Wang M.D.       MRI Brain Without Contrast [[id number]] Collected: 09/02/21 1053     Updated: 09/02/21 1109    Narrative:      MRI OF THE BRAIN AND CERVICAL SPINE WITHOUT CONTRAST     CLINICAL HISTORY: Right hand weakness. Multiple falls.     MRI OF THE BRAIN:     TECHNIQUE: MRI of the brain was obtained with sagittal T1, axial T1,  axial FLAIR, axial T2, axial diffusion, and axial gradient echo images.     FINDINGS:      There is a punctate focus of diffusion-weighted hyperintensity within  the posterior aspect of the left centrum semiovale which measures up to  approximately 2 mm in diameter. This finding is suspicious for a tiny  acute to subacute infarct which could be an embolic event or could be  representative of an in situ small vessel thrombotic event.     Severe confluent changes of chronic small vessel ischemic phenomena are  identified. The  ventricles, sulci, and cisterns are age appropriate.  There are no abnormal areas of susceptibility artifact. The major  intracranial flow-related signal voids are within normal limits. The  midline intracranial anatomy is unremarkable.     There is mucosal thickening within the right maxillary sinus and the  ethmoid air cells. An air-fluid level is identified within the right  maxillary sinus.       Impression:         Within the posterior aspect of the left centrum semiovale, there is a 2  mm focus of diffusion-weighted hyperintensity which is suspicious for an  acute to subacute infarct and this could either be an embolic event or  an in situ small vessel thrombotic event.     Severe confluent changes of chronic small vessel ischemic phenomena are  identified.     MRI OF THE CERVICAL SPINE:     TECHNIQUE: MRI of the cervical spine was obtained with sagittal T1,  gradient echo, and T2-weighted images. Additionally, there are axial  gradient echo, axial T2, and oblique sagittal T2-weighted images through  the cervical spine.     FINDINGS:      The study is compromised by marked motion artifact.     At C2-3, there is no significant degree of canal or foraminal stenosis.     At C3-4, there is moderate right foraminal narrowing secondary to a  combination of uncovertebral joint hypertrophy and facet arthrosis.  There is no significant degree of left foraminal narrowing. A disc  osteophyte complex results in a mild degree of canal stenosis.     At C4-5, there is mild left foraminal narrowing secondary to facet  hypertrophic change. There is no significant degree of right foraminal  narrowing. No significant canal stenosis is identified.     At C5-6, there is a disc osteophyte complex which results in a mild  degree of canal narrowing. There is moderate left facet hypertrophic  change. However, mild left foraminal narrowing is seen.      At C6-7, again, a disc osteophyte complex results in a mild degree of  canal  narrowing. There is no significant degree of foraminal stenosis.     At C7-T1, there is no significant canal or foraminal narrowing.     The cervical spinal cord demonstrates no convincing signal abnormality.  There are no abnormal areas of susceptibility artifact. The visualized  contents of the cranial vault are unremarkable.     Incidental note is made of bilateral shoulder effusions.     IMPRESSION:      The current exam is compromised by marked motion artifact.     Only relatively mild degrees of multilevel canal narrowing are noted.     At the C3-4 level, there is a moderate degree of right foraminal  stenosis secondary to a combination of uncovertebral joint hypertrophy  and facet arthrosis. This is the level of the most prominent foraminal  compromise.     The remaining multilevel degenerative phenomena within the cervical  spine are as discussed in detail above.     Apparently, there is a history of falls. If there is any concern for a  cervical spine fracture, I would strongly suggest further evaluation  with a CT scan of the cervical spine.     Incidental note is made of bilateral shoulder effusions.     This report was finalized on 9/2/2021 11:06 AM by Dr. Delgado Wang M.D.                I reviewed the patient's new clinical results / labs / tests / procedures      Assessment/Plan     Active Hospital Problems    Diagnosis  POA   • **Fall as cause of accidental injury at home as place of occurrence [W19.XXXA, Y92.009]  Not Applicable   • Acute deep vein thrombosis (DVT) of brachial vein of right upper extremity (CMS/HCC) [I82.621]  Yes   • Weakness of right upper extremity [R29.898]  Yes   • Acute retention of urine [R33.8]  Yes   • Hydronephrosis [N13.30]  Yes   • Acute kidney failure (CMS/HCC) [N17.9]  Yes   • Anemia [D64.9]  Yes   • Vitamin D deficiency [E55.9]  Yes   • Acute hyperglycemia [R73.9]  Yes   • Alzheimer's dementia (CMS/HCC) [G30.9, F02.80]  Yes   • Traumatic rhabdomyolysis (CMS/[facility])  [T79.6XXA]  Yes   • Closed fracture of right hip (CMS/HCC) [S72.001A]  Yes      Resolved Hospital Problems   No resolved problems to display.           · Status post fall leading to right hip fracture s/p open reduction and internal fixation.  Aspirin stopped as the patient is anticoagulated now.  Currently on PT.  Will need placement.  · Traumatic rhabdomyolysis.  Resolved.  Normal CK.  IV fluid DC'd.    · Acute kidney failure/acute urinary retention/right hydronephrosis.  Patient was volume depleted.  On no nephrotoxic agents.  Good urine output.  Mostly hypovolemia/rhabdomyolysis/hypotension/hydronephrosis induced.  Patient appears euvolemic now.  Kidney function back to normal.  IV fluid DC'd renal ultrasound with distended bladder and mild right hydronephrosis.  Dupree catheter in place.  Urology recommend continuation of the Dupree catheter and tells ambulatory and then voiding trials.  · Chronic disease anemia/thrombocytopenia..  Now a component of postoperative anemia.  Baseline hemoglobin is around 10.5.    Status post transfusion with appropriate hemoglobin response.  Hemoglobin stable.  Hemoccult positive stool but will defer GI work-up secondary to the patient's age.  No active bleeding.  Platelets are stable.  Closely monitor CBC.  · Vitamin D deficiency.  Supplementation started by Ortho.  · Glucose intolerance.  A1c 5.3.  · Dementia.   · Right upper extremity weakness/swelling.  Neurology saw the patient.  Ordering MRI of the brain and C-spine.  To evaluate for right upper extremity weakness.  · Acute right upper extremity DVT.  On anticoagulation with Lovenox.  Can be switched to Eliquis tomorrow   · swallow disorder.  Speech saw the patient.  Diet advanced to soft mechanical and thin liquids.      Discussed with patient.  Disposition.  Going back to skilled facility.  Probably in 1 to 2 days    Grace Girard MD  [facility]  09/02/21  12:58 EDT

## 2021-09-02 NOTE — PROGRESS NOTES
"DOS: 2021  NAME: Marcella Stephens   : 1923  PCP: Noemi Chester MD  Chief Complaint   Patient presents with   • Fall   • Arm Pain   • Leg Pain     Stroke      Subjective: Patient agitated confused somewhat combative on my exam.      No family at bedside.        Objective:  Vital signs: /63 (BP Location: Left arm, Patient Position: Lying)   Pulse 81   Temp 96.8 °F (36 °C) (Skin)   Resp 16   Ht 157.5 cm (62\")   Wt 52.2 kg (115 lb)   SpO2 100%   BMI 21.03 kg/m²       General Appearance:           Well developed, well nourished, well groomed, disheveled/agitated  HEENT:            Normocephalic.    Neck and Spine:         Normal range of motion.  Normal alignment. No mass.  Mild tenderness noted cervical spine.  No bruits.  Cardiac:                                 Regular rate and rhythm. No murmurs.  Peripheral Vasculature:       Radial and pedal pulses are equal and symmetric.   Extremities:                            Right upper extremity edematous; greater than last 48 hours.Normal joint      ROM on all extremities except R lower extremity and right forearm/wrist.        Right hand edema noted 2+    Skin:                                       Right hip surgical site clean dry and intact/well approximated     Neurological examination:  [employer] confused agitated not following commands yelling for her  who is not in the room at time of event  CN II:    Pupils are equal, round, and reactive to light. Normal visual acuity and visual fields.  Mild left eyelid ptosis noted  CN III IV VI:      Extraocular movements are full without nystagmus.   CN V:   Normal facial sensation and strength of muscles of mastication.  CN VII:             Facial movements are symmetric. No obvious weakness.  CN VIII:                                   Auditory acuity is normal.  CN IX & X:                              Symmetric palatal movement.  CN XI:  Sternocleidomastoid and trapezius are " normal.  No weakness.  CN XII:                                    The tongue is midline.  No atrophy or fasciculations.  Motor:  Normal muscle strength, bulk and tone in left upper extremity and left lower extremity right  forearm/hand/wrist 4 -/5.  Right lower extremity 4-/5 status post surgery.  No fasciculations, rigidity, spasticity, or abnormal movements.  Reflexes:         Plantar responses are flexor.  Sensation:       Normal to light touch in arms and legs.  Appears hypersensitive to light touch  Station and Gait:        Deferred  Coordination:                        Normal finger-to-nose on left; mildly impaired on right    Patient confused on exam today; was alert and oriented 9/1, right hand edema more pronounced    Laboratory results:  Lab Results   Component Value Date    GLUCOSE 95 09/01/2021    CALCIUM 8.3 09/01/2021     09/01/2021    K 3.9 09/01/2021    CO2 23.9 09/01/2021     09/01/2021    BUN 24 (H) 09/01/2021    CREATININE 0.36 (L) 09/01/2021    EGFRIFNONA >150 09/01/2021    BCR 66.7 (H) 09/01/2021    ANIONGAP 8.1 09/01/2021     Lab Results   Component Value Date    WBC 7.35 09/01/2021    HGB 10.6 (L) 09/01/2021    HCT 32.2 (L) 09/01/2021    MCV 88.5 09/01/2021     (L) 09/01/2021     No results found for: CHOL  Lab Results   Component Value Date    HDL 65 01/31/2019     Lab Results   Component Value Date     (H) 01/31/2019     Lab Results   Component Value Date    TRIG 110 01/31/2019         Lab 08/29/21  0922   HEMOGLOBIN A1C 5.53      Review and interpretation of imaging:  MRI OF THE BRAIN AND CERVICAL SPINE WITHOUT CONTRAST     CLINICAL HISTORY: Right hand weakness. Multiple falls.     MRI OF THE BRAIN:     TECHNIQUE: MRI of the brain was obtained with sagittal T1, axial T1,  axial FLAIR, axial T2, axial diffusion, and axial gradient echo images.     FINDINGS:      There is a punctate focus of diffusion-weighted hyperintensity within  the posterior aspect of the left  centrum semiovale which measures up to  approximately 2 mm in diameter. This finding is suspicious for a tiny  acute to subacute infarct which could be an embolic event or could be  representative of an in situ small vessel thrombotic event.     Severe confluent changes of chronic small vessel ischemic phenomena are  identified. The ventricles, sulci, and cisterns are age appropriate.  There are no abnormal areas of susceptibility artifact. The major  intracranial flow-related signal voids are within normal limits. The  midline intracranial anatomy is unremarkable.     There is mucosal thickening within the right maxillary sinus and the  ethmoid air cells. An air-fluid level is identified within the right  maxillary sinus.     IMPRESSION:     Within the posterior aspect of the left centrum semiovale, there is a 2  mm focus of diffusion-weighted hyperintensity which is suspicious for an  acute to subacute infarct and this could either be an embolic event or  an in situ small vessel thrombotic event.     Severe confluent changes of chronic small vessel ischemic phenomena are  identified.     MRI OF THE CERVICAL SPINE:     TECHNIQUE: MRI of the cervical spine was obtained with sagittal T1,  gradient echo, and T2-weighted images. Additionally, there are axial  gradient echo, axial T2, and oblique sagittal T2-weighted images through  the cervical spine.     FINDINGS:      The study is compromised by marked motion artifact.     At C2-3, there is no significant degree of canal or foraminal stenosis.     At C3-4, there is moderate right foraminal narrowing secondary to a  combination of uncovertebral joint hypertrophy and facet arthrosis.  There is no significant degree of left foraminal narrowing. A disc  osteophyte complex results in a mild degree of canal stenosis.     At C4-5, there is mild left foraminal narrowing secondary to facet  hypertrophic change. There is no significant degree of right foraminal  narrowing. No  significant canal stenosis is identified.     At C5-6, there is a disc osteophyte complex which results in a mild  degree of canal narrowing. There is moderate left facet hypertrophic  change. However, mild left foraminal narrowing is seen.      At C6-7, again, a disc osteophyte complex results in a mild degree of  canal narrowing. There is no significant degree of foraminal stenosis.     At C7-T1, there is no significant canal or foraminal narrowing.     The cervical spinal cord demonstrates no convincing signal abnormality.  There are no abnormal areas of susceptibility artifact. The visualized  contents of the cranial vault are unremarkable.     Incidental note is made of bilateral shoulder effusions.     IMPRESSION:      The current exam is compromised by marked motion artifact.     Only relatively mild degrees of multilevel canal narrowing are noted.     At the C3-4 level, there is a moderate degree of right foraminal  stenosis secondary to a combination of uncovertebral joint hypertrophy  and facet arthrosis. This is the level of the most prominent foraminal  compromise.     The remaining multilevel degenerative phenomena within the cervical  spine are as discussed in detail above.     Apparently, there is a history of falls. If there is any concern for a  cervical spine fracture, I would strongly suggest further evaluation  with a CT scan of the cervical spine.     Incidental note is made of bilateral shoulder effusions.     This report was finalized on 9/2/2021 11:06 AM by Dr. Delgado Wang M.D.         Diagnoses:   1.  Right hand weakness and edema; plain film revealed evidence of chronic distal radius fracture with residual anatomic deformity along with evidence of soft tissue edema noted.  2.  Intertrochanteric fractures; right that is post surgical repair  3.  Eyelid ptosis; left  4.  High risk for falls  5.  Rhabdomyolysis; resolving  6.  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 RUE DVT; on Lovenox          Plan:  · Lipid panel ordered: A1c 5.53  · TTE   · Crestor 20 mg daily  · Continue Lovenox for DVT prophylaxis   · Recommend low-dose aspirin at minimum if okay with Ortho    Case reviewed w/ attending neurologist Dr. Martínez 09/02 and he agrees with plan above    Nursing notified of findings/recommendations.    Giulia Gonzalez, NILS       15:12 EDT  Called number listed for daughter Bonita; no answer left message to call [facility] office back.     Giulia Gonzalez, APRN

## 2021-09-02 NOTE — SIGNIFICANT NOTE
09/02/21 1522   OTHER   Discipline occupational therapist   Rehab Time/Intention   Session Not Performed other (see comments);unable to treat, medical status change   Recommendation   OT - Next Appointment 09/03/21

## 2021-09-02 NOTE — PLAN OF CARE
Goal Outcome Evaluation:  Plan of Care Reviewed With: patient        Progress: no change  Outcome Summary: vss, rt hip dsgs intact with dried drainage, neurovascular status wnl, rue swollen and sore to patient, o2 at 2 liters, cantu remains until ambulation improves, incont stool, disoriented to time and place, MRI ordered and may be done today, pt to be premedicated with ativan, educated on using IS, discharge plans are pending

## 2021-09-02 NOTE — PLAN OF CARE
"Goal Outcome Evaluation:  Plan of Care Reviewed With: patient           Outcome Summary: Pt. requires Max. assist x2 for bed mobility this date.  Once sitting EOB, pt. requires Min/Mod. assist x1 for static sitting balance and Mod. assist x 2 for dynamic sitting balance.  Pt. with difficulty maintaining a safe sitting position EOB and will often lean posterior to return to bed.  Pt. presents with confusion and is fixated on calling out for her son \"Joe!\".  No family present in room at time of therapy session.  Right Hip ORIF ther. ex. program x 5-10 reps completed (assist with all).  Unable to attempt sit <-> stand transfer this date as pt. cannot maintain a safe sitting position and becomes resistive with verbal/tactile cueing.    Patient was wearing a face mask during this therapy encounter. Therapist used appropriate personal protective equipment including eye protection, mask, and gloves.  Mask used was standard procedure mask. Appropriate PPE was worn during the entire therapy session. Hand hygiene was completed before and after therapy session. Patient is not in enhanced droplet precautions.     "

## 2021-09-02 NOTE — CASE MANAGEMENT/SOCIAL WORK
Continued Stay Note  [facility]     Patient Name: Marcella Stephens  MRN: [id number]  Today's Date: 9/2/2021    Admit Date: 8/27/2021    Discharge Plan     Row Name 09/02/21 1639       Plan    Plan  [facility] -- Accepted.    Patient/Family in Agreement with Plan  yes    Plan Comments  Spoke with [facility] who still has a SNF bed for the patient whenever she's discharged. [facility] said they are not allowing pt visitation at this time. Updated the patient's daughter/Bonita who's agreeable. Patient will need an ambulance to transport at d/c. No other needs identified.        Discharge Codes    No documentation.       Expected Discharge Date and Time     Expected Discharge Date Expected Discharge Time    Sep 1, 2021             Loni Onofre RN

## 2021-09-03 ENCOUNTER — APPOINTMENT (OUTPATIENT)
Dept: CARDIOLOGY | Facility: HOSPITAL | Age: 86
End: 2021-09-03

## 2021-09-03 ENCOUNTER — TELEPHONE (OUTPATIENT)
Dept: ORTHOPEDIC SURGERY | Facility: CLINIC | Age: 86
End: 2021-09-03

## 2021-09-03 PROBLEM — Z86.73 RECENT CEREBROVASCULAR ACCIDENT (CVA): Status: ACTIVE | Noted: 2021-09-03

## 2021-09-03 LAB
ANION GAP SERPL CALCULATED.3IONS-SCNC: 8.1 MMOL/L (ref 5–15)
BH CV ECHO MEAS - ACS: 1.3 CM
BH CV ECHO MEAS - AO MAX PG (FULL): 13.8 MMHG
BH CV ECHO MEAS - AO MAX PG: 18.7 MMHG
BH CV ECHO MEAS - AO MEAN PG (FULL): 8 MMHG
BH CV ECHO MEAS - AO MEAN PG: 10 MMHG
BH CV ECHO MEAS - AO ROOT AREA (BSA CORRECTED): 1.9
BH CV ECHO MEAS - AO ROOT AREA: 6.2 CM^2
BH CV ECHO MEAS - AO ROOT DIAM: 2.8 CM
BH CV ECHO MEAS - AO V2 MAX: 216 CM/SEC
BH CV ECHO MEAS - AO V2 MEAN: 146 CM/SEC
BH CV ECHO MEAS - AO V2 VTI: 35.2 CM
BH CV ECHO MEAS - AVA(I,A): 1.6 CM^2
BH CV ECHO MEAS - AVA(I,D): 1.6 CM^2
BH CV ECHO MEAS - AVA(V,A): 1.4 CM^2
BH CV ECHO MEAS - AVA(V,D): 1.4 CM^2
BH CV ECHO MEAS - BSA(HAYCOCK): 1.5 M^2
BH CV ECHO MEAS - BSA: 1.5 M^2
BH CV ECHO MEAS - BZI_BMI: 21 KILOGRAMS/M^2
BH CV ECHO MEAS - BZI_METRIC_HEIGHT: 157.5 CM
BH CV ECHO MEAS - BZI_METRIC_WEIGHT: 52.2 KG
BH CV ECHO MEAS - EDV(CUBED): 68.9 ML
BH CV ECHO MEAS - EDV(MOD-SP2): 33 ML
BH CV ECHO MEAS - EDV(MOD-SP4): 46 ML
BH CV ECHO MEAS - EDV(TEICH): 74.2 ML
BH CV ECHO MEAS - EF(CUBED): 68.1 %
BH CV ECHO MEAS - EF(MOD-BP): 61.5 %
BH CV ECHO MEAS - EF(MOD-SP2): 60.6 %
BH CV ECHO MEAS - EF(MOD-SP4): 58.7 %
BH CV ECHO MEAS - EF(TEICH): 60.2 %
BH CV ECHO MEAS - ESV(CUBED): 22 ML
BH CV ECHO MEAS - ESV(MOD-SP2): 13 ML
BH CV ECHO MEAS - ESV(MOD-SP4): 19 ML
BH CV ECHO MEAS - ESV(TEICH): 29.6 ML
BH CV ECHO MEAS - FS: 31.7 %
BH CV ECHO MEAS - IVS/LVPW: 1
BH CV ECHO MEAS - IVSD: 1.4 CM
BH CV ECHO MEAS - LA DIMENSION: 2.7 CM
BH CV ECHO MEAS - LA/AO: 0.96
BH CV ECHO MEAS - LAT PEAK E' VEL: 6.3 CM/SEC
BH CV ECHO MEAS - LV DIASTOLIC VOL/BSA (35-75): 30.4 ML/M^2
BH CV ECHO MEAS - LV MASS(C)D: 151.3 GRAMS
BH CV ECHO MEAS - LV MASS(C)DI: 100.1 GRAMS/M^2
BH CV ECHO MEAS - LV MAX PG: 4.8 MMHG
BH CV ECHO MEAS - LV MEAN PG: 2 MMHG
BH CV ECHO MEAS - LV SYSTOLIC VOL/BSA (12-30): 12.6 ML/M^2
BH CV ECHO MEAS - LV V1 MAX: 110 CM/SEC
BH CV ECHO MEAS - LV V1 MEAN: 73.5 CM/SEC
BH CV ECHO MEAS - LV V1 VTI: 20.2 CM
BH CV ECHO MEAS - LVIDD: 4.1 CM
BH CV ECHO MEAS - LVIDS: 2.8 CM
BH CV ECHO MEAS - LVLD AP2: 5.6 CM
BH CV ECHO MEAS - LVLD AP4: 6.3 CM
BH CV ECHO MEAS - LVLS AP2: 5.6 CM
BH CV ECHO MEAS - LVLS AP4: 5.5 CM
BH CV ECHO MEAS - LVOT AREA (M): 2.8 CM^2
BH CV ECHO MEAS - LVOT AREA: 2.8 CM^2
BH CV ECHO MEAS - LVOT DIAM: 1.9 CM
BH CV ECHO MEAS - LVPWD: 1.2 CM
BH CV ECHO MEAS - MED PEAK E' VEL: 3.7 CM/SEC
BH CV ECHO MEAS - MV A MAX VEL: 142 CM/SEC
BH CV ECHO MEAS - MV DEC SLOPE: 428.5 CM/SEC^2
BH CV ECHO MEAS - MV DEC TIME: 0.19 SEC
BH CV ECHO MEAS - MV E MAX VEL: 71.1 CM/SEC
BH CV ECHO MEAS - MV E/A: 0.5
BH CV ECHO MEAS - MV MAX PG: 9.9 MMHG
BH CV ECHO MEAS - MV MEAN PG: 3 MMHG
BH CV ECHO MEAS - MV P1/2T MAX VEL: 91.2 CM/SEC
BH CV ECHO MEAS - MV P1/2T: 62.3 MSEC
BH CV ECHO MEAS - MV V2 MAX: 157 CM/SEC
BH CV ECHO MEAS - MV V2 MEAN: 76.8 CM/SEC
BH CV ECHO MEAS - MV V2 VTI: 26 CM
BH CV ECHO MEAS - MVA P1/2T LCG: 2.4 CM^2
BH CV ECHO MEAS - MVA(P1/2T): 3.5 CM^2
BH CV ECHO MEAS - MVA(VTI): 2.2 CM^2
BH CV ECHO MEAS - PA ACC TIME: 0.11 SEC
BH CV ECHO MEAS - PA MAX PG (FULL): 3.8 MMHG
BH CV ECHO MEAS - PA MAX PG: 5.7 MMHG
BH CV ECHO MEAS - PA PR(ACCEL): 30.4 MMHG
BH CV ECHO MEAS - PA V2 MAX: 119 CM/SEC
BH CV ECHO MEAS - RAP SYSTOLE: 3 MMHG
BH CV ECHO MEAS - RV MAX PG: 1.9 MMHG
BH CV ECHO MEAS - RV MEAN PG: 1 MMHG
BH CV ECHO MEAS - RV V1 MAX: 68.5 CM/SEC
BH CV ECHO MEAS - RV V1 MEAN: 46.3 CM/SEC
BH CV ECHO MEAS - RV V1 VTI: 12.6 CM
BH CV ECHO MEAS - RVSP: 39.5 MMHG
BH CV ECHO MEAS - SI(AO): 143.5 ML/M^2
BH CV ECHO MEAS - SI(CUBED): 31.1 ML/M^2
BH CV ECHO MEAS - SI(LVOT): 37.9 ML/M^2
BH CV ECHO MEAS - SI(MOD-SP2): 13.2 ML/M^2
BH CV ECHO MEAS - SI(MOD-SP4): 17.9 ML/M^2
BH CV ECHO MEAS - SI(TEICH): 29.6 ML/M^2
BH CV ECHO MEAS - SV(AO): 216.7 ML
BH CV ECHO MEAS - SV(CUBED): 47 ML
BH CV ECHO MEAS - SV(LVOT): 57.3 ML
BH CV ECHO MEAS - SV(MOD-SP2): 20 ML
BH CV ECHO MEAS - SV(MOD-SP4): 27 ML
BH CV ECHO MEAS - SV(TEICH): 44.7 ML
BH CV ECHO MEAS - TAPSE (>1.6): 1.5 CM
BH CV ECHO MEAS - TR MAX VEL: 302 CM/SEC
BH CV ECHO MEASUREMENTS AVERAGE E/E' RATIO: 14.22
BH CV XLRA - RV BASE: 3.6 CM
BH CV XLRA - RV LENGTH: 6.7 CM
BH CV XLRA - RV MID: 3.7 CM
BH CV XLRA - TDI S': 18.3 CM/SEC
BUN SERPL-MCNC: 10 MG/DL (ref 8–23)
BUN/CREAT SERPL: 23.8 (ref 7–25)
CALCIUM SPEC-SCNC: 8.7 MG/DL (ref 8.2–9.6)
CHLORIDE SERPL-SCNC: 100 MMOL/L (ref 98–107)
CO2 SERPL-SCNC: 26.9 MMOL/L (ref 22–29)
CREAT SERPL-MCNC: 0.42 MG/DL (ref 0.57–1)
GFR SERPL CREATININE-BSD FRML MDRD: 140 ML/MIN/1.73
GLUCOSE SERPL-MCNC: 134 MG/DL (ref 65–99)
LEFT ATRIUM VOLUME INDEX: 23.6 ML/M2
LV EF 2D ECHO EST: 60 %
POTASSIUM SERPL-SCNC: 3.8 MMOL/L (ref 3.5–5.2)
SODIUM SERPL-SCNC: 135 MMOL/L (ref 136–145)

## 2021-09-03 PROCEDURE — 99233 SBSQ HOSP IP/OBS HIGH 50: CPT | Performed by: NURSE PRACTITIONER

## 2021-09-03 PROCEDURE — 99024 POSTOP FOLLOW-UP VISIT: CPT | Performed by: ORTHOPAEDIC SURGERY

## 2021-09-03 PROCEDURE — 80048 BASIC METABOLIC PNL TOTAL CA: CPT | Performed by: INTERNAL MEDICINE

## 2021-09-03 PROCEDURE — 92526 ORAL FUNCTION THERAPY: CPT

## 2021-09-03 PROCEDURE — 97110 THERAPEUTIC EXERCISES: CPT

## 2021-09-03 PROCEDURE — 97535 SELF CARE MNGMENT TRAINING: CPT

## 2021-09-03 PROCEDURE — 93970 EXTREMITY STUDY: CPT

## 2021-09-03 PROCEDURE — 93306 TTE W/DOPPLER COMPLETE: CPT | Performed by: INTERNAL MEDICINE

## 2021-09-03 PROCEDURE — 93306 TTE W/DOPPLER COMPLETE: CPT

## 2021-09-03 PROCEDURE — 97530 THERAPEUTIC ACTIVITIES: CPT

## 2021-09-03 PROCEDURE — 93880 EXTRACRANIAL BILAT STUDY: CPT

## 2021-09-03 RX ORDER — LIDOCAINE 50 MG/G
1 PATCH TOPICAL
Status: DISCONTINUED | OUTPATIENT
Start: 2021-09-03 | End: 2021-09-04 | Stop reason: HOSPADM

## 2021-09-03 RX ADMIN — HYDROCODONE BITARTRATE AND ACETAMINOPHEN 1 TABLET: 5; 325 TABLET ORAL at 10:57

## 2021-09-03 RX ADMIN — APIXABAN 2.5 MG: 2.5 TABLET, FILM COATED ORAL at 14:22

## 2021-09-03 RX ADMIN — SODIUM CHLORIDE, PRESERVATIVE FREE 3 ML: 5 INJECTION INTRAVENOUS at 19:40

## 2021-09-03 RX ADMIN — LIDOCAINE 1 PATCH: 50 PATCH TOPICAL at 14:24

## 2021-09-03 RX ADMIN — DOCUSATE SODIUM 100 MG: 100 CAPSULE, LIQUID FILLED ORAL at 19:40

## 2021-09-03 RX ADMIN — SODIUM CHLORIDE, PRESERVATIVE FREE 3 ML: 5 INJECTION INTRAVENOUS at 09:11

## 2021-09-03 RX ADMIN — ROSUVASTATIN CALCIUM 20 MG: 20 TABLET, FILM COATED ORAL at 19:40

## 2021-09-03 RX ADMIN — ASPIRIN 81 MG: 81 TABLET, COATED ORAL at 09:08

## 2021-09-03 RX ADMIN — APIXABAN 2.5 MG: 2.5 TABLET, FILM COATED ORAL at 19:40

## 2021-09-03 RX ADMIN — FAMOTIDINE 20 MG: 20 TABLET, FILM COATED ORAL at 09:08

## 2021-09-03 NOTE — PROGRESS NOTES
"DOS: 9/3/2021  NAME: Marcella Stephens   : 1923  PCP: Noemi Chester MD  Chief Complaint   Patient presents with   • Fall   • Arm Pain   • Leg Pain     CC: Stroke    Subjective: No new events overnight per RN. Patient's daughter at bedside, apparently has swelling of the right leg now and worried about another DVT as he was recently diagnosed with LUE DVT. Patient herself is lying in bed,  denies headache or any new stroke/TIA symptoms.     Interval History  History taken from: patient chart family RN    Objective:  Vital signs: /56   Pulse 88   Temp 97.3 °F (36.3 °C) (Skin)   Resp 16   Ht 157.5 cm (62\")   Wt 52.2 kg (115 lb)   SpO2 95%   BMI 21.03 kg/m²       Physical Exam:  GENERAL: NAD  HEENT: Normocephalic, atraumatic   COR: RRR  Resp: Even and unlabored  Extremities: Decreased use of RUE and RLE d/t DVT and recent hip replacement    Neurological:   MS: Alert. Oriented to self, place. Naming intact. Poor reading d/t decreased visual acuity.  No neglect. Follows commands.   CN: II-XII grossly normal except for poor visual acuity (baseline). subtle left ptosis,   Motor: Normal strength and tone on left; unable to hold right side antigravity, strength at least 2/5. .  Sensory: Intact to light touch in arms and legs  Station and Gait: Unable to get up and walk.  Coordination: Normal finger to nose on left.     Current Medications:  Scheduled Medications:apixaban, 2.5 mg, Oral, Q12H  aspirin, 81 mg, Oral, Daily  docusate sodium, 100 mg, Oral, BID  famotidine, 20 mg, Oral, Daily  gadobenate dimeglumine, 10 mL, Intravenous, Once in imaging  lidocaine, 1 patch, Transdermal, Q24H  polyethylene glycol, 17 g, Oral, Daily  rosuvastatin, 20 mg, Oral, Nightly  sodium chloride, 3 mL, Intravenous, Q12H  vitamin D, 50,000 Units, Oral, Q7 Days      Infusions:    PRN Medications:  •  acetaminophen  •  acetaminophen  •  HYDROcodone-acetaminophen  •  HYDROmorphone **AND** naloxone  •  melatonin  •  " ondansetron **OR** ondansetron  •  [COMPLETED] Insert peripheral IV **AND** sodium chloride    Medications Reviewed:     Laboratory results:  Lab Results   Component Value Date    GLUCOSE 134 (H) 09/03/2021    CALCIUM 8.7 09/03/2021     (L) 09/03/2021    K 3.8 09/03/2021    CO2 26.9 09/03/2021     09/03/2021    BUN 10 09/03/2021    CREATININE 0.42 (L) 09/03/2021    EGFRIFNONA 140 09/03/2021    BCR 23.8 09/03/2021    ANIONGAP 8.1 09/03/2021     Lab Results   Component Value Date    WBC 6.58 09/02/2021    HGB 11.4 (L) 09/02/2021    HCT 34.1 09/02/2021    MCV 88.6 09/02/2021     (L) 09/02/2021      Results from last 7 days   Lab Units 09/02/21  1518   CHOLESTEROL mg/dL 137     Lab Results   Component Value Date    INR 1.10 08/27/2021    INR 1.0 01/08/2014    PROTIME 14.0 08/27/2021    PROTIME 11.1 01/08/2014     Lab Results   Component Value Date    TSH 2.750 01/31/2019     Lab Results   Component Value Date    HGBA1C 5.53 08/29/2021     Lab Results   Component Value Date    CHOL 137 09/02/2021    CHLPL 203 (H) 01/31/2019    TRIG 111 09/02/2021    HDL 50 09/02/2021    LDL 67 09/02/2021      Lab Results   Component Value Date    SRRPIXOH68 307 08/31/2021       Results Review:     I reviewed the patient's new clinical results.  I reviewed the patient's new imaging results and agree with the interpretation.   RUE Doppler 8/31/2022: Interpretation Summary  · Acute right upper extremity deep vein thrombosis noted in the brachial and ulnar.  · All other right sided vessels appear normal.      MRI brain, MRI C-spine:   FINDINGS:   There is a punctate focus of diffusion-weighted hyperintensity within  the posterior aspect of the left centrum semiovale which measures up to  approximately 2 mm in diameter. This finding is suspicious for a tiny  acute to subacute infarct which could be an embolic event or could be  representative of an in situ small vessel thrombotic event.  Severe confluent changes of chronic  small vessel ischemic phenomena are  identified. The ventricles, sulci, and cisterns are age appropriate.  There are no abnormal areas of susceptibility artifact. The major  intracranial flow-related signal voids are within normal limits. The  midline intracranial anatomy is unremarkable.  There is mucosal thickening within the right maxillary sinus and the  ethmoid air cells. An air-fluid level is identified within the right  maxillary sinus.  IMPRESSION:   Within the posterior aspect of the left centrum semiovale, there is a 2  mm focus of diffusion-weighted hyperintensity which is suspicious for an  acute to subacute infarct and this could either be an embolic event or  an in situ small vessel thrombotic event.  Severe confluent changes of chronic small vessel ischemic phenomena are  identified.  MRI OF THE CERVICAL SPINE:  TECHNIQUE: MRI of the cervical spine was obtained with sagittal T1,  gradient echo, and T2-weighted images. Additionally, there are axial  gradient echo, axial T2, and oblique sagittal T2-weighted images through  the cervical spine.  FINDINGS:   The study is compromised by marked motion artifact.  At C2-3, there is no significant degree of canal or foraminal stenosis.  At C3-4, there is moderate right foraminal narrowing secondary to a  combination of uncovertebral joint hypertrophy and facet arthrosis.  There is no significant degree of left foraminal narrowing. A disc  osteophyte complex results in a mild degree of canal stenosis.  At C4-5, there is mild left foraminal narrowing secondary to facet  hypertrophic change. There is no significant degree of right foraminal  narrowing. No significant canal stenosis is identified.  At C5-6, there is a disc osteophyte complex which results in a mild  degree of canal narrowing. There is moderate left facet hypertrophic  change. However, mild left foraminal narrowing is seen.   At C6-7, again, a disc osteophyte complex results in a mild degree  of  canal narrowing. There is no significant degree of foraminal stenosis.  At C7-T1, there is no significant canal or foraminal narrowing.  The cervical spinal cord demonstrates no convincing signal abnormality.  There are no abnormal areas of susceptibility artifact. The visualized  contents of the cranial vault are unremarkable.  Incidental note is made of bilateral shoulder effusions.  IMPRESSION: The current exam is compromised by marked motion artifact.  Only relatively mild degrees of multilevel canal narrowing are noted.  At the C3-4 level, there is a moderate degree of right foraminal  stenosis secondary to a combination of uncovertebral joint hypertrophy  and facet arthrosis. This is the level of the most prominent foraminal  compromise.  The remaining multilevel degenerative phenomena within the cervical  spine are as discussed in detail above.  Apparently, there is a history of falls. If there is any concern for a  cervical spine fracture, I would strongly suggest further evaluation  with a CT scan of the cervical spine.   Incidental note is made of bilateral shoulder effusion          TTE 8/27/2021: Interpretation Summary  · Left ventricular wall thickness is consistent with mild concentric hypertrophy.  · Mildly reduced right ventricular systolic function noted.  · Mild pulmonary hypertension is present.  · Saline test results are positive for right to left atrial level shunt.  · Mild aortic valve stenosis is present.  · Aortic valve mean pressure gradient is 10 mmHg.  · Estimated left ventricular EF = 60% Left ventricular systolic function is normal.  · Left ventricular diastolic function is consistent with (grade Ia w/high LAP) impaired relaxation.     Impression: Ms. Stephens is 89 yo female with reported h/o dementia who presented on 8/27 following a fall, underwent orthopedic surgery for right hip, femur fracture.  Neurology was consulted for persistent right hand weakness and associated edema.       She was subsequently found to have a right upper extremity DVT and MRI showing incidental left hemispheric infarct. Here TTE showed LVEF 60% and saline tests positive for right to left shunt. Her right leg is also swollen and may potentially have a RLE DVT as well. She is already being treated therapeutically with Eliquis, and ASA 81 mg has been added. There is a carotid US pending.     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 Her lovenox has been transitioned to Eliquis 2.5 mg BID and ASA was added. Her infarct is an incidental finding but possibly paradoxical embolism given DVT and PFO. Neurologic exam is stable. Recommend continuing current medication regimen, PT/OT/ST.     Plan:  Carotid US pending -- will follow results.   Aspirin 81 mg  Eliquis for DVT treatment  Crestor 20 mg  Hydrate  Neurochecks  EKG Tele  PT/OT/ST  Stroke Education  F/U in neurology office in about 3 months  We will see again inpatient on request, please call with questions or concerns.    I have discussed the above with patient, RN, and Dr. Martínez who agrees with plan.   Elvira Conroy, APRN  09/03/21  13:48 EDT        Fall as cause of accidental injury at home as place of occurrence    Closed fracture of right hip (CMS/HCC)    Traumatic rhabdomyolysis (CMS/HCC)    Acute hyperglycemia    Alzheimer's dementia (CMS/HCC)    Acute kidney failure (CMS/HCC)    Anemia    Vitamin D deficiency    Weakness of right upper extremity    Acute retention of urine    Hydronephrosis    Acute deep vein thrombosis (DVT) of brachial vein of right upper extremity (CMS/HCC)    Recent cerebrovascular accident (CVA)

## 2021-09-03 NOTE — PROGRESS NOTES
Orthopedic Progress Note      Patient: Marcella Stephens    YOB: 1923    Medical Record Number: [id number]    Attending Physician: Grace Girard MD    Date of Admission: 8/27/2021  4:17 PM    Admitting Dx:  Closed fracture of right hip, initial encounter (CMS/HCC) [S72.001A]  Traumatic rhabdomyolysis, initial encounter (CMS/HCC) [T79.6XXA]    Status Post: Cephalomedullary fixation of right intertrochanteric femur fracture    Post Operative Day Number: 6    Current Problem List:   Patient Active Problem List   Diagnosis    Closed fracture of right hip (CMS/[facility])    Traumatic rhabdomyolysis (CMS/[facility])    Fall as cause of accidental injury at home as place of occurrence    Acute hyperglycemia    Alzheimer's dementia (CMS/[facility])    Acute kidney failure (CMS/[facility])    Anemia    Vitamin D deficiency    Weakness of right upper extremity    Acute retention of urine    Hydronephrosis    Acute deep vein thrombosis (DVT) of brachial vein of right upper extremity (CMS/[facility])         Past Medical History:   Diagnosis Date    Prediabetes        Current Medications:  Scheduled Meds:aspirin, 81 mg, Oral, Daily  docusate sodium, 100 mg, Oral, BID  enoxaparin, 1 mg/kg, Subcutaneous, Q24H  famotidine, 20 mg, Oral, Daily  gadobenate dimeglumine, 10 mL, Intravenous, Once in imaging  polyethylene glycol, 17 g, Oral, Daily  rosuvastatin, 20 mg, Oral, Nightly  sodium chloride, 3 mL, Intravenous, Q12H  vitamin D, 50,000 Units, Oral, Q7 Days      PRN Meds:.  acetaminophen    acetaminophen    HYDROcodone-acetaminophen    HYDROmorphone **AND** naloxone    melatonin    ondansetron **OR** ondansetron    Pharmacy to Dose enoxaparin (LOVENOX)    [COMPLETED] Insert peripheral IV **AND** sodium chloride    SUBJECTIVE: 89 y.o.  female    OBJECTIVE:   Vitals:    09/02/21 1817 09/02/21 2308 09/03/21 0729 09/03/21 0938   BP: 107/60 106/69 124/75 110/56   BP Location: Left arm Left arm Left arm    Patient Position: Lying Lying Lying   "  Pulse: 80 71 82 88   Resp: 16 16 16    Temp: 97.1 °F (36.2 °C) 97.1 °F (36.2 °C) 97.3 °F (36.3 °C)    TempSrc: Skin Skin Skin    SpO2: 96% 98% 95%    Weight:    52.2 kg (115 lb)   Height:    157.5 cm (62\")     I/O last 3 completed shifts:  In: 1160 [P.O.:860; Other:300]  Out: 1600 [Urine:1600]    Diagnostic Tests:   Lab Results (last 24 hours)       Procedure Component Value Units Date/Time    Lipid Panel [[id number]] Collected: 09/02/21 1518    Specimen: Blood Updated: 09/02/21 1627     Total Cholesterol 137 mg/dL      Triglycerides 111 mg/dL      HDL Cholesterol 50 mg/dL      LDL Cholesterol  67 mg/dL      VLDL Cholesterol 20 mg/dL      LDL/HDL Ratio 1.30    Narrative:      Cholesterol Reference Ranges  (U.S. Department of Health and Human Services ATP III Classifications)    Desirable          <200 mg/dL  Borderline High    200-239 mg/dL  High Risk          >240 mg/dL      Triglyceride Reference Ranges  (U.S. Department of Health and Human Services ATP III Classifications)    Normal           <150 mg/dL  Borderline High  150-199 mg/dL  High             200-499 mg/dL  Very High        >500 mg/dL    HDL Reference Ranges  (U.S. Department of Health and Human Services ATP III Classifcations)    Low     <40 mg/dl (major risk factor for CHD)  High    >60 mg/dl ('negative' risk factor for CHD)        LDL Reference Ranges  (U.S. Department of Health and Human Services ATP III Classifcations)    Optimal          <100 mg/dL  Near Optimal     100-129 mg/dL  Borderline High  130-159 mg/dL  High             160-189 mg/dL  Very High        >189 mg/dL    CBC & Differential [[id number]]  (Abnormal) Collected: 09/02/21 1518    Specimen: Blood Updated: 09/02/21 1625    Narrative:      The following orders were created for panel order CBC & Differential.  Procedure                               Abnormality         Status                     ---------                               -----------         ------                     CBC " Auto Differential[[id number]]        Abnormal            Final result                 Please view results for these tests on the individual orders.    CBC Auto Differential [[id number]]  (Abnormal) Collected: 09/02/21 1518    Specimen: Blood Updated: 09/02/21 1625     WBC 6.58 10*3/mm3      RBC 3.85 10*6/mm3      Hemoglobin 11.4 g/dL      Hematocrit 34.1 %      MCV 88.6 fL      MCH 29.6 pg      MCHC 33.4 g/dL      RDW 13.9 %      RDW-SD 44.3 fl      MPV 9.9 fL      Platelets 135 10*3/mm3      Neutrophil % 71.5 %      Lymphocyte % 14.9 %      Monocyte % 9.4 %      Eosinophil % 2.6 %      Basophil % 0.5 %      Immature Grans % 1.1 %      Neutrophils, Absolute 4.71 10*3/mm3      Lymphocytes, Absolute 0.98 10*3/mm3      Monocytes, Absolute 0.62 10*3/mm3      Eosinophils, Absolute 0.17 10*3/mm3      Basophils, Absolute 0.03 10*3/mm3      Immature Grans, Absolute 0.07 10*3/mm3      nRBC 0.0 /100 WBC             PHYSICAL EXAM: Came by to see the patient.  She was down for Transthoracic Echo.   Son at bedside.  Nurse reports that she is more awake today, but confused.  Continues with weakness and pain in RUE.  She is not progressing with therapy.  She is max assist of 2 to sit up and transfer.  She resists sitting and arches back.  HG 11.4.      MRI brain did show incidental acute vs subacute infarct that neurology states does not correlate with her symptoms.  MRI Cervical spine shows multilevel Degenerative changes also shows C3-4 foraminal stenosis which most likely is not the cause of her right arm weakness.  No concerns for cervical fracture.  RI has been reviewed by both Dr. Mosqueda and Dr. Scruggs.  Will continue to monitor the patient.     ASSESSMENT & PLAN:    Will need to continue to try to push her with physical therapy.  Patient will need to go to a skilled nursing facility for rehab post discharge from the hospital      Date: 9/3/2021    Heavenly Baxter RN      Medicine has ordered US to r/o leg dvt.  Questionable swelling may be due to SCD and forming hematoma due to fracture and disuse.    I have reviewed and agree with the above.    Louis Scruggs MD

## 2021-09-03 NOTE — PLAN OF CARE
Goal Outcome Evaluation:           Progress: declining  Outcome Summary: 89 y/o s/POD 6 R hip IM nailing/rodding. Pt AO x3 this shift, but becomes confused often, requiring reorientation. Pt up w/ assist x2 w/ PT. Pt turned q2 hrs and orange cream applied to buttocks. Voiding per FC. C/O pain upon plantar flexion of R foot. Doppler ordered, acute DVT to BLE seen, attending/ortho made aware, see orders. RUE and RLE swollen, elevated w/ pillows. 1+ R pedis pulse noted. Dried drainage noted on dsg, otherwise intact. Pain managed w/ PO pills. Lidocaine patch applied to neck. PIV saline locked. RA. Educated on skin management, requires reinforcement. Plan to d/c to [facility] once medically cleared. Will CTM.

## 2021-09-03 NOTE — PROGRESS NOTES
Name: Marcella Stephens ADMIT: 2021   : 1923  PCP: Noemi Chester MD    MRN: [id number] LOS: 7 days   AGE/SEX: 89 y.o. female  ROOM: [facility]     Subjective   Subjective   Patient much more awake and alert today and can provide history.  She is complaining of swelling and pain and weakness of the right upper extremity and right lower extremity.  She has denied any fever or chills.  No chest pain/no palpitations/no shortness of breath.  Improved p.o. intake.  No abdominal pain or nausea or vomiting.  Clear urine in the Dupree catheter.         Objective   Objective   Vital Signs  Temp:  [97.1 °F (36.2 °C)-97.3 °F (36.3 °C)] 97.3 °F (36.3 °C)  Heart Rate:  [71-88] 88  Resp:  [16] 16  BP: (106-135)/(56-83) 110/56  SpO2:  [95 %-98 %] 95 %  on   ;   Device (Oxygen Therapy): room air    Intake/Output Summary (Last 24 hours) at 9/3/2021 1215  Last data filed at 9/3/2021 1155  Gross per 24 hour   Intake 530 ml   Output 750 ml   Net -220 ml     Body mass index is 21.03 kg/m².      21  1629 21  0938   Weight: 52.2 kg (115 lb) 52.2 kg (115 lb)     Physical Exam    General.  Elderly female.  Patient is alert.  Oriented x2..  No apparent pain distress or diaphoresis.    Friable.    Eyes.  Pupils equal round and reactive.  No pallor or jaundice.  Normal conjunctive and lids.  Positive bilateral arcus analysis and status post bilateral cataract surgery.    Oral cavity.  Moist mucous membranes.  Neck.  No JVD.  Supple.  No lymphadenopathy or thyromegaly.  Cardiovascular.  Regular rate and rhythm.  Grade 2 systolic murmur.  Chest.  Clear to auscultation bilaterally with no added sounds.  Abdomen.  Soft lax.  No tenderness.  No organomegaly.  No guarding or rebound.  Extremities.  No clubbing cyanosis   Dressed right hip wound with mild bloody discharge on the gauze.  +1 to +2 right upper extremity and lower extremity edema.  There is pain of the right wrist and right hand.  CNS.  There is a right  upper/lower extremity weakness.  Otherwise no focal deficits could be appreciated.    Results Review:      Results from last 7 days   Lab Units 09/01/21  0341 08/31/21  0503 08/30/21  0722 08/29/21  0922 08/28/21  0706 08/27/21  1636   SODIUM mmol/L 137 134* 131* 131* 139 139   POTASSIUM mmol/L 3.9 4.3 4.6 4.3 4.3 4.4   CHLORIDE mmol/L 105 104 99 98 105 101   CO2 mmol/L 23.9 20.7* 21.2* 21.8* 24.2 26.5   BUN mg/dL 24* 46* 63* 50* 44* 37*   CREATININE mg/dL 0.36* 0.94 1.83* 1.08* 0.69 0.73   GLUCOSE mg/dL 95 83 106* 196* 98 117*   CALCIUM mg/dL 8.3 8.3 8.4 8.3 8.8 9.2   AST (SGOT) U/L  --   --   --   --   --  38*   ALT (SGPT) U/L  --   --   --   --   --  20     Estimated Creatinine Clearance: 33.1 mL/min (A) (by C-G formula based on SCr of 0.36 mg/dL (L)).  Results from last 7 days   Lab Units 08/29/21  0922   HEMOGLOBIN A1C % 5.53         Results from last 7 days   Lab Units 09/01/21  0341 08/31/21  0503 08/29/21  0922 08/28/21  1628 08/28/21  0706 08/27/21  1636   CK TOTAL U/L 159 201* 383* 654* 851* 1,273*             Results from last 7 days   Lab Units 08/29/21  0922   MAGNESIUM mg/dL 2.3     Results from last 7 days   Lab Units 09/02/21  1518   CHOLESTEROL mg/dL 137   TRIGLYCERIDES mg/dL 111   HDL CHOL mg/dL 50     Results from last 7 days   Lab Units 09/02/21  1518 09/01/21  0341 08/31/21  0503 08/31/21  0503 08/30/21  0722 08/29/21  0922 08/29/21  0922 08/28/21  0706 08/28/21  0706 08/27/21  1636 08/27/21  1636   WBC 10*3/mm3 6.58 7.35  --  7.61 8.53  --  8.60  --  7.46  --  9.05   HEMOGLOBIN g/dL 11.4* 10.6*  --  10.6* 7.8*  --  8.6*  --  10.3*  --  10.5*   HEMATOCRIT % 34.1 32.2*  --  33.8* 24.4*  --  26.5*  --  31.1*  --  31.9*   PLATELETS 10*3/mm3 135* 108*  --  84* 107*  --  155  --  148  --  159   MCV fL 88.6 88.5   < > 91.6 90.0   < > 90.1   < > 88.9   < > 87.9   MCH pg 29.6 29.1   < > 28.7 28.8   < > 29.3   < > 29.4   < > 28.9   MCHC g/dL 33.4 32.9   < > 31.4* 32.0   < > 32.5   < > 33.1   < > 32.9    RDW % 13.9 13.7   < > 14.3 14.3   < > 14.3   < > 14.3   < > 14.1   RDW-SD fl 44.3 44.4   < > 48.2 46.6   < > 46.9   < > 46.1   < > 44.3   MPV fL 9.9 10.5   < > 11.4 10.9   < > 11.2   < > 10.6   < > 10.8   NEUTROPHIL % % 71.5 67.3   < > 73.9 73.9   < > 79.2*  --   --    < > 80.2*   LYMPHOCYTE % % 14.9* 19.5*   < > 15.9* 16.1*   < > 14.2*  --   --    < > 10.7*   MONOCYTES % % 9.4 8.2   < > 6.4 8.7   < > 5.9  --   --    < > 8.4   EOSINOPHIL % % 2.6 3.7   < > 2.8 0.5   < > 0.1*  --   --    < > 0.0*   BASOPHIL % % 0.5 0.3   < > 0.3 0.1   < > 0.1  --   --    < > 0.1   IMM GRAN % % 1.1*  --    < > 0.7*  --   --   --   --   --    < > 0.6*   NEUTROS ABS 10*3/mm3 4.71 4.96   < > 5.63 6.31   < > 6.81  --   --    < > 7.26*   LYMPHS ABS 10*3/mm3 0.98 1.43   < > 1.21 1.37   < > 1.22  --   --    < > 0.97   MONOS ABS 10*3/mm3 0.62 0.60   < > 0.49 0.74   < > 0.51  --   --    < > 0.76   EOS ABS 10*3/mm3 0.17 0.27   < > 0.21 0.04   < > 0.01  --   --    < > 0.00   BASOS ABS 10*3/mm3 0.03 0.02   < > 0.02 0.01   < > 0.01  --   --    < > 0.01   IMMATURE GRANS (ABS) 10*3/mm3 0.07*  --    < > 0.05  --   --   --   --   --    < > 0.05   NRBC /100 WBC 0.0  --    < > 0.0  --   --   --   --   --    < > 0.0    < > = values in this interval not displayed.     Results from last 7 days   Lab Units 08/27/21  1759   INR  1.10                                       Imaging:  Imaging Results (Last 24 Hours)     ** No results found for the last 24 hours. **             I reviewed the patient's new clinical results / labs / tests / procedures      Assessment/Plan     Active Hospital Problems    Diagnosis  POA   • **Fall as cause of accidental injury at home as place of occurrence [W19.XXXA, Y92.009]  Not Applicable   • Recent cerebrovascular accident (CVA) [Z86.73]  Clinically Undetermined   • Acute deep vein thrombosis (DVT) of brachial vein of right upper extremity (CMS/HCC) [I82.621]  Yes   • Weakness of right upper extremity [R29.898]  Yes   • Acute  retention of urine [R33.8]  Yes   • Hydronephrosis [N13.30]  Yes   • Acute kidney failure (CMS/HCC) [N17.9]  Yes   • Anemia [D64.9]  Yes   • Vitamin D deficiency [E55.9]  Yes   • Acute hyperglycemia [R73.9]  Yes   • Alzheimer's dementia (CMS/HCC) [G30.9, F02.80]  Yes   • Traumatic rhabdomyolysis (CMS/[facility]) [T79.6XXA]  Yes   • Closed fracture of right hip (CMS/[facility]) [S72.001A]  Yes      Resolved Hospital Problems   No resolved problems to display.           · Status post fall leading to right hip fracture s/p open reduction and internal fixation.  Currently on aspirin/anticoagulation.  Currently on PT/OT with slow progress.will be eventually discharged to a skilled facility.  · Traumatic rhabdomyolysis.  Resolved.  Normal CK.  IV fluid DC'd.    · Acute kidney failure/acute urinary retention/right hydronephrosis.  Patient was volume depleted.  On no nephrotoxic agents.  Good urine output.  Acute kidney failure mostly secondary to hypovolemia/rhabdomyolysis/hypotension/hydronephrosis   Patient appears euvolemic now.  Kidney function back to normal.  IV fluid DC'd.  Renal ultrasound with distended bladder and mild right hydronephrosis.  Dupree catheter in place.  Urology recommend continuation of the Dupree catheter and tells ambulatory and then voiding trials.  · Chronic disease anemia/thrombocytopenia..  Now a component of postoperative anemia.  Baseline hemoglobin is around 10.5.    Status post transfusion with appropriate hemoglobin response.  Hemoglobin stable.  Hemoccult positive stool but will defer GI work-up secondary to the patient's age.  No active bleeding.  Platelets are stable and improving..  Closely monitor CBC.  · Vitamin D deficiency.  Supplementation started by Ortho.  · Glucose intolerance.  A1c 5.3.  · Dementia.   · Right upper/lower extremity weakness/swelling.  Neurology saw the patient.  MRI of the brain shows acute/subacute left infarction and degenerative disc disease of the C-spine with CT 3 C4  right foraminal narrowing.  Both could be contributing to the above together with the left DVT of the upper extremity.  Echo with normal ejection fraction/diastolic dysfunction/mild AS/positive saline test.  Neurology added aspirin and signed off..  Will check carotid ultrasound and will most likely need a Zio patch at discharge.  Patient currently on anticoagulation.  · Acute right upper extremity DVT.  On anticoagulation with Lovenox.  Will switch to low-dose Eliquis.    · Swallow disorder.  Speech saw the patient.  Diet advanced to soft mechanical and thin liquids.      Discussed with patient/daughter/discharge planner..  Disposition.  Going back to skilled facility.  Probably in 1 to 2 days.  Patient has a bed available.    Grace Girard MD  [facility]  09/03/21  12:15 EDT        Review of Systems

## 2021-09-03 NOTE — THERAPY TREATMENT NOTE
Patient Name: Marcella Stephens  : 1923    MRN: [id number]                              Today's Date: 9/3/2021       Admit Date: 2021    Visit Dx:     ICD-10-CM ICD-9-CM   1. Closed fracture of right hip, initial encounter (CMS/[facility])  S72.001A 820.8   2. Traumatic rhabdomyolysis, initial encounter (CMS/[facility])  T79.6XXA 958.6     Patient Active Problem List   Diagnosis   • Closed fracture of right hip (CMS/[facility])   • Traumatic rhabdomyolysis (CMS/[facility])   • Fall as cause of accidental injury at home as place of occurrence   • Acute hyperglycemia   • Alzheimer's dementia (CMS/[facility])   • Acute kidney failure (CMS/[facility])   • Anemia   • Vitamin D deficiency   • Weakness of right upper extremity   • Acute retention of urine   • Hydronephrosis   • Acute deep vein thrombosis (DVT) of brachial vein of right upper extremity (CMS/[facility])   • Recent cerebrovascular accident (CVA)     Past Medical History:   Diagnosis Date   • Prediabetes      Past Surgical History:   Procedure Laterality Date   • BACK SURGERY     • FEMUR IM NAILING/RODDING Right 2021    Procedure: FEMUR INTRAMEDULLARY NAILING/RODDING;  Surgeon: Louis Scruggs MD;  Location: [facility];  Service: Orthopedics;  Laterality: Right;   • KNEE SURGERY       General Information     Row Name 21 1625          Physical Therapy Time and Intention    Document Type  therapy note (daily note)  -MS     Mode of Treatment  physical therapy;individual therapy  -MS     Row Name 21 1625          General Information    Patient Profile Reviewed  yes  -MS     Existing Precautions/Restrictions  (S) fall Exit alarm  -MS     Row Name 21 1625          Cognition    Orientation Status (Cognition)  oriented to;person  -MS     Row Name 21 1625          Safety Issues, Functional Mobility    Comment, Safety Issues/Impairments (Mobility)  Gait belt used for safety.  -MS       User Key  (r) = Recorded By, (t) = Taken By, (c) = Cosigned By    Initials Name  Provider Type    Tobi Feng, PT Physical Therapist        Mobility     Row Name 09/03/21 1626          Bed Mobility    Supine-Sit [place] (Bed Mobility)  maximum assist (25% patient effort);2 person assist  -MS     Sit-Supine [place] (Bed Mobility)  maximum assist (25% patient effort);2 person assist  -MS     Assistive Device (Bed Mobility)  draw sheet  -MS     Row Name 09/03/21 1626          Transfers    Comment (Transfers)  Pt. performed sit <-> stand transfers x 3 reps for functional strength training (Bilateral feet blocked for safety). Pt. only able to stand ~ 10 seconds with each stance due to weakness/fatigue.  -MS     Row Name 09/03/21 1626          Sit-Stand Transfer    Sit-Stand [place] (Transfers)  maximum assist (25% patient effort);2 person assist  -MS     Assistive Device (Sit-Stand Transfers)  -- HHA x 1 (LUE) and use of gait belt; Pt. unable to use RUE for HHA or RWX due to swelling and pain.  -MS     Row Name 09/03/21 1626          Mobility    Right Lower Extremity (Weight-bearing Status)  weight-bearing as tolerated (WBAT)  -MS       User Key  (r) = Recorded By, (t) = Taken By, (c) = Cosigned By    Initials Name Provider Type    Tobi Feng PT Physical Therapist        Obj/Interventions     [facility] Name 09/03/21 1627          Motor Skills    Therapeutic Exercise  -- Right Hip ORIF ther. ex. program x 10 reps completed with assist.  -MS       User Key  (r) = Recorded By, (t) = Taken By, (c) = Cosigned By    Initials Name Provider Type    Tobi Feng, PT Physical Therapist        Goals/Plan    No documentation.       Clinical Impression     Row Name 09/03/21 1627          Pain    Additional Documentation  Pain Scale: Numbers Pre/Post-Treatment (Group)  -MS     [facility] Name 09/03/21 1627          Pain Scale: Numbers Pre/Post-Treatment    Pretreatment Pain Rating  5/10  -MS     Posttreatment Pain Rating  3/10  -MS     Pain Location - Side  Right  -MS     Pain  Location  hip;hand  -MS     Pain Intervention(s)  Medication (See MAR);Elevated;Nursing Notified;Repositioned;Cold applied  -MS     Row Name 09/03/21 1627          Positioning and Restraints    Pre-Treatment Position  in bed  -MS     Post Treatment Position  bed  -MS     In Bed  notified nsg;supine;call light within reach;encouraged to call for assist;exit alarm on;RLE elevated;RUE elevated;LUE elevated;LLE elevated;heels elevated;with nsg  -MS       User Key  (r) = Recorded By, (t) = Taken By, (c) = Cosigned By    Initials Name Provider Type    MS BeauchampTobi, PT Physical Therapist        Outcome Measures     Row Name 09/03/21 1628          How much help from another person do you currently need...    Turning from your back to your side while in flat bed without using bedrails?  2  -MS     Moving from lying on back to sitting on the side of a flat bed without bedrails?  2  -MS     Moving to and from a bed to a chair (including a wheelchair)?  2  -MS     Standing up from a chair using your arms (e.g., wheelchair, bedside chair)?  2  -MS     Climbing 3-5 steps with a railing?  1  -MS     To walk in hospital room?  1  -MS     AM-PAC 6 Clicks Score (PT)  10  -MS       User Key  (r) = Recorded By, (t) = Taken By, (c) = Cosigned By    Initials Name Provider Type    MS BeauchampTobi, PT Physical Therapist                       Physical Therapy Education                 Title: PT OT SLP Therapies (In Progress)     Topic: Physical Therapy (In Progress)     Point: Mobility training (In Progress)     Learning Progress Summary           Patient Acceptance, E,D, NR by MS at 9/3/2021 1628    Nonacceptance, E,D, NR by MS at 9/2/2021 1525    Acceptance, E,TB,D, NR by KB at 9/2/2021 0357    Acceptance, E,D, NR by MS at 9/1/2021 1527    Acceptance, E,TB, NL,NR by CS at 8/31/2021 1441    Acceptance, E,D, VU,NR by MS at 8/30/2021 1250    Acceptance, E, NR by DJ at 8/29/2021 1106   Family Acceptance, E, NR by DJ at 8/29/2021  1106                   Point: Home exercise program (In Progress)     Learning Progress Summary           Patient Acceptance, E,D, NR by MS at 9/3/2021 1628    Nonacceptance, E,D, NR by MS at 9/2/2021 1525    Acceptance, E,TB,D, NR by KB at 9/2/2021 0357    Acceptance, E,D, NR by MS at 9/1/2021 1527    Acceptance, E,TB, NL,NR by CS at 8/31/2021 1441    Acceptance, E,D, VU,NR by MS at 8/30/2021 1250    Acceptance, E, NR by DJ at 8/29/2021 1106   Family Acceptance, E, NR by DJ at 8/29/2021 1106                   Point: Body mechanics (In Progress)     Learning Progress Summary           Patient Acceptance, E,D, NR by MS at 9/3/2021 1628    Nonacceptance, E,D, NR by MS at 9/2/2021 1525    Acceptance, E,TB,D, NR by KB at 9/2/2021 0357    Acceptance, E,D, NR by MS at 9/1/2021 1527    Acceptance, E,TB, NL,NR by CS at 8/31/2021 1441    Acceptance, E,D, VU,NR by MS at 8/30/2021 1250    Acceptance, E, NR by DJ at 8/29/2021 1106   Family Acceptance, E, NR by DJ at 8/29/2021 1106                   Point: Precautions (In Progress)     Learning Progress Summary           Patient Acceptance, E,D, NR by MS at 9/3/2021 1628    Nonacceptance, E,D, NR by MS at 9/2/2021 1525    Acceptance, E,TB,D, NR by KB at 9/2/2021 0357    Acceptance, E,D, NR by MS at 9/1/2021 1527    Acceptance, E,TB, NL,NR by CS at 8/31/2021 1441    Acceptance, E,D, VU,NR by MS at 8/30/2021 1250    Acceptance, E, NR by DJ at 8/29/2021 1106   Family Acceptance, E, NR by DJ at 8/29/2021 1106                               User Key     Initials Effective Dates Name Provider Type Discipline    JUANA 06/16/21 -  Bre Cevallos, RN Registered Nurse Nurse    MS 06/16/21 -  Tobi Beauchamp, PT Physical Therapist PT    DJ 10/25/19 -  Ella Maria PT Physical Therapist PT    CS 02/02/21 -  Fe Fish, RN Registered Nurse Nurse              PT Recommendation and Plan     Plan of Care Reviewed With: patient  Outcome Summary: Pt. requires Max. assist x 2 for bed  mobility and Max. assist x 2 for sit <-> stand transfers.  Pt. performed sit <-> stand transfers x 3 reps for functional strength training (Bilateral feet blocked for safety).  Pt. only able to maintain a standing position for ~ 10 seconds with each stance due to fatigue, weakness, and pain. Verbal/tactile cues given throughout for posture correction as well.  Right Hip ORIF ther. ex. program x 10 reps completed with assist for general strengthening.     Time Calculation:   PT Charges     Row Name 09/03/21 1631             Time Calculation    Start Time  1410  -MS      Stop Time  1425  -MS      Time Calculation (min)  15 min  -MS      PT Received On  09/03/21  -MS      PT - Next Appointment  09/04/21  -MS         Time Calculation- PT    Total Timed Code Minutes- PT  14 minute(s)  -MS        User Key  (r) = Recorded By, (t) = Taken By, (c) = Cosigned By    Initials Name Provider Type    Tobi Feng, PT Physical Therapist        Therapy Charges for Today     Code Description Service Date Service Provider Modifiers Qty    95450542611 HC PT THER PROC EA 15 MIN 9/2/2021 Tobi Beauchamp, PT GP 1    13987424149 HC PT THER SUPP EA 15 MIN 9/2/2021 Tobi Beauchamp, PT GP 1    58155866201 HC PT THER PROC EA 15 MIN 9/3/2021 Tobi Beauchamp, PT GP 1    95864523689 HC PT THER SUPP EA 15 MIN 9/3/2021 Tobi Beauchamp, PT GP 1          PT G-Codes  Outcome Measure Options: AM-PAC 6 Clicks Basic Mobility (PT)  AM-PAC 6 Clicks Score (PT): 10  AM-PAC 6 Clicks Score (OT): 10    Tobi Beauchamp PT  9/3/2021

## 2021-09-03 NOTE — THERAPY TREATMENT NOTE
Acute Care - Speech Language Pathology   Swallow Treatment Note [facility]     Patient Name: Marcella Stephens  : 1923  MRN: [id number]  Today's Date: 9/3/2021               Admit Date: 2021    Visit Dx:     ICD-10-CM ICD-9-CM   1. Closed fracture of right hip, initial encounter (CMS/[facility])  S72.001A 820.8   2. Traumatic rhabdomyolysis, initial encounter (CMS/[facility])  T79.6XXA 958.6     Patient Active Problem List   Diagnosis   • Closed fracture of right hip (CMS/[facility])   • Traumatic rhabdomyolysis (CMS/[facility])   • Fall as cause of accidental injury at home as place of occurrence   • Acute hyperglycemia   • Alzheimer's dementia (CMS/[facility])   • Acute kidney failure (CMS/[facility])   • Anemia   • Vitamin D deficiency   • Weakness of right upper extremity   • Acute retention of urine   • Hydronephrosis   • Acute deep vein thrombosis (DVT) of brachial vein of right upper extremity (CMS/[facility])   • Recent cerebrovascular accident (CVA)     Past Medical History:   Diagnosis Date   • Prediabetes      Past Surgical History:   Procedure Laterality Date   • BACK SURGERY     • FEMUR IM NAILING/RODDING Right 2021    Procedure: FEMUR INTRAMEDULLARY NAILING/RODDING;  Surgeon: Louis Scruggs MD;  Location: [facility];  Service: Orthopedics;  Laterality: Right;   • KNEE SURGERY         SLP Recommendation and Plan                                                             Plan of Care Reviewed With: patient  Outcome Summary: Pt seen with meal and assisted with feeding. Pt had good appetite. Feel pt is safe on current diet of [facility] soft chopped and thin liquids. RN reported pt taking pills well in yogurt. Recommend continue with current diet. ST to follow.         SWALLOW EVALUATION (last 72 hours)      SLP Adult Swallow Evaluation     Row Name 21 1300 21 0900                Rehab Evaluation    Document Type  therapy note (daily note)  -AW  re-evaluation  -JPETRONA       Subjective Information  no complaints  -AW  no  complaints  -JJ       Patient Observations  alert;cooperative;agree to therapy  -AW  alert;cooperative;agree to therapy  -JJ       Patient/Family/Caregiver Comments/Observations  Pt's daughter present at beginning of session.  -AW  Son present for evaluation  -JJ       Care Plan Review  evaluation/treatment results reviewed;care plan/treatment goals reviewed;risks/benefits reviewed;current/potential barriers reviewed;patient/other agree to care plan  -AW  evaluation/treatment results reviewed;patient/other agree to care plan  -JJ       Care Plan Review, Other Participant(s)  daughter  -AW  son  -JJ       Patient Effort  good  -AW  good  -JJ       Symptoms Noted During/After Treatment  none  -AW  none  -JJ          General Information    Patient Profile Reviewed  yes  -AW  yes  -JJ       Pertinent History Of Current Problem  Pt admitted s/p fall with hip fracture s/p open reduction and internal fixation.  -AW  s/p hip fracture, rhabdomyolysis. yesterday the patient had a lot of difficulty clearing solids. She kept regurgitating the solids back up into her mouth but she was able to clear thins adequately and was switched to a clear liquid diet.  -JJ       Current Method of Nutrition  soft textures;chopped;thin liquids  -AW  clear liquids  -JJ       Precautions/Limitations, Vision  WFL;for purposes of eval  -AW  WFL;for purposes of eval  -JJ       Precautions/Limitations, Hearing  WFL;for purposes of eval  -AW  WFL;for purposes of eval  -JJ       Prior Level of Function-Communication  unknown  -AW  unknown  -JJ       Prior Level of Function-Swallowing  no diet consistency restrictions  -AW  no diet consistency restrictions  -JJ       Plans/Goals Discussed with  patient;family;agreed upon  -AW  patient and family;agreed upon  -JJ       Barriers to Rehab  none identified  -AW  none identified  -JJ       Patient's Goals for Discharge  --  patient did not state  -JJ       Family Goals for Discharge  --  family did not  state  -          Oral Motor Structure and Function    Dentition Assessment  --  natural, present and adequate  -       Secretion Management  --  WNL/[facility]  -       Mucosal Quality  --  moist, healthy  -       Volitional Swallow  --  [facility]  -       Volitional Cough  --  weak  -          Oral Musculature and Cranial Nerve Assessment    Oral Motor General Assessment  --  generalized oral motor weakness  -          General Eating/Swallowing Observations    Respiratory Support Currently in Use  --  nasal cannula  -       Eating/Swallowing Skills  --  self-fed;fed by SLP  -       Positioning During Eating  --  upright 90 degree;upright in bed  -       Utensils Used  --  spoon;cup;straw  -       Consistencies Trialed  --  regular textures;soft textures;whole;pureed;ice chips;thin liquids  -          Respiratory    Respiratory Status  --  [facility]  -          Clinical Swallow Eval    Oral Prep Phase  --  [facility]  -       Oral Transit  --  [facility]  -       Oral Residue  --  [facility]  -       Pharyngeal Phase  --  no overt signs/symptoms of pharyngeal impairment  -       Esophageal Phase  --  unremarkable  -       Clinical Swallow Evaluation Summary  --  Swallow re-evaluation completed on this date. The patient was observed with ice chips, thins via spoon/cup/straw, puree, mechanical soft, and regular. No s/s aspiration were noted and patient was able to adequately clear all trials.  Patient appears to be much improved frm yesterday.  No regurgitation noted with any consistency tested. Swallow appears to be age appropriate at this time.  -          Clinical Impression    SLP Swallowing Diagnosis  --  swallow [facility]  -       Functional Impact  --  risk of aspiration/pneumonia  -       Rehab Potential/Prognosis, Swallowing  --  good, to achieve stated therapy goals  -       Swallow Criteria for Skilled Therapeutic Interventions Met  --  demonstrates skilled criteria  -       Plan for Continued Treatment  (SLP)  --  SLP to follow for diet tolerance, instrumental evaluation as needed  -          Recommendations    Therapy Frequency (Swallow)  --  PRN  -       Predicted Duration Therapy Intervention (Days)  --  until discharge  -       SLP Diet Recommendation  --  soft textures;chopped;thin liquids  -       Recommended Diagnostics  --  other (see comments) VFSS or esphagram if patient has any difficulty with diet  -       Recommended Precautions and Strategies  --  upright posture during/after eating;small bites of food and sips of liquid  -       Oral Care Recommendations  --  Oral Care BID/PRN;Oral Care before breakfast, after meals and PRN  -       SLP Rec. for Method of Medication Administration  --  meds crushed;with pudding or applesauce  -       Monitor for Signs of Aspiration  --  yes;notify SLP if any concerns  -       Anticipated Discharge Disposition (SLP)  --  unknown  -          Oral Nutrition/Hydration Goal 1 (SLP)    Oral Nutrition/Hydration Goal 1, SLP  Pt seen with meal and assisted with feeding. Pt had good appetite. Feel pt is safe on current diet of [facility] soft chopped and thin liquids. RN reported pt taking pills well in yogurt. Recommend continue with current diet. ST to follow.  -AW  --         User Key  (r) = Recorded By, (t) = Taken By, (c) = Cosigned By    Initials Name Effective Dates    Laura Salmeron, MS CCC-SLP 06/16/21 -     Thelma Savage, MS [facility]-SLP 06/16/21 -           EDUCATION  The patient has been educated in the following areas:   Dysphagia (Swallowing Impairment) Oral Care/Hydration Modified Diet Instruction.     Patient was not wearing a face mask during this therapy encounter. Therapist used appropriate personal protective equipment including mask, eye protection and gloves.  Mask used was standard procedure mask. Appropriate PPE was worn during the entire therapy session. Hand hygiene was completed before and after therapy session. Patient is  not in enhanced droplet precautions.       SLP GOALS     Row Name 09/03/21 1300             Oral Nutrition/Hydration Goal 1 (SLP)    Oral Nutrition/Hydration Goal 1, SLP  Pt seen with meal and assisted with feeding. Pt had good appetite. Feel pt is safe on current diet of [facility] soft chopped and thin liquids. RN reported pt taking pills well in yogurt. Recommend continue with current diet. ST to follow.  -        User Key  (r) = Recorded By, (t) = Taken By, (c) = Cosigned By    Initials Name Provider Type    Thelma Savage MS CCC-SLP Speech and Language Pathologist           SLP Outcome Measures (last 72 hours)      SLP Outcome Measures     Row Name 09/01/21 1000             SLP Outcome Measures    Outcome Measure Used?  Adult NOMS  -         Adult FCM Scores    FCM Chosen  Swallowing  -      Swallowing FCM Score  6  -        User Key  (r) = Recorded By, (t) = Taken By, (c) = Cosigned By    Initials Name Effective Dates    Laura Romero, MS CCC-SLP 06/16/21 -            Time Calculation:   Time Calculation- SLP     Row Name 09/03/21 1312             Time Calculation- SLP    SLP Start Time  1130  -AW      SLP Received On  09/03/21  -        User Key  (r) = Recorded By, (t) = Taken By, (c) = Cosigned By    Initials Name Provider Type    Thelma Savage MS CCC-SLP Speech and Language Pathologist          Therapy Charges for Today     Code Description Service Date Service Provider Modifiers Qty    32732843726  ST TREATMENT SWALLOW 4 9/3/2021 Thelma Awad, MS CCC-SLP GN 1               Thelma Awad MS CCC-SLP  9/3/2021

## 2021-09-03 NOTE — PLAN OF CARE
Goal Outcome Evaluation:  Plan of Care Reviewed With: patient           Outcome Summary: Pt seen with meal and assisted with feeding. Pt had good appetite. Feel pt is safe on [facility] soft chopped and thin liquids. RN reported pt taking pills well in yogurt. Recommend continue with current diet. ST to follow.

## 2021-09-03 NOTE — PLAN OF CARE
Pt on BSC with nursing staff on OT arrival. Pt reports 10/10 pain in R hip and arm. Max A x2 to stand, dependent for hygiene and Max A x2 to pivot back to EOB. Posterior lean and hips sliding forward at EOB. Max A x2 to assist pt to supine. Max A x1 to roll for linen and brief management. With HOB elevated pt able to minimally participate in RUE exercises. Pt significantly weak in RUE. Pt unable to complete any functional hand to face tasks with extremity. Edema with pain to touch in mid/distal radius/ulna. SNF at d/c recommended.    Patient was wearing a face mask during this therapy encounter. Therapist used appropriate personal protective equipment including masks, goggles and gloves. Mask used was standard procedure mask. Appropriate PPE was worn during the entire therapy session. Hand hygiene was completed before and after therapy session. Patient is not in enhanced droplet precautions.

## 2021-09-03 NOTE — PLAN OF CARE
Goal Outcome Evaluation:  Plan of Care Reviewed With: patient           Outcome Summary: Pt. requires Max. assist x 2 for bed mobility and Max. assist x 2 for sit <-> stand transfers.  Pt. performed sit <-> stand transfers x 3 reps for functional strength training (Bilateral feet blocked for safety).  Pt. only able to maintain a standing position for ~ 10 seconds with each stance due to fatigue, weakness, and pain. Verbal/tactile cues given throughout for posture correction as well.  Right Hip ORIF ther. ex. program x 10 reps completed with assist for general strengthening.    Patient was wearing a face mask during this therapy encounter. Therapist used appropriate personal protective equipment including eye protection, mask, and gloves.  Mask used was standard procedure mask. Appropriate PPE was worn during the entire therapy session. Hand hygiene was completed before and after therapy session. Patient is not in enhanced droplet precautions.

## 2021-09-03 NOTE — TELEPHONE ENCOUNTER
Call returned to the nurse.  Patient has increased swelling in her operative leg.  There is an area that if hard when touched located lateral to her knee.  It is above where her SCD's began.  There is no erythema.  Could be hematoma, but with her history of RUE DVT and emblic stroke medicine has ordered Doppler RLE.

## 2021-09-03 NOTE — PLAN OF CARE
Goal Outcome Evaluation:  Plan of Care Reviewed With: patient        Progress: no change  Outcome Summary: VSS, remains confused, yells out at intervals. max assist of two with transfers. F/C, followed by urology. NEFTALI remains swollen d/t DVT. Chronic fx to right wrist. Meds crushed in [facility] at discharge

## 2021-09-03 NOTE — PLAN OF CARE
Goal Outcome Evaluation:              Outcome Summary: POD 5 R hip nailing/rodding, disoriented to time upon assessment but is forgetful and yells out for her son although he had already left, VSS, 2L, max assist to bsc this shift-pt does not move well at all, dressings intact, F/C, bm this shift, MRI brain & cervical spine this shift, RUE remains swollen and very weak d/t DVT, rehab at d/c, CTM

## 2021-09-03 NOTE — CASE MANAGEMENT/SOCIAL WORK
Continued Stay Note  [facility]     Patient Name: Marcella Stephens  MRN: [id number]  Today's Date: 9/3/2021    Admit Date: 8/27/2021    Discharge Plan     Row Name 09/03/21 1552       Plan    Plan  [facility] -- Accepted.    Patient/Family in Agreement with Plan  yes    Plan Comments  Spoke with [facility] who will have a SNF bed for this patient through the weekend. CCP is to call Julio Cesar who is on-call through the weekend. Patient will need an ambulance to transport at d/c. Ricki Lau RN/A Liaison who discuss with  and he is agreeable. No other needs identified at this time.        Discharge Codes    No documentation.       Expected Discharge Date and Time     Expected Discharge Date Expected Discharge Time    Sep 1, 2021             Loni Onofre RN

## 2021-09-03 NOTE — THERAPY TREATMENT NOTE
Patient Name: Marcella Stephens  : 1923    MRN: [id number]                              Today's Date: 9/3/2021       Admit Date: 2021    Visit Dx:     ICD-10-CM ICD-9-CM   1. Closed fracture of right hip, initial encounter (CMS/[facility])  S72.001A 820.8   2. Traumatic rhabdomyolysis, initial encounter (CMS/[facility])  T79.6XXA 958.6     Patient Active Problem List   Diagnosis   • Closed fracture of right hip (CMS/[facility])   • Traumatic rhabdomyolysis (CMS/[facility])   • Fall as cause of accidental injury at home as place of occurrence   • Acute hyperglycemia   • Alzheimer's dementia (CMS/[facility])   • Acute kidney failure (CMS/[facility])   • Anemia   • Vitamin D deficiency   • Weakness of right upper extremity   • Acute retention of urine   • Hydronephrosis   • Acute deep vein thrombosis (DVT) of brachial vein of right upper extremity (CMS/[facility])     Past Medical History:   Diagnosis Date   • Prediabetes      Past Surgical History:   Procedure Laterality Date   • BACK SURGERY     • FEMUR IM NAILING/RODDING Right 2021    Procedure: FEMUR INTRAMEDULLARY NAILING/RODDING;  Surgeon: Louis Scruggs MD;  Location: [facility];  Service: Orthopedics;  Laterality: Right;   • KNEE SURGERY       General Information     Row Name 21          OT Time and Intention    Document Type  therapy note (daily note)  -RB     Mode of Treatment  individual therapy;occupational therapy  -RB     Row Name 21          General Information    Patient Profile Reviewed  yes  -RB     Prior Level of Function  independent:;ADL's;transfer  -RB     Existing Precautions/Restrictions  fall  -RB     Row Name 21          Cognition    Orientation Status (Cognition)  oriented to;person;situation  -RB     Row Name 21          Safety Issues, Functional Mobility    Safety Issues Affecting Function (Mobility)  ability to follow commands;awareness of need for assistance;insight into  deficits/self-awareness;judgment;problem-solving;safety precaution awareness;safety precautions follow-through/compliance;sequencing abilities  -RB       User Key  (r) = Recorded By, (t) = Taken By, (c) = Cosigned By    Initials Name Provider Type    RB Maribel Madera OT Occupational Therapist          Mobility/ADL's     Row Name 09/03/21 0911          Bed Mobility    Bed Mobility  rolling left;rolling right;sit-supine  -RB     Rolling Left [place] (Bed Mobility)  maximum assist (25% patient effort);1 person assist  -RB     Rolling Right [place] (Bed Mobility)  maximum assist (25% patient effort);1 person assist  -RB     Scooting/Bridging [place] (Bed Mobility)  maximum assist (25% patient effort);2 person assist  -RB     Sit-Supine [place] (Bed Mobility)  maximum assist (25% patient effort);2 person assist  -RB     Bed Mobility, Safety Issues  cognitive deficits limit understanding;decreased use of arms for pushing/pulling;impaired trunk control for bed mobility;decreased use of legs for bridging/pushing  -RB     Assistive Device (Bed Mobility)  bed rails;draw sheet  -     Row Name 09/03/21 0911          Transfers    Transfers  toilet transfer  -RB     Sit-Stand [place] (Transfers)  maximum assist (25% patient effort);2 person assist;verbal cues;nonverbal cues (demo/gesture)  -     [place] Level (Toilet Transfer)  maximum assist (25% patient effort);2 person assist  -RB     Assistive Device (Toilet Transfer)  commode, 3-in-1  -     Row Name 09/03/21 0911          Toilet Transfer    Type (Toilet Transfer)  sit-stand;stand-sit  -     Row Name 09/03/21 0911          Functional Mobility    Functional Mobility- Ind. Level  unable to perform  -     Row Name 09/03/21 0911          Activities of Daily Living    BADL Assessment/Intervention  toileting  -     Row Name 09/03/21 0911          Mobility    Right Lower Extremity (Weight-bearing Status)  weight-bearing as tolerated  (WBAT)  -RB     Row Name 09/03/21 0911          Lower Body Dressing Assessment/Training    [place] Level (Lower Body Dressing)  lower body dressing skills;dependent (less than 25% patient effort)  -RB     Row Name 09/03/21 0911          Toileting Assessment/Training    [place] Level (Toileting)  toileting skills;dependent (less than 25% patient effort)  -RB     Position (Toileting)  supported sitting;supported standing;supine  -RB       User Key  (r) = Recorded By, (t) = Taken By, (c) = Cosigned By    Initials Name Provider Type    Maribel Hurley OT Occupational Therapist        Obj/Interventions     Row Name 09/03/21 0913          Balance    Comment, Balance  Posterior lean , Max A x2 standing balance  -RB       User Key  (r) = Recorded By, (t) = Taken By, (c) = Cosigned By    Initials Name Provider Type    Maribel Hurley OT Occupational Therapist        Goals/Plan    No documentation.       Clinical Impression     Row Name 09/03/21 0914          Pain Scale: Numbers Pre/Post-Treatment    Pretreatment Pain Rating  5/10  -RB     Posttreatment Pain Rating  5/10  -RB     Pain Location - Side  Right  -RB     Pain Location  knee;hip  -RB     Row Name 09/03/21 0914          Plan of Care Review    Plan of Care Reviewed With  patient  -RB     Progress  no change  -RB     Row Name 09/03/21 0914          Therapy Assessment/Plan (OT)    Rehab Potential (OT)  good, to achieve stated therapy goals  -RB     Criteria for Skilled Therapeutic Interventions Met (OT)  yes;skilled treatment is necessary  -RB     Therapy Frequency (OT)  5 times/wk  -RB     Row Name 09/03/21 0914          Vital Signs    O2 Delivery Pre Treatment  room air  -RB     O2 Delivery Intra Treatment  room air  -RB     O2 Delivery Post Treatment  room air  -RB     Pre Patient Position  Sitting  -RB     Intra Patient Position  Standing  -RB     Post Patient Position  Supine  -RB     Row Name 09/03/21 0914          Positioning and Restraints     Pre-Treatment Position  bedside commode  -RB     Post Treatment Position  bed  -RB     In Bed  notified nsg;supine;fowlers;call light within reach;encouraged to call for assist;exit alarm on  -RB       User Key  (r) = Recorded By, (t) = Taken By, (c) = Cosigned By    Initials Name Provider Type    Maribel Hurley OT Occupational Therapist        Outcome Measures    No documentation.         Occupational Therapy Education                 Title: PT OT SLP Therapies (In Progress)     Topic: Occupational Therapy (In Progress)     Point: ADL training (In Progress)     Description:   Instruct learner(s) on proper safety adaptation and remediation techniques during self care or transfers.   Instruct in proper use of assistive devices.              Learning Progress Summary           Patient Acceptance, E,TB,D, NR by JUANA at 9/2/2021 0357    Acceptance, E,TB, NL,NR by CS at 8/31/2021 1441    Acceptance, E, NR,VU by BL at 8/28/2021 1521    Comment: The role of OT, hip precautions                   Point: Home exercise program (In Progress)     Description:   Instruct learner(s) on appropriate technique for monitoring, assisting and/or progressing therapeutic exercises/activities.              Learning Progress Summary           Patient Acceptance, E,TB,D, NR by JUANA at 9/2/2021 0357    Acceptance, E,TB, NL,NR by CS at 8/31/2021 1441                   Point: Precautions (In Progress)     Description:   Instruct learner(s) on prescribed precautions during self-care and functional transfers.              Learning Progress Summary           Patient Acceptance, E,TB,D, NR by KB at 9/2/2021 0357    Acceptance, E,TB, NL,NR by CS at 8/31/2021 1441    Acceptance, E, NR,VU by BL at 8/28/2021 1521    Comment: The role of OT, hip precautions                   Point: Body mechanics (In Progress)     Description:   Instruct learner(s) on proper positioning and spine alignment during self-care, functional mobility activities and/or  exercises.              Learning Progress Summary           Patient Acceptance, E,TB,D, NR by KB at 9/2/2021 0357    Acceptance, E,TB, NL,NR by CS at 8/31/2021 1441                               User Key     Initials Effective Dates Name Provider Type Discipline    KB 06/16/21 -  Bre Cevallos, RN Registered Nurse Nurse    BL 01/05/21 -  Cassy Coles OT Occupational Therapist OT    CS 02/02/21 -  Fe Fish RN Registered Nurse Nurse              OT Recommendation and Plan  Therapy Frequency (OT): 5 times/wk  Plan of Care Review  Plan of Care Reviewed With: patient  Progress: no change     Time Calculation:   Time Calculation- OT     Row Name 09/03/21 0910             Time Calculation- OT    OT Start Time  0833  -RB      OT Stop Time  0858  -RB      OT Time Calculation (min)  25 min  -RB      OT - Next Appointment  09/06/21  -RB         Timed Charges    48416 - OT Therapeutic Exercise Minutes  10  -RB      63559 - OT Self Care/Mgmt Minutes  15  -RB         Total Minutes    Timed Charges Total Minutes  25  -RB       Total Minutes  25  -RB        User Key  (r) = Recorded By, (t) = Taken By, (c) = Cosigned By    Initials Name Provider Type    RB Maribel Madera OT Occupational Therapist        Therapy Charges for Today     Code Description Service Date Service Provider Modifiers Qty    87849858348 HC OT SELF CARE/MGMT/TRAIN EA 15 MIN 9/3/2021 Maribel Madera OT GO 1    [id number] HC OT THERAPEUTIC ACT EA 15 MIN 9/3/2021 Maribel Madera OT GO 1               Maribel Madera OT  9/3/2021

## 2021-09-03 NOTE — PROGRESS NOTES
Today's preliminary report.  Lower extremity venous Doppler is positive for Acute DVT bilaterally. Report called to James Srinivasan RN

## 2021-09-04 VITALS
WEIGHT: 115 LBS | SYSTOLIC BLOOD PRESSURE: 90 MMHG | HEIGHT: 62 IN | HEART RATE: 72 BPM | DIASTOLIC BLOOD PRESSURE: 50 MMHG | RESPIRATION RATE: 16 BRPM | TEMPERATURE: 97.7 F | BODY MASS INDEX: 21.16 KG/M2 | OXYGEN SATURATION: 95 %

## 2021-09-04 PROBLEM — T79.6XXA TRAUMATIC RHABDOMYOLYSIS (HCC): Status: RESOLVED | Noted: 2021-08-28 | Resolved: 2021-09-04

## 2021-09-04 PROBLEM — N17.9 ACUTE KIDNEY FAILURE (HCC): Status: RESOLVED | Noted: 2021-08-30 | Resolved: 2021-09-04

## 2021-09-04 PROBLEM — I82.403 DVT OF LOWER EXTREMITY, BILATERAL (HCC): Status: ACTIVE | Noted: 2021-09-04

## 2021-09-04 PROBLEM — R73.9 ACUTE HYPERGLYCEMIA: Status: RESOLVED | Noted: 2021-08-29 | Resolved: 2021-09-04

## 2021-09-04 LAB
ANION GAP SERPL CALCULATED.3IONS-SCNC: 7 MMOL/L (ref 5–15)
BASOPHILS # BLD AUTO: 0.04 10*3/MM3 (ref 0–0.2)
BASOPHILS NFR BLD AUTO: 0.5 % (ref 0–1.5)
BH CV LOW VAS LEFT COMMON FEMORAL SPONT: 1
BH CV LOW VAS LEFT EXTERNAL ILIAC AUGMENT: NORMAL
BH CV LOW VAS LEFT EXTERNAL ILIAC COMPRESS: NORMAL
BH CV LOW VAS LEFT MID FEMORAL SPONT: 1
BH CV LOW VAS LEFT PERONEAL VESSEL: 1
BH CV LOW VAS LEFT SOLEAL VESSEL: 1
BH CV LOW VAS RIGHT COMMON FEMORAL SPONT: 1
BH CV LOW VAS RIGHT DISTAL FEMORAL SPONT: 1
BH CV LOW VAS RIGHT EXTERNAL ILIAC SPONT: 1
BH CV LOW VAS RIGHT GASTRONEMIUS VESSEL: 1
BH CV LOW VAS RIGHT GREATER SAPH AK VESSEL: 1
BH CV LOW VAS RIGHT MID FEMORAL SPONT: 1
BH CV LOW VAS RIGHT PERONEAL VESSEL: 1
BH CV LOW VAS RIGHT POPLITEAL SPONT: 1
BH CV LOW VAS RIGHT POSTERIOR TIBIAL VESSEL: 1
BH CV LOW VAS RIGHT PROFUNDA FEMORAL SPONT: 1
BH CV LOW VAS RIGHT PROXIMAL FEMORAL SPONT: 1
BH CV LOW VAS RIGHT SAPHENOFEMORAL JUNCTION SPONT: 1
BH CV LOW VAS RIGHT SOLEAL VESSEL: 1
BH CV LOWER VASCULAR LEFT COMMON FEMORAL AUGMENT: NORMAL
BH CV LOWER VASCULAR LEFT COMMON FEMORAL COMPETENT: NORMAL
BH CV LOWER VASCULAR LEFT COMMON FEMORAL COMPRESS: NORMAL
BH CV LOWER VASCULAR LEFT COMMON FEMORAL PHASIC: NORMAL
BH CV LOWER VASCULAR LEFT COMMON FEMORAL SPONT: NORMAL
BH CV LOWER VASCULAR LEFT COMMON FEMORAL THROMBUS: NORMAL
BH CV LOWER VASCULAR LEFT DISTAL FEMORAL COMPRESS: NORMAL
BH CV LOWER VASCULAR LEFT EXTERNAL ILIAC COMPETENT: NORMAL
BH CV LOWER VASCULAR LEFT EXTERNAL ILIAC PHASIC: NORMAL
BH CV LOWER VASCULAR LEFT EXTERNAL ILIAC SPONT: NORMAL
BH CV LOWER VASCULAR LEFT GASTRONEMIUS COMPRESS: NORMAL
BH CV LOWER VASCULAR LEFT GREATER SAPH AK COMPRESS: NORMAL
BH CV LOWER VASCULAR LEFT GREATER SAPH BK COMPRESS: NORMAL
BH CV LOWER VASCULAR LEFT LESSER SAPH COMPRESS: NORMAL
BH CV LOWER VASCULAR LEFT MID FEMORAL COMPRESS: NORMAL
BH CV LOWER VASCULAR LEFT MID FEMORAL PHASIC: NORMAL
BH CV LOWER VASCULAR LEFT MID FEMORAL SPONT: NORMAL
BH CV LOWER VASCULAR LEFT MID FEMORAL THROMBUS: NORMAL
BH CV LOWER VASCULAR LEFT PERONEAL COMPRESS: NORMAL
BH CV LOWER VASCULAR LEFT PERONEAL THROMBUS: NORMAL
BH CV LOWER VASCULAR LEFT POPLITEAL AUGMENT: NORMAL
BH CV LOWER VASCULAR LEFT POPLITEAL COMPETENT: NORMAL
BH CV LOWER VASCULAR LEFT POPLITEAL COMPRESS: NORMAL
BH CV LOWER VASCULAR LEFT POPLITEAL PHASIC: NORMAL
BH CV LOWER VASCULAR LEFT POPLITEAL SPONT: NORMAL
BH CV LOWER VASCULAR LEFT POSTERIOR TIBIAL COMPRESS: NORMAL
BH CV LOWER VASCULAR LEFT PROFUNDA FEMORAL COMPRESS: NORMAL
BH CV LOWER VASCULAR LEFT PROXIMAL FEMORAL COMPRESS: NORMAL
BH CV LOWER VASCULAR LEFT SAPHENOFEMORAL JUNCTION COMPRESS: NORMAL
BH CV LOWER VASCULAR LEFT SOLEAL COMPRESS: NORMAL
BH CV LOWER VASCULAR LEFT SOLEAL THROMBUS: NORMAL
BH CV LOWER VASCULAR RIGHT COMMON FEMORAL AUGMENT: NORMAL
BH CV LOWER VASCULAR RIGHT COMMON FEMORAL COMPETENT: NORMAL
BH CV LOWER VASCULAR RIGHT COMMON FEMORAL COMPRESS: NORMAL
BH CV LOWER VASCULAR RIGHT COMMON FEMORAL PHASIC: NORMAL
BH CV LOWER VASCULAR RIGHT COMMON FEMORAL SPONT: NORMAL
BH CV LOWER VASCULAR RIGHT COMMON FEMORAL THROMBUS: NORMAL
BH CV LOWER VASCULAR RIGHT DISTAL FEMORAL COMPRESS: NORMAL
BH CV LOWER VASCULAR RIGHT DISTAL FEMORAL THROMBUS: NORMAL
BH CV LOWER VASCULAR RIGHT EXTERNAL ILIAC COMPRESS: NORMAL
BH CV LOWER VASCULAR RIGHT EXTERNAL ILIAC THROMBUS: NORMAL
BH CV LOWER VASCULAR RIGHT GASTRONEMIUS COMPRESS: NORMAL
BH CV LOWER VASCULAR RIGHT GASTRONEMIUS THROMBUS: NORMAL
BH CV LOWER VASCULAR RIGHT GREATER SAPH AK COMPRESS: NORMAL
BH CV LOWER VASCULAR RIGHT GREATER SAPH AK THROMBUS: NORMAL
BH CV LOWER VASCULAR RIGHT GREATER SAPH BK COMPRESS: NORMAL
BH CV LOWER VASCULAR RIGHT LESSER SAPH COMPRESS: NORMAL
BH CV LOWER VASCULAR RIGHT MID FEMORAL COMPRESS: NORMAL
BH CV LOWER VASCULAR RIGHT MID FEMORAL PHASIC: NORMAL
BH CV LOWER VASCULAR RIGHT MID FEMORAL SPONT: NORMAL
BH CV LOWER VASCULAR RIGHT MID FEMORAL THROMBUS: NORMAL
BH CV LOWER VASCULAR RIGHT PERONEAL COMPRESS: NORMAL
BH CV LOWER VASCULAR RIGHT PERONEAL THROMBUS: NORMAL
BH CV LOWER VASCULAR RIGHT POPLITEAL AUGMENT: NORMAL
BH CV LOWER VASCULAR RIGHT POPLITEAL COMPETENT: NORMAL
BH CV LOWER VASCULAR RIGHT POPLITEAL COMPRESS: NORMAL
BH CV LOWER VASCULAR RIGHT POPLITEAL PHASIC: NORMAL
BH CV LOWER VASCULAR RIGHT POPLITEAL SPONT: NORMAL
BH CV LOWER VASCULAR RIGHT POPLITEAL THROMBUS: NORMAL
BH CV LOWER VASCULAR RIGHT POSTERIOR TIBIAL COMPRESS: NORMAL
BH CV LOWER VASCULAR RIGHT POSTERIOR TIBIAL THROMBUS: NORMAL
BH CV LOWER VASCULAR RIGHT PROFUNDA FEMORAL COMPRESS: NORMAL
BH CV LOWER VASCULAR RIGHT PROFUNDA FEMORAL THROMBUS: NORMAL
BH CV LOWER VASCULAR RIGHT PROXIMAL FEMORAL COMPRESS: NORMAL
BH CV LOWER VASCULAR RIGHT PROXIMAL FEMORAL THROMBUS: NORMAL
BH CV LOWER VASCULAR RIGHT SAPHENOFEMORAL JUNCTION COMPRESS: NORMAL
BH CV LOWER VASCULAR RIGHT SAPHENOFEMORAL JUNCTION THROMBUS: NORMAL
BH CV LOWER VASCULAR RIGHT SOLEAL COMPRESS: NORMAL
BH CV LOWER VASCULAR RIGHT SOLEAL THROMBUS: NORMAL
BH CV XLRA MEAS LEFT DIST CCA EDV: 8.3 CM/SEC
BH CV XLRA MEAS LEFT DIST CCA PSV: 51.9 CM/SEC
BH CV XLRA MEAS LEFT DIST ICA EDV: -12.6 CM/SEC
BH CV XLRA MEAS LEFT DIST ICA PSV: -52.4 CM/SEC
BH CV XLRA MEAS LEFT ICA/CCA RATIO: 1.08
BH CV XLRA MEAS LEFT MID ICA EDV: -12.6 CM/SEC
BH CV XLRA MEAS LEFT MID ICA PSV: -55.8 CM/SEC
BH CV XLRA MEAS LEFT PROX CCA EDV: 9.4 CM/SEC
BH CV XLRA MEAS LEFT PROX CCA PSV: 55.8 CM/SEC
BH CV XLRA MEAS LEFT PROX ECA EDV: -6.1 CM/SEC
BH CV XLRA MEAS LEFT PROX ECA PSV: -38.6 CM/SEC
BH CV XLRA MEAS LEFT PROX ICA EDV: -9.8 CM/SEC
BH CV XLRA MEAS LEFT PROX ICA PSV: -40 CM/SEC
BH CV XLRA MEAS LEFT PROX SCLA PSV: 90.6 CM/SEC
BH CV XLRA MEAS LEFT VERTEBRAL A EDV: 7.6 CM/SEC
BH CV XLRA MEAS LEFT VERTEBRAL A PSV: 31.7 CM/SEC
BH CV XLRA MEAS RIGHT DIST CCA EDV: 10.2 CM/SEC
BH CV XLRA MEAS RIGHT DIST CCA PSV: 57.7 CM/SEC
BH CV XLRA MEAS RIGHT DIST ICA EDV: -9 CM/SEC
BH CV XLRA MEAS RIGHT DIST ICA PSV: -43.2 CM/SEC
BH CV XLRA MEAS RIGHT ICA/CCA RATIO: 0.99
BH CV XLRA MEAS RIGHT MID ICA EDV: -12.6 CM/SEC
BH CV XLRA MEAS RIGHT MID ICA PSV: -57.4 CM/SEC
BH CV XLRA MEAS RIGHT PROX CCA EDV: 6.7 CM/SEC
BH CV XLRA MEAS RIGHT PROX CCA PSV: 57.8 CM/SEC
BH CV XLRA MEAS RIGHT PROX ECA EDV: -6.3 CM/SEC
BH CV XLRA MEAS RIGHT PROX ECA PSV: -55.4 CM/SEC
BH CV XLRA MEAS RIGHT PROX ICA EDV: -7.4 CM/SEC
BH CV XLRA MEAS RIGHT PROX ICA PSV: -32.6 CM/SEC
BH CV XLRA MEAS RIGHT PROX SCLA PSV: 119 CM/SEC
BH CV XLRA MEAS RIGHT VERTEBRAL A EDV: 6.2 CM/SEC
BH CV XLRA MEAS RIGHT VERTEBRAL A PSV: 26.9 CM/SEC
BUN SERPL-MCNC: 17 MG/DL (ref 8–23)
BUN/CREAT SERPL: 41.5 (ref 7–25)
CALCIUM SPEC-SCNC: 8.1 MG/DL (ref 8.2–9.6)
CHLORIDE SERPL-SCNC: 100 MMOL/L (ref 98–107)
CO2 SERPL-SCNC: 27 MMOL/L (ref 22–29)
CREAT SERPL-MCNC: 0.41 MG/DL (ref 0.57–1)
DEPRECATED RDW RBC AUTO: 44.4 FL (ref 37–54)
EOSINOPHIL # BLD AUTO: 0.28 10*3/MM3 (ref 0–0.4)
EOSINOPHIL NFR BLD AUTO: 3.8 % (ref 0.3–6.2)
ERYTHROCYTE [DISTWIDTH] IN BLOOD BY AUTOMATED COUNT: 13.7 % (ref 12.3–15.4)
GFR SERPL CREATININE-BSD FRML MDRD: 144 ML/MIN/1.73
GLUCOSE SERPL-MCNC: 112 MG/DL (ref 65–99)
HCT VFR BLD AUTO: 28.4 % (ref 34–46.6)
HGB BLD-MCNC: 9.2 G/DL (ref 12–15.9)
IMM GRANULOCYTES # BLD AUTO: 0.12 10*3/MM3 (ref 0–0.05)
IMM GRANULOCYTES NFR BLD AUTO: 1.6 % (ref 0–0.5)
LEFT ARM BP: NORMAL MMHG
LYMPHOCYTES # BLD AUTO: 1.53 10*3/MM3 (ref 0.7–3.1)
LYMPHOCYTES NFR BLD AUTO: 21 % (ref 19.6–45.3)
MAXIMAL PREDICTED HEART RATE: 123 BPM
MAXIMAL PREDICTED HEART RATE: 123 BPM
MCH RBC QN AUTO: 29 PG (ref 26.6–33)
MCHC RBC AUTO-ENTMCNC: 32.4 G/DL (ref 31.5–35.7)
MCV RBC AUTO: 89.6 FL (ref 79–97)
MONOCYTES # BLD AUTO: 0.62 10*3/MM3 (ref 0.1–0.9)
MONOCYTES NFR BLD AUTO: 8.5 % (ref 5–12)
NEUTROPHILS NFR BLD AUTO: 4.7 10*3/MM3 (ref 1.7–7)
NEUTROPHILS NFR BLD AUTO: 64.6 % (ref 42.7–76)
NRBC BLD AUTO-RTO: 0 /100 WBC (ref 0–0.2)
PLATELET # BLD AUTO: 167 10*3/MM3 (ref 140–450)
PMV BLD AUTO: 10.1 FL (ref 6–12)
POTASSIUM SERPL-SCNC: 4.1 MMOL/L (ref 3.5–5.2)
RBC # BLD AUTO: 3.17 10*6/MM3 (ref 3.77–5.28)
SODIUM SERPL-SCNC: 134 MMOL/L (ref 136–145)
STRESS TARGET HR: 105 BPM
STRESS TARGET HR: 105 BPM
WBC # BLD AUTO: 7.29 10*3/MM3 (ref 3.4–10.8)

## 2021-09-04 PROCEDURE — 85025 COMPLETE CBC W/AUTO DIFF WBC: CPT | Performed by: INTERNAL MEDICINE

## 2021-09-04 PROCEDURE — 80048 BASIC METABOLIC PNL TOTAL CA: CPT | Performed by: INTERNAL MEDICINE

## 2021-09-04 RX ORDER — FAMOTIDINE 20 MG/1
20 TABLET, FILM COATED ORAL DAILY
Qty: 30 TABLET | Refills: 3 | Status: SHIPPED | OUTPATIENT
Start: 2021-09-05

## 2021-09-04 RX ORDER — ROSUVASTATIN CALCIUM 20 MG/1
20 TABLET, COATED ORAL NIGHTLY
Qty: 30 TABLET | Refills: 3 | Status: SHIPPED | OUTPATIENT
Start: 2021-09-04 | End: 2021-12-23

## 2021-09-04 RX ORDER — LANOLIN ALCOHOL/MO/W.PET/CERES
3 CREAM (GRAM) TOPICAL NIGHTLY PRN
Qty: 30 TABLET | Refills: 3 | Status: SHIPPED | OUTPATIENT
Start: 2021-09-04

## 2021-09-04 RX ORDER — POLYETHYLENE GLYCOL 3350 17 G/17G
17 POWDER, FOR SOLUTION ORAL DAILY
Qty: 30 PACKET | Refills: 3 | Status: SHIPPED | OUTPATIENT
Start: 2021-09-05

## 2021-09-04 RX ORDER — ACETAMINOPHEN 325 MG/1
650 TABLET ORAL EVERY 4 HOURS PRN
Qty: 120 TABLET | Refills: 3 | Status: SHIPPED | OUTPATIENT
Start: 2021-09-04

## 2021-09-04 RX ORDER — LIDOCAINE 50 MG/G
1 PATCH TOPICAL
Qty: 30 PATCH | Refills: 3 | Status: SHIPPED | OUTPATIENT
Start: 2021-09-05

## 2021-09-04 RX ORDER — ASPIRIN 81 MG/1
81 TABLET ORAL DAILY
Qty: 30 TABLET | Refills: 3 | Status: SHIPPED | OUTPATIENT
Start: 2021-09-05

## 2021-09-04 RX ORDER — PSEUDOEPHEDRINE HCL 30 MG
100 TABLET ORAL 2 TIMES DAILY
Qty: 60 CAPSULE | Refills: 3 | Status: SHIPPED | OUTPATIENT
Start: 2021-09-04

## 2021-09-04 RX ORDER — HYDROCODONE BITARTRATE AND ACETAMINOPHEN 5; 325 MG/1; MG/1
1 TABLET ORAL EVERY 4 HOURS PRN
Qty: 10 TABLET | Refills: 0 | Status: SHIPPED | OUTPATIENT
Start: 2021-09-04 | End: 2021-09-06

## 2021-09-04 RX ORDER — ONDANSETRON 4 MG/1
4 TABLET, FILM COATED ORAL EVERY 6 HOURS PRN
Qty: 30 TABLET | Refills: 3 | Status: SHIPPED | OUTPATIENT
Start: 2021-09-04

## 2021-09-04 RX ADMIN — HYDROCODONE BITARTRATE AND ACETAMINOPHEN 1 TABLET: 5; 325 TABLET ORAL at 03:29

## 2021-09-04 RX ADMIN — APIXABAN 2.5 MG: 2.5 TABLET, FILM COATED ORAL at 09:38

## 2021-09-04 RX ADMIN — LIDOCAINE 1 PATCH: 50 PATCH TOPICAL at 09:40

## 2021-09-04 RX ADMIN — ASPIRIN 81 MG: 81 TABLET, COATED ORAL at 09:38

## 2021-09-04 RX ADMIN — FAMOTIDINE 20 MG: 20 TABLET, FILM COATED ORAL at 09:38

## 2021-09-04 RX ADMIN — SODIUM CHLORIDE, PRESERVATIVE FREE 3 ML: 5 INJECTION INTRAVENOUS at 09:40

## 2021-09-04 NOTE — PLAN OF CARE
Goal Outcome Evaluation:  Plan of Care Reviewed With: patient           Outcome Summary: RN left message re pt diet issues, but request was cancelled when called back. ST to follow for diet tolerance.

## 2021-09-04 NOTE — CASE MANAGEMENT/SOCIAL WORK
Continued Stay Note  [facility]     Patient Name: Marcella Stephens  MRN: [id number]  Today's Date: 9/4/2021    Admit Date: 8/27/2021    Discharge Plan     Row Name 09/04/21 1417       Plan    Plan  [facility] - [facility]    Patient/Family in Agreement with Plan  yes    Plan Comments  CCP spoke with Hermelinda with [facility] to confirm bed availability today 9/4/21 - confirmed. Number for report is [phone].  CCP arranged [facility] spoke with Gordon @ [phone] - set up for 15:00.  CCP spoke with Staff RN, Sandra to make her aware of plan.  CCP to fill out ambulance necessity form to be made available in EPIC.  Packet to be delivered to [facility]        Discharge Codes    No documentation.       Expected Discharge Date and Time     Expected Discharge Date Expected Discharge Time    Sep 4, 2021             Jorge L Monte

## 2021-09-04 NOTE — PLAN OF CARE
Goal Outcome Evaluation:PT POD7, Right hip with do not change dressing, VSS, afebrile, cantu cath intact draining dark yellow urine,cantu to remain until able to ambulate per urology, IC DC'd, report called to Kaden at [facility], packet sent with pt and ambulance staff. DTR aware of transport via phone call.

## 2021-09-04 NOTE — PLAN OF CARE
Goal Outcome Evaluation:  Plan of Care Reviewed With: patient        Progress: no change  Outcome Summary: vss, pt a/o x4 with periods of confusion, has bilateral lower ext dvts and rue dvt, rt hip dsg intact with dried drainage with order to not change dsg, pt has decreased dorsiplantar flexion in feet, pt incontinent stool, cantu patent and is to stay in per urology order until pt ambulatory, reports adequate pain control, discharge plan is for SNF upon discharge

## 2021-09-04 NOTE — DISCHARGE SUMMARY
Patient Name: Marcella Stephens  : 1923  MRN: [id number]    Date of Admission: 2021  Date of Discharge:  2021  Primary Care Physician: Noemi Chester MD      Discharge Diagnoses     Active Hospital Problems    Diagnosis  POA   • **Fall as cause of accidental injury at home as place of occurrence [W19.XXXA, Y92.009]  Not Applicable   • DVT of lower extremity, bilateral (CMS/HCC) [I82.403]  Yes   • Recent cerebrovascular accident (CVA) [Z86.73]  Yes   • Acute deep vein thrombosis (DVT) of brachial vein of right upper extremity (CMS/HCC) [I82.621]  Yes   • Weakness of right upper extremity [R29.898]  Yes   • Acute retention of urine [R33.8]  Yes   • Hydronephrosis [N13.30]  Yes   • Anemia [D64.9]  Yes   • Vitamin D deficiency [E55.9]  Yes   • Alzheimer's dementia (CMS/HCC) [G30.9, F02.80]  Yes   • Closed fracture of right hip (CMS/HCC) [S72.001A]  Yes      Resolved Hospital Problems    Diagnosis Date Resolved POA   • Acute kidney failure (CMS/HCC) [N17.9] 2021 Yes   • Acute hyperglycemia [R73.9] 2021 Yes   • Traumatic rhabdomyolysis (CMS/HCC) [T79.6XXA] 2021 Yes        Hospital Course     Brief admission history and physical. Please refer to the H&P for full details. A very pleasant 89 years old white female with a past history significant for glucose intolerance. Patient has fallen was found on the ground by her son at home. Mechanism of fall was not known. Complaint of right hip and right upper extremity pain and was unable to stand up. There was no history of head trauma neurological or cardiac symptoms that could be elicited. Her physical examination on admission included a temperature of 97.7 a pulse of 85 respiratory rate of 16 blood pressure 117/68. O2 sats 99% on room air. Rest of the examination is remarkable for an elderly patient/friable/swelling and pain in the right hand and wrist and right hip.  Hospital course initial work-up included a CBC that was normal  except a hemoglobin of 10.5. CMP was normal except a BUN of 37 AST of 38 blood sugar of 117. X-ray of the right wrist and hand revealed an old impacted distal right radial fracture with impaction and soft tissue edema without fractures. Right elbow x-ray revealed a small soft tissue swelling no acute fractures. X-ray of the right femur showed an angulated right intertrochanteric fracture. CK was elevated at 1273. EKG with normal sinus rhythm with old inferior infarction and no significant changes compared to previous EKGs. Covid was negative. Patient was admitted with a history of fall leading to right hip fracture/contusion of the right upper extremity/traumatic rhabdo. Orthopedic was consulted. Patient underwent open reduction internal fixation of the right hip fracture. Subsequently she was placed on aspirin for DVT prophylaxis. PT and OT evaluated the patient she made slow progress and she will need continuation of her physical therapy. As far as the traumatic rhabdomyolysis she was started on IV fluid her CK has improved and has normalized eventually and IV fluid has been stopped. She has developed an acute kidney failure and an acute urinary retention post surgery. She was thought to be volume depleted. She has not been on any nephrotoxic drugs. A renal ultrasound revealed a right hydronephrosis. Dupree catheter was inserted because of the urinary retention and hydronephrosis urology consult was obtained and they recommended continuation of the Dupree catheter until the patient has been fairly ambulatory. There was no evidence of UTI. She was noticed to be anemic and developed thrombocytopenia during this admission anemia work-up revealed anemia of chronic disease and postoperative anemia. She has been transfused during this admission with an appropriate hemoglobin response and the CBC should be rechecked at the skilled facility. She had a heme positive stool but GI was deferred secondary to the patient age.  Platelets has been improving. She was found to be vitamin D deficient and vitamin D was started. She has a history of glucose intolerance her A1c was 5.3 and her blood sugar Accu-Cheks remained stable. She has a history of dementia and she had postoperative changes in her mental status that has resolved back to the baseline with continued antidementia medications at the time of discharge. She was noted to have a continuous right upper extremity swelling and weakness and also developed a swelling and weakness of the right lower extremity. Neurology was consulted. MRI of the brain showed an acute/subacute left infarction. MRI of the neck showed degenerative disc disease at C3-C4 with foraminal narrowing. Both could be contributing to the patient right-sided weakness. Venous ultrasound revealed a DVT in the right upper extremity and DVTs in both lower extremity and anticoagulation initially with Lovenox and subsequently with Eliquis was maintained. Neurology added low-dose aspirin.  Carotid ultrasound showed no significant narrowing.  A 2D echo showed a normal ejection fraction with diastolic dysfunction mild AAS and positive saline test indicating a right-to-left shunt. Initially the patient was supposed to have a Zio patch to rule out A. fib at discharge however since the stroke reason is found which is mostly travel of the DVT through the shunt up to the brain was inserted and no Zio patch will be ordered. Neurology and orthopedic has signed off. She exhibited some swallowing disorder during her hospitalization . Speech therapy saw the patient and her diet was slowly advanced to soft mechanical and thin liquids and she is tolerating that fairly well. At the time of discharge she was hemodynamically stable. She is to follow-up with orthopedic/urology/primary care physician.    Consultants     Consult Orders (all) (From admission, onward)     Start     Ordered    08/31/21 1509  Inpatient Urology Consult  Once      Specialty:  Urology  Provider:  Lucas Baker MD    08/31/21 1509    08/31/21 1508  Inpatient Neurology Consult General  Once,   Status:  Canceled     Specialty:  Neurology  Provider:  Manjinder Kumari MD    08/31/21 1509    08/31/21 1118  Inpatient Neurology Consult  Once     Specialty:  Neurology  Provider:  Manjinder Kumari MD    08/31/21 1118    08/29/21 0858  Inpatient Case Management  Consult  Once     Provider:  (Not yet assigned)    08/29/21 0857    08/28/21 1125  Inpatient Case Management  Consult  Once,   Status:  Canceled     Provider:  (Not yet assigned)    08/28/21 1124    08/28/21 0000  Inpatient Case Management  Consult  Once,   Status:  Canceled     Provider:  (Not yet assigned)    08/27/21 1916    08/27/21 1916  Inpatient Orthopedic Surgery Consult  Once     Specialty:  Orthopedic Surgery  Provider:  Louis Scruggs MD    08/27/21 1916 08/27/21 1730  [facility] (on-call MD unless specified) Details  Once     Specialty:  Hospitalist  Provider:  Virginia Gregg MD    08/27/21 1729              Procedures     Imaging Results (All)     Procedure Component Value Units Date/Time    MRI Cervical Spine Without Contrast [[id number]] Collected: 09/02/21 1053     Updated: 09/02/21 1109    Narrative:      MRI OF THE BRAIN AND CERVICAL SPINE WITHOUT CONTRAST     CLINICAL HISTORY: Right hand weakness. Multiple falls.     MRI OF THE BRAIN:     TECHNIQUE: MRI of the brain was obtained with sagittal T1, axial T1,  axial FLAIR, axial T2, axial diffusion, and axial gradient echo images.     FINDINGS:      There is a punctate focus of diffusion-weighted hyperintensity within  the posterior aspect of the left centrum semiovale which measures up to  approximately 2 mm in diameter. This finding is suspicious for a tiny  acute to subacute infarct which could be an embolic event or could be  representative of an in situ small vessel thrombotic  event.     Severe confluent changes of chronic small vessel ischemic phenomena are  identified. The ventricles, sulci, and cisterns are age appropriate.  There are no abnormal areas of susceptibility artifact. The major  intracranial flow-related signal voids are within normal limits. The  midline intracranial anatomy is unremarkable.     There is mucosal thickening within the right maxillary sinus and the  ethmoid air cells. An air-fluid level is identified within the right  maxillary sinus.       Impression:         Within the posterior aspect of the left centrum semiovale, there is a 2  mm focus of diffusion-weighted hyperintensity which is suspicious for an  acute to subacute infarct and this could either be an embolic event or  an in situ small vessel thrombotic event.     Severe confluent changes of chronic small vessel ischemic phenomena are  identified.     MRI OF THE CERVICAL SPINE:     TECHNIQUE: MRI of the cervical spine was obtained with sagittal T1,  gradient echo, and T2-weighted images. Additionally, there are axial  gradient echo, axial T2, and oblique sagittal T2-weighted images through  the cervical spine.     FINDINGS:      The study is compromised by marked motion artifact.     At C2-3, there is no significant degree of canal or foraminal stenosis.     At C3-4, there is moderate right foraminal narrowing secondary to a  combination of uncovertebral joint hypertrophy and facet arthrosis.  There is no significant degree of left foraminal narrowing. A disc  osteophyte complex results in a mild degree of canal stenosis.     At C4-5, there is mild left foraminal narrowing secondary to facet  hypertrophic change. There is no significant degree of right foraminal  narrowing. No significant canal stenosis is identified.     At C5-6, there is a disc osteophyte complex which results in a mild  degree of canal narrowing. There is moderate left facet hypertrophic  change. However, mild left foraminal  narrowing is seen.      At C6-7, again, a disc osteophyte complex results in a mild degree of  canal narrowing. There is no significant degree of foraminal stenosis.     At C7-T1, there is no significant canal or foraminal narrowing.     The cervical spinal cord demonstrates no convincing signal abnormality.  There are no abnormal areas of susceptibility artifact. The visualized  contents of the cranial vault are unremarkable.     Incidental note is made of bilateral shoulder effusions.     IMPRESSION:      The current exam is compromised by marked motion artifact.     Only relatively mild degrees of multilevel canal narrowing are noted.     At the C3-4 level, there is a moderate degree of right foraminal  stenosis secondary to a combination of uncovertebral joint hypertrophy  and facet arthrosis. This is the level of the most prominent foraminal  compromise.     The remaining multilevel degenerative phenomena within the cervical  spine are as discussed in detail above.     Apparently, there is a history of falls. If there is any concern for a  cervical spine fracture, I would strongly suggest further evaluation  with a CT scan of the cervical spine.     Incidental note is made of bilateral shoulder effusions.     This report was finalized on 9/2/2021 11:06 AM by Dr. Delgado Wang M.D.       MRI Brain Without Contrast [[id number]] Collected: 09/02/21 1053     Updated: 09/02/21 1109    Narrative:      MRI OF THE BRAIN AND CERVICAL SPINE WITHOUT CONTRAST     CLINICAL HISTORY: Right hand weakness. Multiple falls.     MRI OF THE BRAIN:     TECHNIQUE: MRI of the brain was obtained with sagittal T1, axial T1,  axial FLAIR, axial T2, axial diffusion, and axial gradient echo images.     FINDINGS:      There is a punctate focus of diffusion-weighted hyperintensity within  the posterior aspect of the left centrum semiovale which measures up to  approximately 2 mm in diameter. This finding is suspicious for a tiny  acute to  subacute infarct which could be an embolic event or could be  representative of an in situ small vessel thrombotic event.     Severe confluent changes of chronic small vessel ischemic phenomena are  identified. The ventricles, sulci, and cisterns are age appropriate.  There are no abnormal areas of susceptibility artifact. The major  intracranial flow-related signal voids are within normal limits. The  midline intracranial anatomy is unremarkable.     There is mucosal thickening within the right maxillary sinus and the  ethmoid air cells. An air-fluid level is identified within the right  maxillary sinus.       Impression:         Within the posterior aspect of the left centrum semiovale, there is a 2  mm focus of diffusion-weighted hyperintensity which is suspicious for an  acute to subacute infarct and this could either be an embolic event or  an in situ small vessel thrombotic event.     Severe confluent changes of chronic small vessel ischemic phenomena are  identified.     MRI OF THE CERVICAL SPINE:     TECHNIQUE: MRI of the cervical spine was obtained with sagittal T1,  gradient echo, and T2-weighted images. Additionally, there are axial  gradient echo, axial T2, and oblique sagittal T2-weighted images through  the cervical spine.     FINDINGS:      The study is compromised by marked motion artifact.     At C2-3, there is no significant degree of canal or foraminal stenosis.     At C3-4, there is moderate right foraminal narrowing secondary to a  combination of uncovertebral joint hypertrophy and facet arthrosis.  There is no significant degree of left foraminal narrowing. A disc  osteophyte complex results in a mild degree of canal stenosis.     At C4-5, there is mild left foraminal narrowing secondary to facet  hypertrophic change. There is no significant degree of right foraminal  narrowing. No significant canal stenosis is identified.     At C5-6, there is a disc osteophyte complex which results in a  mild  degree of canal narrowing. There is moderate left facet hypertrophic  change. However, mild left foraminal narrowing is seen.      At C6-7, again, a disc osteophyte complex results in a mild degree of  canal narrowing. There is no significant degree of foraminal stenosis.     At C7-T1, there is no significant canal or foraminal narrowing.     The cervical spinal cord demonstrates no convincing signal abnormality.  There are no abnormal areas of susceptibility artifact. The visualized  contents of the cranial vault are unremarkable.     Incidental note is made of bilateral shoulder effusions.     IMPRESSION:      The current exam is compromised by marked motion artifact.     Only relatively mild degrees of multilevel canal narrowing are noted.     At the C3-4 level, there is a moderate degree of right foraminal  stenosis secondary to a combination of uncovertebral joint hypertrophy  and facet arthrosis. This is the level of the most prominent foraminal  compromise.     The remaining multilevel degenerative phenomena within the cervical  spine are as discussed in detail above.     Apparently, there is a history of falls. If there is any concern for a  cervical spine fracture, I would strongly suggest further evaluation  with a CT scan of the cervical spine.     Incidental note is made of bilateral shoulder effusions.     This report was finalized on 9/2/2021 11:06 AM by Dr. Delgado Wang M.D.       XR Chest PA & Lateral [[id number]] Collected: 08/31/21 1410     Updated: 08/31/21 1416    Narrative:      XR CHEST PA AND LATERAL-     Clinical: Postoperative atelectasis     COMPARISON 1/8/2014     FINDINGS: Shallow inspiratory effort causing crowding of the  bronchovascular markings, especially at the lung bases. No consolidation  or edema identified. The heart size is within normal limits. No  mediastinal or hilar abnormality has developed. No effusion or  pneumothorax seen. Old left rib deformities noted.      CONCLUSION: Shallow inspiratory effort with crowding of the  bronchovascular markings, especially at the lung bases, no other  interval change.     This report was finalized on 8/31/2021 2:13 PM by Dr. Chet Spears M.D.       XR Shoulder 2+ View Right [[id number]] Collected: 08/31/21 1130     Updated: 08/31/21 1139    Narrative:      RIGHT SHOULDER OPERATIVE X-RAY     HISTORY: Arm pain.     TECHNIQUE: Two images of the right shoulder are provided.     FINDINGS: There is advanced osteoarthritic change at the glenohumeral  and acromioclavicular joints. The humeral head is normally located. The  bones are demineralized. The space between the humeral head and the  acromion process is preserved on the external rotation view. There is no  x-ray evidence of a massive, chronic, full-thickness rotator cuff tear.       Impression:      Advanced arthritic change at the right shoulder.     This report was finalized on 8/31/2021 11:36 AM by Dr. Kirt Jimenes M.D.       US Renal Bilateral [[id number]] Collected: 08/30/21 2113     Updated: 08/30/21 2119    Narrative:      BILATERAL RENAL ULTRASOUND     CLINICAL HISTORY: Acute renal failure     Transverse and longitudinal images of both kidneys were obtained.      There is mild right hydronephrosis. Minimal left pelvocaliectasis is  also noted. No cystic or solid renal masses are demonstrated on these  images. The right kidney measures 8.0 x 4.9 x 4.7 cm. The left kidney  measures 8.7 x 5.9 x 4.4 cm. Images are of the urinary bladder show that  the bladder is quite distended measuring approximately 16 cm in  cephalocaudad dimension.     IMPRESSIONS: Markedly distended urinary bladder. Mild right  hydronephrosis and minimal left pelvocaliectasis.     This report was finalized on 8/30/2021 9:16 PM by Dr. Osiel Stinson M.D.       XR Humerus Right [[id number]] Collected: 08/30/21 2024     Updated: 08/30/21 2028    Narrative:      PORTABLE VIEWS OF THE RIGHT HUMERUS     CLINICAL  HISTORY: Patient fell. Arm swelling and pain.     3 views of the right humerus demonstrate diffuse osteopenia. No acute  fracture is identified.     This report was finalized on 8/30/2021 8:25 PM by Dr. Osiel Stinson M.D.       XR Hip With or Without Pelvis 2 - 3 View Right [[id number]] Collected: 08/28/21 1056     Updated: 08/28/21 1100    Narrative:      XR HIP W OR WO PELVIS 2-3 VIEW RIGHT-     INDICATIONS: Postoperative evaluation.     TECHNIQUE: Frontal views of the pelvis and right hip     COMPARISON: 08/27/2021     FINDINGS:      Intact appearing ned and screw surgical hardware is seen transfixing  the right intertrochanteric fracture, with adjacent surgical soft tissue  gas, overlying skin staples. Arterial calcification is present.          Impression:         Postsurgical changes.           This report was finalized on 8/28/2021 10:57 AM by Dr. Rick Ely M.D.       FL C Arm During Surgery [[id number]] Collected: 08/28/21 0944     Updated: 08/28/21 0947    Narrative:      XR HIP W OR WO PELVIS 2-3 VIEW RIGHT-, FL C ARM DURING SURGERY-     INDICATIONS: Right intertrochanteric fracture, ORIF     TECHNIQUE: FLUOROSCOPIC ASSISTANCE IN THE OPERATING ROOM.     FINDINGS:     4 intraoperative fluoroscopic spot views were obtained and recorded the  PACS for review, revealing ned and screw hardware fixation of right  intertrochanteric fracture. Please see operative report for full  details.     Fluoroscopy time: 124.2 seconds       Impression:         As described.     This report was finalized on 8/28/2021 9:44 AM by Dr. Rick Ely M.D.       XR Hip With or Without Pelvis 2 - 3 View Right [[id number]] Collected: 08/28/21 0944     Updated: 08/28/21 0947    Narrative:      XR HIP W OR WO PELVIS 2-3 VIEW RIGHT-, FL C ARM DURING SURGERY-     INDICATIONS: Right intertrochanteric fracture, ORIF     TECHNIQUE: FLUOROSCOPIC ASSISTANCE IN THE OPERATING ROOM.     FINDINGS:     4 intraoperative fluoroscopic  spot views were obtained and recorded the  PACS for review, revealing ned and screw hardware fixation of right  intertrochanteric fracture. Please see operative report for full  details.     Fluoroscopy time: 124.2 seconds       Impression:         As described.     This report was finalized on 8/28/2021 9:44 AM by Dr. Rick Ely M.D.       XR Wrist 3+ View Right [[id number]] Collected: 08/27/21 1739     Updated: 08/27/21 1744    Narrative:      RIGHT WRIST X-RAY     HISTORY: Fall. Wrist pain and swelling. Wrist fracture in May 2020.     TECHNIQUE: Three x-rays of the right wrist are correlated with right  wrist x-rays from May 23, 2020.     FINDINGS: The impacted distal radius fracture seen previously has healed  with residual anatomic deformity. There is substantial soft tissue edema  around the wrist and the bones appear profoundly demineralized. However,  no acute fracture is evident. There is advanced osteoarthritic change  between the scaphoid and the trapezium.       Impression:      Old impacted distal right radius fracture has healed with  significant impaction, foreshortening, and bony deformity. There is  substantial soft tissue edema but no acute fracture identified.     This report was finalized on 8/27/2021 5:40 PM by Dr. Kirt Jimenes M.D.       XR Elbow 2 View Right [[id number]] Collected: 08/27/21 1729     Updated: 08/27/21 1735    Narrative:      XR ELBOW 2 VW RIGHT-     INDICATIONS: Trauma     TECHNIQUE: 2 views of the right elbow     COMPARISON: None available     FINDINGS:     Small soft tissue swelling is seen posteriorly at the elbow. No acute  fracture, erosion, dislocation, or elbow effusion is identified.  Enthesopathy is suggested in the region of the triceps insertion.  Follow-up/further evaluation can be obtained as indications persist.       Impression:         As described.           This report was finalized on 8/27/2021 5:32 PM by Dr. Rick Ely M.D.       XR Femur  2 View Right [[id number]] Collected: 08/27/21 1715     Updated: 08/27/21 1721    Narrative:      XR FEMUR 2 VW RIGHT-     INDICATIONS: Trauma     TECHNIQUE: Frontal and lateral views of the right femur     COMPARISON: None available     FINDINGS:     An angulated right intertrochanteric fracture is noted. No other  fractures are identified. Small degenerative spurring is noted at the  right femoral head. The bones are diffusely osteopenic. No dislocation.  Arterial calcifications are present.       Impression:         Right intertrochanteric fracture.     This report was finalized on 8/27/2021 5:17 PM by Dr. Rick Ely M.D.             Pertinent Labs     Results from last 7 days   Lab Units 09/04/21 0327 09/02/21  1518 09/01/21  0341 08/31/21  0503   WBC 10*3/mm3 7.29 6.58 7.35 7.61   HEMOGLOBIN g/dL 9.2* 11.4* 10.6* 10.6*   PLATELETS 10*3/mm3 167 135* 108* 84*     Results from last 7 days   Lab Units 09/04/21 0327 09/03/21  1130 09/01/21  0341 08/31/21  0503   SODIUM mmol/L 134* 135* 137 134*   POTASSIUM mmol/L 4.1 3.8 3.9 4.3   CHLORIDE mmol/L 100 100 105 104   CO2 mmol/L 27.0 26.9 23.9 20.7*   BUN mg/dL 17 10 24* 46*   CREATININE mg/dL 0.41* 0.42* 0.36* 0.94   GLUCOSE mg/dL 112* 134* 95 83   Estimated Creatinine Clearance: 33.1 mL/min (A) (by C-G formula based on SCr of 0.41 mg/dL (L)).    Results from last 7 days   Lab Units 09/04/21  0327 09/03/21  1130 09/01/21  0341 08/31/21  0503 08/30/21  0722 08/29/21  0922   CALCIUM mg/dL 8.1* 8.7 8.3 8.3   < > 8.3   MAGNESIUM mg/dL  --   --   --   --   --  2.3    < > = values in this interval not displayed.       Results from last 7 days   Lab Units 09/01/21 0341 08/31/21  0503 08/29/21  0922 08/28/21  1628   CK TOTAL U/L 159 201* 383* 654*       Results from last 7 days   Lab Units 09/02/21  1518   CHOLESTEROL mg/dL 137   TRIGLYCERIDES mg/dL 111   HDL CHOL mg/dL 50   LDL CHOL mg/dL 67         Imaging Results (Last 24 Hours)     ** No results found for the  last 24 hours. **          Test Results Pending at Discharge         Discharge Exam   Physical Exam  Vitals. Temperature 97.7 a pulse of 72 respiratory rate of 16 and blood pressure 90/50 O2 sats of 95% on room air  General.  Elderly female.  Patient is alert.  Oriented x2..  No apparent pain distress or diaphoresis.    Friable.    Eyes.  Pupils equal round and reactive.  No pallor or jaundice.  Normal conjunctive and lids.  Positive bilateral arcus analysis and status post bilateral cataract surgery.    Oral cavity.  Moist mucous membranes.  Neck.  No JVD.  Supple.  No lymphadenopathy or thyromegaly.  Cardiovascular.  Regular rate and rhythm.  Grade 2 systolic murmur.  Chest.  Clear to auscultation bilaterally with no added sounds.  Abdomen.  Soft lax.  No tenderness.  No organomegaly.  No guarding or rebound.  Extremities.  No clubbing cyanosis   Dressed right hip wound with mild bloody discharge on the gauze.  +1 to +2 right upper extremity and lower extremity edema.  There is pain of the right wrist and right hand.  CNS.  There is a right upper/lower extremity weakness.  Otherwise no focal deficits could be appreciated.    Discharge Details        Discharge Medications      New Medications      Instructions Start Date   acetaminophen 325 MG tablet  Commonly known as: TYLENOL   650 mg, Oral, Every 4 Hours PRN      apixaban 2.5 MG tablet tablet  Commonly known as: ELIQUIS   2.5 mg, Oral, Every 12 Hours Scheduled      aspirin 81 MG EC tablet   81 mg, Oral, Daily   Start Date: September 5, 2021     docusate sodium 100 MG capsule   100 mg, Oral, 2 Times Daily      famotidine 20 MG tablet  Commonly known as: PEPCID   20 mg, Oral, Daily   Start Date: September 5, 2021     HYDROcodone-acetaminophen 5-325 MG per tablet  Commonly known as: NORCO   1 tablet, Oral, Every 4 Hours PRN      lidocaine 5 %  Commonly known as: LIDODERM   1 patch, Transdermal, Every 24 Hours Scheduled, Remove & Discard patch within 12 hours or as  directed by MD   Start Date: September 5, 2021     melatonin 3 MG tablet   3 mg, Oral, Nightly PRN      ondansetron 4 MG tablet  Commonly known as: ZOFRAN   4 mg, Oral, Every 6 Hours PRN      polyethylene glycol 17 g packet  Commonly known as: MIRALAX   17 g, Oral, Daily   Start Date: September 5, 2021     rosuvastatin 20 MG tablet  Commonly known as: CRESTOR   20 mg, Oral, Nightly      vitamin D 1.25 MG (98563 UT) capsule capsule  Commonly known as: ERGOCALCIFEROL   50,000 Units, Oral, Every 7 Days, X 4 weeks   Start Date: September 5, 2021        Continue These Medications      Instructions Start Date   galantamine 8 MG tablet  Commonly known as: RAZADYNE   8 mg, Oral, Daily      multivitamin with minerals tablet tablet   Oral, Daily             Allergies   Allergen Reactions   • Donepezil Nausea And Vomiting         Discharge Disposition:  Condition: Stable    Diet:   Diet Order   Procedures   • Diet Soft Texture; Chopped; Thin       Activity:   Activity Instructions     Activity as Tolerated      Other Activity Instructions      Activity Instructions: PT/OT to evaluate and treat. Patient is status post right hip open reduction internal fixation/CVA with right-sided weakness/history of DVTs    Up WIth Assist                CODE STATUS:    Code Status and Medical Interventions:   Ordered at: 08/27/21 1916     Code Status:    CPR     Medical Interventions (Level of Support Prior to Arrest):    Full       No future appointments.  Additional Instructions for the Follow-ups that You Need to Schedule     Call MD With Problems / Concerns   As directed      Instructions: Call MD or return to ER if bleeding diathesis. New neurological symptoms. Chest pain. Shortness of breath. Fever or chills.    Order Comments: Instructions: Call MD or return to ER if bleeding diathesis. New neurological symptoms. Chest pain. Shortness of breath. Fever or chills.          Discharge Follow-up with PCP   As directed       Currently  Documented PCP:    Noemi Chester MD    PCP Phone Number:    [phone]     Follow Up Details: Primary MD at the skilled facility in 3 days for bilateral lower extremity and right upper extremity DVT/CVA/anemia/right hip fracture         Discharge Follow-up with Specialty: Orthopedics; 2 Weeks   As directed      Specialty: Orthopedics    Follow Up: 2 Weeks    Follow Up Details: Turn to the office to see Dr. Louis Scruggs.         Discharge Follow-up with Specified Provider: Orthopedic; 2 Weeks   As directed      To: Orthopedic    Follow Up: 2 Weeks    Follow Up Details: Right hip fracture status post open reduction internal fixation         Discharge Follow-up with Specified Provider: Urology; 2 Weeks   As directed      To: Urology    Follow Up: 2 Weeks    Follow Up Details: Urinary retention and hydronephrosis            Contact information for follow-up providers     Noemi Chester MD .    Specialty: Internal Medicine  Why: Primary MD at the [facility] facility in 3 days for bilateral lower extremity and right upper extremity DVT/CVA/anemia/right hip fracture  Contact information:  [address] [id number] [phone]                   Contact information for after-discharge care     Destination     [facility] .    Service: Skilled Nursing  Contact information:  [address] [place] [id number] [phone]                               Time Spent on Discharge:  Greater than 30 minutes      Grace Girard MD  [facility]  09/04/21  10:22 EDT

## 2021-09-04 NOTE — CASE MANAGEMENT/SOCIAL WORK
"Physicians Statement of Medical Necessity for  Ambulance Transportation    GENERAL INFORMATION     Name: Marcella Stephens  YOB: 1923  Medicare #:   Transport Date: 9/4/21 (Valid for round trips this date, or for scheduled repetitive trips for 60 days from the date signed below.)  [facility]  Destination: [address] [place] [address] ( [facility] Rm 262)  Is the Patient's stay covered under Medicare Part A (PPS/DRG?)Yes  Closest appropriate facility? Yes  If this a hosp-hosp transfer? No  Is this a hospice patient? No    MEDICAL NECESSITY QUESTIONAIRE    Ambulance Transportation is medically necessary only if other means of transportation are contraindicated or would be potentially harmful to the patient.  To meet this requirement, the patient must be either \"bed confined\" or suffer from a condition such that transport by means other than an ambulance is contraindicated by the patient's condition.  The following questions must be answered by the healthcare professional signing below for this form to be valid:     1) Describe the MEDICAL CONDITION (physical and/or mental) of this patient AT THE TIME OF AMBULANCE TRANSPORT that requires the patient to be transported in an ambulance, and why transport by other means is contraindicated by the patient's condition: Max assist x1, weakness  Past Medical History:   Diagnosis Date   • Prediabetes       Past Surgical History:   Procedure Laterality Date   • BACK SURGERY     • FEMUR IM NAILING/RODDING Right 8/28/2021    Procedure: FEMUR INTRAMEDULLARY NAILING/RODDING;  Surgeon: Louis Scruggs MD;  Location: [facility];  Service: Orthopedics;  Laterality: Right;   • KNEE SURGERY        2) Is this patient \"bed confined\" as defined below?No    To be \"bed confined\" the patient must satisfy all three of the following criteria:  (1) unable to get up from bed without assistance; AND (2) unable to ambulate;  AND (3) unable to " sit in a chair or wheelchair.  3) Can this patient safely be transported by car or wheelchair van (I.e., may safely sit during transport, without an attendant or monitoring?)No   4. In addition to completing questions 1-3 above, please check any of the following conditions that apply*:          *Note: supporting documentation for any boxes checked must be maintained in the patient's medical records Unable to sit in a chair or wheelchair due to decubitus ulcers or other wounds      SIGNATURE OF PHYSICIAN OR OTHER AUTHORIZED HEALTHCARE PROFESSIONAL    I certify that the above information is true and correct based on my evaluation of this patient, and represent that the patient requires transport by ambulance and that other forms of transport are contraindicated.  I understand that this information will be used by the Centers for Medicare and Medicaid Services (CMS) to support the determiniation of medical necessity for ambulance services, and I represent that I have personal knowledge of the patient's condition at the time of transport.       If this box is checked, I also certify that the patient is physically or mentally incapable of signing the ambulance service's claim form and that the institution with which I am affiliated has furnished care, services or assistance to the patient.  My signature below is made on behalf of the patient pursuant to 42 .36(b)(4). In accordance with 42 .37, the specific reason(s) that the patient is physically or mentally incapable of signing the claim for is as follows:     Signature of Physician or Healthcare Professional  Date/Time:        (For Scheduled repetitive transport, this form is not valid for transports performed more than 60 days after this date).                                                                                                                                             --------------------------------------------------------------------------------------------  Printed Name and Credentials of Physician or Authorized Healthcare Professional     *Form must be signed by patient's attending physician for scheduled, repetitive transports,.  For non-repetitive ambulance transports, if unable to obtain the signature of the attending physician, any of the following may sign (please select below):     Physician  Clinical Nurse Specialist  Registered Nurse     Physician Assistant  Discharge Planner  Licensed Practical Nurse     Nurse Practitioner

## 2021-09-07 NOTE — CASE MANAGEMENT/SOCIAL WORK
Case Management Discharge Note      Final Note: [facility].    Provided Post Acute Provider List?: N/A  N/A Provider List Comment: Requests [facility].    Selected Continued Care - Discharged on 9/4/2021 Admission date: 8/27/2021 - Discharge disposition: Skilled Nursing Facility (DC - External)    Destination Coordination complete.    Service Provider Selected Services Address Phone Fax Patient Preferred    [facility] [facility] [address] [phone] --          Durable Medical Equipment    No services have been selected for the patient.              Dialysis/Infusion    No services have been selected for the patient.              Home Medical Care    No services have been selected for the patient.              Therapy    No services have been selected for the patient.              Community Resources    No services have been selected for the patient.              Community & DME    No services have been selected for the patient.                       Final Discharge Disposition Code: 03 - skilled nursing facility (SNF)

## 2021-09-20 ENCOUNTER — OFFICE VISIT (OUTPATIENT)
Dept: ORTHOPEDIC SURGERY | Facility: CLINIC | Age: 86
End: 2021-09-20

## 2021-09-20 ENCOUNTER — TELEPHONE (OUTPATIENT)
Dept: ORTHOPEDIC SURGERY | Facility: CLINIC | Age: 86
End: 2021-09-20

## 2021-09-20 VITALS — TEMPERATURE: 98.2 F

## 2021-09-20 DIAGNOSIS — M25.552 BILATERAL HIP PAIN: ICD-10-CM

## 2021-09-20 DIAGNOSIS — M25.551 BILATERAL HIP PAIN: ICD-10-CM

## 2021-09-20 DIAGNOSIS — S72.001A CLOSED FRACTURE OF RIGHT HIP, INITIAL ENCOUNTER (HCC): Primary | ICD-10-CM

## 2021-09-20 PROCEDURE — 99024 POSTOP FOLLOW-UP VISIT: CPT | Performed by: ORTHOPAEDIC SURGERY

## 2021-09-20 PROCEDURE — 73521 X-RAY EXAM HIPS BI 2 VIEWS: CPT | Performed by: ORTHOPAEDIC SURGERY

## 2021-09-20 RX ORDER — PANTOPRAZOLE SODIUM 40 MG/1
40 TABLET, DELAYED RELEASE ORAL DAILY
COMMUNITY
End: 2021-11-05 | Stop reason: HOSPADM

## 2021-09-20 RX ORDER — MELOXICAM 15 MG/1
15 TABLET ORAL DAILY
COMMUNITY
End: 2021-11-05 | Stop reason: HOSPADM

## 2021-09-20 RX ORDER — TRAZODONE HYDROCHLORIDE 50 MG/1
50 TABLET ORAL DAILY
COMMUNITY
Start: 2021-09-13 | End: 2021-12-23

## 2021-09-20 RX ORDER — APIXABAN 5 MG/1
5 TABLET, FILM COATED ORAL 2 TIMES DAILY
COMMUNITY
Start: 2021-09-16 | End: 2021-11-05 | Stop reason: HOSPADM

## 2021-09-20 RX ORDER — OXYCODONE HYDROCHLORIDE 5 MG/1
TABLET ORAL
COMMUNITY
Start: 2021-09-14 | End: 2021-11-05 | Stop reason: HOSPADM

## 2021-09-20 NOTE — TELEPHONE ENCOUNTER
HUB ATTEMPTED WARM TRANSFER PROTOCOL, WAS UNSUCCESSFUL:    Provider: DR. ANNETTE WALEKR    Caller: RADHA NURSE      # [phone]    Reason for Call: DOPPLER RESULTS - REGARD TO SPECIFIC VEIN.    PLEASE RETURN CALL URGENTLY

## 2021-09-20 NOTE — PROGRESS NOTES
Marcella Stephens     : 1923     MRN: [id number]     DATE: 2021    CC:  3 weeks status post cephalomedullary fixation of femur fracture    Vitals:    21 1105   Temp: 98.2 °F (36.8 °C)       HPI: Patient returns to clinic today, now 3 weeks out from surgery.  She has been residing at [facility] facility.  Her daughter is here with her.  Still has her Dupree and continues has right leg swelling and pain.  She does have known he has been getting treatment for bilateral lower extremity DVTs in right upper extremity DVT.  On Eliquis for this.  States that they are not really ambulating her at all at the facility.  She did have a recent fall yesterday apparently and try to get out of bed they did some x-rays were concerned about a new fracture of her inferior ramus on the right side she was seen at [facility] additional hip x-rays were taken did not show any changes from her previous inotrope fracture with implants in place.    Physical exam:   Right lower extremity: Generalized edema.  Incisions well-healed plan to move staples today.  She can move her ankle up and down.  Some discomfort with hip range of motion.  Strength 4 months out of 5.  Calf is soft compressible no overt pain there.  2+ pedal pulses.    She is able to move her right upper extremity and left arm of the head.    Imaging  5 views of the right hip and knee (including AP and lateral) are ordered and reviewed for comparison purposes and to evaluate healing.  X-rays show alignment unchanged.  No overt evidence of callus formation yet on imaging.  No significant changes given recent fall.  And I do not appreciate any right pubic ramus fracture.    Impression:  3 weeks s/p cephalomedullary fixation of right femur fracture, with bilateral lower extremity DVTs and right upper extremity DVT    Plan:    Continue DVT therapeutic medication.  PT for mobilization patient needs to be weightbearing as tolerated to right lower  extremity with a walker.  No abduction or abduction exercise at this time.  Plan to DC her oxycodone and meloxicam.  We will try her on some tramadol.  Also order repeat he has Doppler right lower extremity to be done today to call us with results.  Strength exercises lateral upper extremities.  Recommend to DC the Dupree catheter and to collect urine for culture.  Tinea and vitamin D.  Follow-up in 3 weeks with repeat right hip films.    Questions were answered.  Patient and the daughter understand agree with plan.    Louis Scruggs MD

## 2021-09-21 NOTE — TELEPHONE ENCOUNTER
Have spoken with the nurse at [facility] regarding her Doppler results.  The Doppler is positive for DVT.  I have asked them to fax over a copy of the Doppler report.  Report shows positive for common femoral and superficial femoral DVT.  They were able to visualize the popliteal and posterior tibial veins and appear compressible.  Her prior study done in the hospital on September 3 showed an acute right lower extremity DVT in the external iliac, common femoral, deep femoral, proximal, mid, distal femoral veins, popliteal, posterior tibial, peroneal, gastrocnemius and soleal veins.  Patient also was noted to have a left common femoral DVT.  It appears that the repeat scan done yesterday shows some improvement.  For now have instructed the nurses at [facility] to continue with her Eliquis 5 mg p.o. twice daily.  Will discuss with Dr. Scruggs and will contact them if I have any new orders

## 2021-09-22 ENCOUNTER — TELEPHONE (OUTPATIENT)
Dept: ORTHOPEDIC SURGERY | Facility: CLINIC | Age: 86
End: 2021-09-22

## 2021-09-22 NOTE — TELEPHONE ENCOUNTER
----- Message from Louis Scruggs MD sent at 9/22/2021 11:58 AM EDT -----  Thank you let me know what we might need to do to make sure a primary care doctor for her will take over this care and continue to follow her.  ----- Message -----  From: Heavenly Baxter RN  Sent: 9/21/2021   2:54 PM EDT  To: Louis Scruggs MD    This Doppler actually looks better than the one that was done in the hospital. She did have bilateral lower extremity DVTs in the hospital however we only ordered a right lower extremity Doppler yesterday. Have advised the nursing home to continue with the same dose of Eliquis for now unless you have further orders

## 2021-09-22 NOTE — TELEPHONE ENCOUNTER
Received a message from the nurse practitioner at the [facility] to make sure that we received all the information regarding her Doppler.  I did leave her message stating that I did receive all the information however I would like to speak with her about his anticoagulants.  She will need to remain on anticoagulation for minimum 6 months.  Will have the [facility] manage her Eliquis while she is there and then will discuss with the nurse practitioner about having her family physician take over after her discharge

## 2021-09-23 ENCOUNTER — TELEPHONE (OUTPATIENT)
Dept: ORTHOPEDIC SURGERY | Facility: CLINIC | Age: 86
End: 2021-09-23

## 2021-09-23 NOTE — TELEPHONE ENCOUNTER
Received a message from the nurse practitioner at the [facility].  Since the patient will require anticoagulation for her bilateral lower extremity and right upper extremity DVT for at least 6 months they will manage her anticoagulants and DVT for now.  Once the patient is discharged from rehab she will make arrangements for the PCP to take over.

## 2021-10-22 ENCOUNTER — HOSPITAL ENCOUNTER (INPATIENT)
Facility: HOSPITAL | Age: 86
LOS: 9 days | Discharge: NURSING, REHAB OR LONG-TERM CARE | End: 2021-11-05
Attending: EMERGENCY MEDICINE | Admitting: HOSPITALIST

## 2021-10-22 ENCOUNTER — APPOINTMENT (OUTPATIENT)
Dept: GENERAL RADIOLOGY | Facility: HOSPITAL | Age: 86
End: 2021-10-22

## 2021-10-22 DIAGNOSIS — R53.1 WEAKNESS: ICD-10-CM

## 2021-10-22 DIAGNOSIS — R77.8 ELEVATED TROPONIN: ICD-10-CM

## 2021-10-22 DIAGNOSIS — R79.89 ABNORMAL TSH: ICD-10-CM

## 2021-10-22 DIAGNOSIS — F03.90 DEMENTIA WITHOUT BEHAVIORAL DISTURBANCE, UNSPECIFIED DEMENTIA TYPE: ICD-10-CM

## 2021-10-22 DIAGNOSIS — M25.551 RIGHT HIP PAIN: ICD-10-CM

## 2021-10-22 DIAGNOSIS — D64.9 ANEMIA, UNSPECIFIED TYPE: ICD-10-CM

## 2021-10-22 DIAGNOSIS — M25.561 ACUTE PAIN OF RIGHT KNEE: ICD-10-CM

## 2021-10-22 DIAGNOSIS — N39.0 ACUTE UTI: Primary | ICD-10-CM

## 2021-10-22 DIAGNOSIS — R26.2 UNABLE TO AMBULATE: ICD-10-CM

## 2021-10-22 LAB
ALBUMIN SERPL-MCNC: 2.7 G/DL (ref 3.5–5.2)
ALBUMIN/GLOB SERPL: 0.9 G/DL
ALP SERPL-CCNC: 77 U/L (ref 39–117)
ALT SERPL W P-5'-P-CCNC: 10 U/L (ref 1–33)
ANION GAP SERPL CALCULATED.3IONS-SCNC: 11.6 MMOL/L (ref 5–15)
AST SERPL-CCNC: 20 U/L (ref 1–32)
BACTERIA UR QL AUTO: ABNORMAL /HPF
BASOPHILS # BLD AUTO: 0.03 10*3/MM3 (ref 0–0.2)
BASOPHILS NFR BLD AUTO: 0.5 % (ref 0–1.5)
BILIRUB SERPL-MCNC: 0.4 MG/DL (ref 0–1.2)
BILIRUB UR QL STRIP: NEGATIVE
BUN SERPL-MCNC: 27 MG/DL (ref 8–23)
BUN/CREAT SERPL: 65.9 (ref 7–25)
CALCIUM SPEC-SCNC: 8.7 MG/DL (ref 8.2–9.6)
CHLORIDE SERPL-SCNC: 106 MMOL/L (ref 98–107)
CLARITY UR: ABNORMAL
CO2 SERPL-SCNC: 24.4 MMOL/L (ref 22–29)
COLOR UR: ABNORMAL
CREAT SERPL-MCNC: 0.41 MG/DL (ref 0.57–1)
DEPRECATED RDW RBC AUTO: 49.7 FL (ref 37–54)
EOSINOPHIL # BLD AUTO: 0.07 10*3/MM3 (ref 0–0.4)
EOSINOPHIL NFR BLD AUTO: 1.2 % (ref 0.3–6.2)
ERYTHROCYTE [DISTWIDTH] IN BLOOD BY AUTOMATED COUNT: 15.5 % (ref 12.3–15.4)
GFR SERPL CREATININE-BSD FRML MDRD: 144 ML/MIN/1.73
GLOBULIN UR ELPH-MCNC: 3.1 GM/DL
GLUCOSE SERPL-MCNC: 105 MG/DL (ref 65–99)
GLUCOSE UR STRIP-MCNC: NEGATIVE MG/DL
HCT VFR BLD AUTO: 30.6 % (ref 34–46.6)
HGB BLD-MCNC: 10 G/DL (ref 12–15.9)
HGB UR QL STRIP.AUTO: ABNORMAL
HYALINE CASTS UR QL AUTO: ABNORMAL /LPF
IMM GRANULOCYTES # BLD AUTO: 0.02 10*3/MM3 (ref 0–0.05)
IMM GRANULOCYTES NFR BLD AUTO: 0.4 % (ref 0–0.5)
KETONES UR QL STRIP: ABNORMAL
LEUKOCYTE ESTERASE UR QL STRIP.AUTO: ABNORMAL
LYMPHOCYTES # BLD AUTO: 1.58 10*3/MM3 (ref 0.7–3.1)
LYMPHOCYTES NFR BLD AUTO: 27.7 % (ref 19.6–45.3)
MAGNESIUM SERPL-MCNC: 2 MG/DL (ref 1.7–2.3)
MCH RBC QN AUTO: 29.1 PG (ref 26.6–33)
MCHC RBC AUTO-ENTMCNC: 32.7 G/DL (ref 31.5–35.7)
MCV RBC AUTO: 89 FL (ref 79–97)
MONOCYTES # BLD AUTO: 0.54 10*3/MM3 (ref 0.1–0.9)
MONOCYTES NFR BLD AUTO: 9.5 % (ref 5–12)
NEUTROPHILS NFR BLD AUTO: 3.47 10*3/MM3 (ref 1.7–7)
NEUTROPHILS NFR BLD AUTO: 60.7 % (ref 42.7–76)
NITRITE UR QL STRIP: NEGATIVE
NRBC BLD AUTO-RTO: 0 /100 WBC (ref 0–0.2)
NT-PROBNP SERPL-MCNC: 5400 PG/ML (ref 0–1800)
PH UR STRIP.AUTO: >=9 [PH] (ref 5–8)
PLATELET # BLD AUTO: 310 10*3/MM3 (ref 140–450)
PMV BLD AUTO: 9.1 FL (ref 6–12)
POTASSIUM SERPL-SCNC: 3.7 MMOL/L (ref 3.5–5.2)
PROCALCITONIN SERPL-MCNC: 0.21 NG/ML (ref 0–0.25)
PROT SERPL-MCNC: 5.8 G/DL (ref 6–8.5)
PROT UR QL STRIP: ABNORMAL
RBC # BLD AUTO: 3.44 10*6/MM3 (ref 3.77–5.28)
RBC # UR: ABNORMAL /HPF
REF LAB TEST METHOD: ABNORMAL
SODIUM SERPL-SCNC: 142 MMOL/L (ref 136–145)
SP GR UR STRIP: 1.02 (ref 1–1.03)
SQUAMOUS #/AREA URNS HPF: ABNORMAL /HPF
T4 FREE SERPL-MCNC: 1.14 NG/DL (ref 0.93–1.7)
TRI-PHOS CRY URNS QL MICRO: ABNORMAL /HPF
TROPONIN T SERPL-MCNC: 0.12 NG/ML (ref 0–0.03)
TSH SERPL DL<=0.05 MIU/L-ACNC: 12 UIU/ML (ref 0.27–4.2)
UROBILINOGEN UR QL STRIP: ABNORMAL
WBC # BLD AUTO: 5.71 10*3/MM3 (ref 3.4–10.8)
WBC UR QL AUTO: ABNORMAL /HPF

## 2021-10-22 PROCEDURE — 51701 INSERT BLADDER CATHETER: CPT

## 2021-10-22 PROCEDURE — 84439 ASSAY OF FREE THYROXINE: CPT | Performed by: EMERGENCY MEDICINE

## 2021-10-22 PROCEDURE — 87186 SC STD MICRODIL/AGAR DIL: CPT | Performed by: NURSE PRACTITIONER

## 2021-10-22 PROCEDURE — 93005 ELECTROCARDIOGRAM TRACING: CPT | Performed by: EMERGENCY MEDICINE

## 2021-10-22 PROCEDURE — 99284 EMERGENCY DEPT VISIT MOD MDM: CPT

## 2021-10-22 PROCEDURE — 71045 X-RAY EXAM CHEST 1 VIEW: CPT

## 2021-10-22 PROCEDURE — 84443 ASSAY THYROID STIM HORMONE: CPT | Performed by: EMERGENCY MEDICINE

## 2021-10-22 PROCEDURE — 80053 COMPREHEN METABOLIC PANEL: CPT | Performed by: EMERGENCY MEDICINE

## 2021-10-22 PROCEDURE — 87077 CULTURE AEROBIC IDENTIFY: CPT | Performed by: NURSE PRACTITIONER

## 2021-10-22 PROCEDURE — 85025 COMPLETE CBC W/AUTO DIFF WBC: CPT | Performed by: EMERGENCY MEDICINE

## 2021-10-22 PROCEDURE — 36415 COLL VENOUS BLD VENIPUNCTURE: CPT

## 2021-10-22 PROCEDURE — 84484 ASSAY OF TROPONIN QUANT: CPT | Performed by: EMERGENCY MEDICINE

## 2021-10-22 PROCEDURE — 83880 ASSAY OF NATRIURETIC PEPTIDE: CPT | Performed by: EMERGENCY MEDICINE

## 2021-10-22 PROCEDURE — 73502 X-RAY EXAM HIP UNI 2-3 VIEWS: CPT

## 2021-10-22 PROCEDURE — 87086 URINE CULTURE/COLONY COUNT: CPT | Performed by: NURSE PRACTITIONER

## 2021-10-22 PROCEDURE — 93010 ELECTROCARDIOGRAM REPORT: CPT | Performed by: INTERNAL MEDICINE

## 2021-10-22 PROCEDURE — 73560 X-RAY EXAM OF KNEE 1 OR 2: CPT

## 2021-10-22 PROCEDURE — 84145 PROCALCITONIN (PCT): CPT | Performed by: EMERGENCY MEDICINE

## 2021-10-22 PROCEDURE — G0378 HOSPITAL OBSERVATION PER HR: HCPCS

## 2021-10-22 PROCEDURE — 83735 ASSAY OF MAGNESIUM: CPT | Performed by: EMERGENCY MEDICINE

## 2021-10-22 PROCEDURE — 81001 URINALYSIS AUTO W/SCOPE: CPT | Performed by: EMERGENCY MEDICINE

## 2021-10-22 RX ORDER — HYDROXYZINE HYDROCHLORIDE 25 MG/1
25 TABLET, FILM COATED ORAL ONCE
Status: COMPLETED | OUTPATIENT
Start: 2021-10-22 | End: 2021-10-22

## 2021-10-22 RX ORDER — ACETAMINOPHEN 500 MG
1000 TABLET ORAL ONCE
Status: COMPLETED | OUTPATIENT
Start: 2021-10-22 | End: 2021-10-22

## 2021-10-22 RX ADMIN — HYDROXYZINE HYDROCHLORIDE 25 MG: 25 TABLET ORAL at 23:28

## 2021-10-22 RX ADMIN — ACETAMINOPHEN 1000 MG: 500 TABLET ORAL at 23:27

## 2021-10-23 PROBLEM — R54 AGE-RELATED PHYSICAL DEBILITY: Status: ACTIVE | Noted: 2021-10-23

## 2021-10-23 PROBLEM — R79.89 ELEVATED TROPONIN: Status: ACTIVE | Noted: 2021-10-23

## 2021-10-23 PROBLEM — R77.8 ELEVATED TROPONIN: Status: ACTIVE | Noted: 2021-10-23

## 2021-10-23 PROBLEM — Z86.718 HISTORY OF DVT (DEEP VEIN THROMBOSIS): Status: ACTIVE | Noted: 2021-10-23

## 2021-10-23 LAB
ANION GAP SERPL CALCULATED.3IONS-SCNC: 8.6 MMOL/L (ref 5–15)
BUN SERPL-MCNC: 23 MG/DL (ref 8–23)
BUN/CREAT SERPL: 92 (ref 7–25)
CALCIUM SPEC-SCNC: 8.3 MG/DL (ref 8.2–9.6)
CHLORIDE SERPL-SCNC: 108 MMOL/L (ref 98–107)
CK SERPL-CCNC: 169 U/L (ref 20–180)
CO2 SERPL-SCNC: 24.4 MMOL/L (ref 22–29)
CREAT SERPL-MCNC: 0.25 MG/DL (ref 0.57–1)
DEPRECATED RDW RBC AUTO: 50.8 FL (ref 37–54)
ERYTHROCYTE [DISTWIDTH] IN BLOOD BY AUTOMATED COUNT: 15.6 % (ref 12.3–15.4)
GFR SERPL CREATININE-BSD FRML MDRD: >150 ML/MIN/1.73
GLUCOSE SERPL-MCNC: 82 MG/DL (ref 65–99)
HCT VFR BLD AUTO: 28.8 % (ref 34–46.6)
HGB BLD-MCNC: 9.4 G/DL (ref 12–15.9)
MCH RBC QN AUTO: 29.4 PG (ref 26.6–33)
MCHC RBC AUTO-ENTMCNC: 32.6 G/DL (ref 31.5–35.7)
MCV RBC AUTO: 90 FL (ref 79–97)
PLATELET # BLD AUTO: 286 10*3/MM3 (ref 140–450)
PMV BLD AUTO: 9 FL (ref 6–12)
POTASSIUM SERPL-SCNC: 3.3 MMOL/L (ref 3.5–5.2)
QT INTERVAL: 411 MS
RBC # BLD AUTO: 3.2 10*6/MM3 (ref 3.77–5.28)
SARS-COV-2 ORF1AB RESP QL NAA+PROBE: NOT DETECTED
SODIUM SERPL-SCNC: 141 MMOL/L (ref 136–145)
T3FREE SERPL-MCNC: 2.05 PG/ML (ref 2–4.4)
TROPONIN T SERPL-MCNC: 0.11 NG/ML (ref 0–0.03)
TROPONIN T SERPL-MCNC: 0.12 NG/ML (ref 0–0.03)
WBC # BLD AUTO: 5.05 10*3/MM3 (ref 3.4–10.8)

## 2021-10-23 PROCEDURE — 97162 PT EVAL MOD COMPLEX 30 MIN: CPT

## 2021-10-23 PROCEDURE — G0378 HOSPITAL OBSERVATION PER HR: HCPCS

## 2021-10-23 PROCEDURE — 84484 ASSAY OF TROPONIN QUANT: CPT | Performed by: NURSE PRACTITIONER

## 2021-10-23 PROCEDURE — 85027 COMPLETE CBC AUTOMATED: CPT | Performed by: NURSE PRACTITIONER

## 2021-10-23 PROCEDURE — U0004 COV-19 TEST NON-CDC HGH THRU: HCPCS | Performed by: INTERNAL MEDICINE

## 2021-10-23 PROCEDURE — 0 POTASSIUM CHLORIDE 10 MEQ/100ML SOLUTION: Performed by: STUDENT IN AN ORGANIZED HEALTH CARE EDUCATION/TRAINING PROGRAM

## 2021-10-23 PROCEDURE — 84481 FREE ASSAY (FT-3): CPT | Performed by: NURSE PRACTITIONER

## 2021-10-23 PROCEDURE — 82550 ASSAY OF CK (CPK): CPT | Performed by: NURSE PRACTITIONER

## 2021-10-23 PROCEDURE — 25010000003 POTASSIUM CHLORIDE 10 MEQ/100ML SOLUTION: Performed by: STUDENT IN AN ORGANIZED HEALTH CARE EDUCATION/TRAINING PROGRAM

## 2021-10-23 PROCEDURE — 80048 BASIC METABOLIC PNL TOTAL CA: CPT | Performed by: NURSE PRACTITIONER

## 2021-10-23 PROCEDURE — 25010000002 CEFTRIAXONE PER 250 MG: Performed by: NURSE PRACTITIONER

## 2021-10-23 PROCEDURE — 97530 THERAPEUTIC ACTIVITIES: CPT

## 2021-10-23 PROCEDURE — 36415 COLL VENOUS BLD VENIPUNCTURE: CPT | Performed by: NURSE PRACTITIONER

## 2021-10-23 RX ORDER — ASPIRIN 81 MG/1
81 TABLET ORAL DAILY
Status: DISCONTINUED | OUTPATIENT
Start: 2021-10-23 | End: 2021-11-05 | Stop reason: HOSPADM

## 2021-10-23 RX ORDER — AMOXICILLIN 250 MG
2 CAPSULE ORAL DAILY
Status: DISCONTINUED | OUTPATIENT
Start: 2021-10-23 | End: 2021-11-05 | Stop reason: HOSPADM

## 2021-10-23 RX ORDER — MAGNESIUM SULFATE HEPTAHYDRATE 40 MG/ML
4 INJECTION, SOLUTION INTRAVENOUS AS NEEDED
Status: DISCONTINUED | OUTPATIENT
Start: 2021-10-23 | End: 2021-11-05 | Stop reason: HOSPADM

## 2021-10-23 RX ORDER — POTASSIUM CHLORIDE 1.5 G/1.77G
40 POWDER, FOR SOLUTION ORAL AS NEEDED
Status: DISCONTINUED | OUTPATIENT
Start: 2021-10-23 | End: 2021-10-30

## 2021-10-23 RX ORDER — POTASSIUM CHLORIDE 750 MG/1
40 TABLET, FILM COATED, EXTENDED RELEASE ORAL AS NEEDED
Status: DISCONTINUED | OUTPATIENT
Start: 2021-10-23 | End: 2021-10-30

## 2021-10-23 RX ORDER — ONDANSETRON 4 MG/1
4 TABLET, FILM COATED ORAL EVERY 6 HOURS PRN
Status: DISCONTINUED | OUTPATIENT
Start: 2021-10-23 | End: 2021-11-05 | Stop reason: HOSPADM

## 2021-10-23 RX ORDER — FAMOTIDINE 20 MG/1
20 TABLET, FILM COATED ORAL DAILY
Status: DISCONTINUED | OUTPATIENT
Start: 2021-10-23 | End: 2021-10-30

## 2021-10-23 RX ORDER — GALANTAMINE HYDROBROMIDE 4 MG/1
8 TABLET, FILM COATED ORAL DAILY
Status: DISCONTINUED | OUTPATIENT
Start: 2021-10-23 | End: 2021-11-05 | Stop reason: HOSPADM

## 2021-10-23 RX ORDER — ACETAMINOPHEN 325 MG/1
650 TABLET ORAL EVERY 4 HOURS PRN
Status: DISCONTINUED | OUTPATIENT
Start: 2021-10-23 | End: 2021-11-05 | Stop reason: HOSPADM

## 2021-10-23 RX ORDER — ROSUVASTATIN CALCIUM 20 MG/1
20 TABLET, COATED ORAL NIGHTLY
Status: DISCONTINUED | OUTPATIENT
Start: 2021-10-23 | End: 2021-11-05 | Stop reason: HOSPADM

## 2021-10-23 RX ORDER — UREA 10 %
3 LOTION (ML) TOPICAL NIGHTLY PRN
Status: DISCONTINUED | OUTPATIENT
Start: 2021-10-23 | End: 2021-11-05 | Stop reason: HOSPADM

## 2021-10-23 RX ORDER — ONDANSETRON 2 MG/ML
4 INJECTION INTRAMUSCULAR; INTRAVENOUS EVERY 6 HOURS PRN
Status: DISCONTINUED | OUTPATIENT
Start: 2021-10-23 | End: 2021-11-05 | Stop reason: HOSPADM

## 2021-10-23 RX ORDER — SODIUM CHLORIDE 9 MG/ML
100 INJECTION, SOLUTION INTRAVENOUS CONTINUOUS
Status: DISCONTINUED | OUTPATIENT
Start: 2021-10-23 | End: 2021-10-28

## 2021-10-23 RX ORDER — ACETAMINOPHEN 160 MG/5ML
650 SOLUTION ORAL EVERY 4 HOURS PRN
Status: DISCONTINUED | OUTPATIENT
Start: 2021-10-23 | End: 2021-11-05 | Stop reason: HOSPADM

## 2021-10-23 RX ORDER — ACETAMINOPHEN 650 MG/1
650 SUPPOSITORY RECTAL EVERY 4 HOURS PRN
Status: DISCONTINUED | OUTPATIENT
Start: 2021-10-23 | End: 2021-11-05 | Stop reason: HOSPADM

## 2021-10-23 RX ORDER — TRAZODONE HYDROCHLORIDE 50 MG/1
50 TABLET ORAL NIGHTLY
Status: DISCONTINUED | OUTPATIENT
Start: 2021-10-23 | End: 2021-11-05 | Stop reason: HOSPADM

## 2021-10-23 RX ORDER — SODIUM CHLORIDE 0.9 % (FLUSH) 0.9 %
10 SYRINGE (ML) INJECTION AS NEEDED
Status: DISCONTINUED | OUTPATIENT
Start: 2021-10-23 | End: 2021-10-30

## 2021-10-23 RX ORDER — SODIUM CHLORIDE 0.9 % (FLUSH) 0.9 %
10 SYRINGE (ML) INJECTION EVERY 12 HOURS SCHEDULED
Status: DISCONTINUED | OUTPATIENT
Start: 2021-10-23 | End: 2021-11-05 | Stop reason: HOSPADM

## 2021-10-23 RX ORDER — POLYETHYLENE GLYCOL 3350 17 G/17G
17 POWDER, FOR SOLUTION ORAL DAILY PRN
Status: DISCONTINUED | OUTPATIENT
Start: 2021-10-23 | End: 2021-10-29

## 2021-10-23 RX ORDER — BISACODYL 10 MG
10 SUPPOSITORY, RECTAL RECTAL DAILY PRN
Status: DISCONTINUED | OUTPATIENT
Start: 2021-10-23 | End: 2021-11-05 | Stop reason: HOSPADM

## 2021-10-23 RX ORDER — MAGNESIUM SULFATE HEPTAHYDRATE 40 MG/ML
2 INJECTION, SOLUTION INTRAVENOUS AS NEEDED
Status: DISCONTINUED | OUTPATIENT
Start: 2021-10-23 | End: 2021-11-05 | Stop reason: HOSPADM

## 2021-10-23 RX ORDER — CALCIUM CARBONATE 200(500)MG
2 TABLET,CHEWABLE ORAL 2 TIMES DAILY PRN
Status: DISCONTINUED | OUTPATIENT
Start: 2021-10-23 | End: 2021-11-05 | Stop reason: HOSPADM

## 2021-10-23 RX ORDER — CHOLECALCIFEROL (VITAMIN D3) 125 MCG
1000 CAPSULE ORAL DAILY
Status: DISCONTINUED | OUTPATIENT
Start: 2021-10-23 | End: 2021-11-05 | Stop reason: HOSPADM

## 2021-10-23 RX ORDER — POTASSIUM CHLORIDE 7.45 MG/ML
10 INJECTION INTRAVENOUS
Status: DISCONTINUED | OUTPATIENT
Start: 2021-10-23 | End: 2021-10-30

## 2021-10-23 RX ADMIN — ACETAMINOPHEN 650 MG: 325 TABLET, FILM COATED ORAL at 06:20

## 2021-10-23 RX ADMIN — GALANTAMINE 8 MG: 4 TABLET, FILM COATED ORAL at 16:24

## 2021-10-23 RX ADMIN — POTASSIUM CHLORIDE 10 MEQ: 10 INJECTION, SOLUTION INTRAVENOUS at 19:53

## 2021-10-23 RX ADMIN — Medication 1000 MCG: at 16:24

## 2021-10-23 RX ADMIN — SODIUM CHLORIDE 150 ML/HR: 9 INJECTION, SOLUTION INTRAVENOUS at 23:35

## 2021-10-23 RX ADMIN — ROSUVASTATIN CALCIUM 20 MG: 20 TABLET, FILM COATED ORAL at 20:19

## 2021-10-23 RX ADMIN — CEFTRIAXONE 1 G: 1 INJECTION, POWDER, FOR SOLUTION INTRAMUSCULAR; INTRAVENOUS at 00:24

## 2021-10-23 RX ADMIN — FAMOTIDINE 20 MG: 20 TABLET, FILM COATED ORAL at 16:24

## 2021-10-23 RX ADMIN — APIXABAN 5 MG: 5 TABLET, FILM COATED ORAL at 20:19

## 2021-10-23 RX ADMIN — TRAZODONE HYDROCHLORIDE 50 MG: 50 TABLET ORAL at 20:19

## 2021-10-23 RX ADMIN — SODIUM CHLORIDE, PRESERVATIVE FREE 10 ML: 5 INJECTION INTRAVENOUS at 00:24

## 2021-10-23 RX ADMIN — ACETAMINOPHEN 650 MG: 325 TABLET, FILM COATED ORAL at 12:13

## 2021-10-23 RX ADMIN — POTASSIUM CHLORIDE 10 MEQ: 10 INJECTION, SOLUTION INTRAVENOUS at 18:33

## 2021-10-23 RX ADMIN — CEFTRIAXONE 1 G: 1 INJECTION, POWDER, FOR SOLUTION INTRAMUSCULAR; INTRAVENOUS at 23:35

## 2021-10-23 RX ADMIN — ASPIRIN 81 MG: 81 TABLET, COATED ORAL at 16:24

## 2021-10-23 RX ADMIN — ACETAMINOPHEN 650 MG: 325 TABLET, FILM COATED ORAL at 22:28

## 2021-10-23 RX ADMIN — POTASSIUM CHLORIDE 10 MEQ: 10 INJECTION, SOLUTION INTRAVENOUS at 15:03

## 2021-10-23 RX ADMIN — POTASSIUM CHLORIDE 10 MEQ: 10 INJECTION, SOLUTION INTRAVENOUS at 16:25

## 2021-10-23 RX ADMIN — DOCUSATE SODIUM 50MG AND SENNOSIDES 8.6MG 2 TABLET: 8.6; 5 TABLET, FILM COATED ORAL at 09:42

## 2021-10-23 RX ADMIN — SODIUM CHLORIDE 100 ML/HR: 9 INJECTION, SOLUTION INTRAVENOUS at 00:24

## 2021-10-23 RX ADMIN — APIXABAN 5 MG: 5 TABLET, FILM COATED ORAL at 16:24

## 2021-10-23 RX ADMIN — SODIUM CHLORIDE, PRESERVATIVE FREE 10 ML: 5 INJECTION INTRAVENOUS at 20:19

## 2021-10-23 RX ADMIN — SODIUM CHLORIDE 100 ML/HR: 9 INJECTION, SOLUTION INTRAVENOUS at 12:10

## 2021-10-23 RX ADMIN — ACETAMINOPHEN 650 MG: 325 TABLET, FILM COATED ORAL at 18:28

## 2021-10-24 LAB
ANION GAP SERPL CALCULATED.3IONS-SCNC: 7 MMOL/L (ref 5–15)
BASOPHILS # BLD AUTO: 0.04 10*3/MM3 (ref 0–0.2)
BASOPHILS NFR BLD AUTO: 0.7 % (ref 0–1.5)
BUN SERPL-MCNC: 15 MG/DL (ref 8–23)
BUN/CREAT SERPL: 55.6 (ref 7–25)
CALCIUM SPEC-SCNC: 8 MG/DL (ref 8.2–9.6)
CHLORIDE SERPL-SCNC: 112 MMOL/L (ref 98–107)
CO2 SERPL-SCNC: 23 MMOL/L (ref 22–29)
CREAT SERPL-MCNC: 0.27 MG/DL (ref 0.57–1)
DEPRECATED RDW RBC AUTO: 50.8 FL (ref 37–54)
EOSINOPHIL # BLD AUTO: 0.51 10*3/MM3 (ref 0–0.4)
EOSINOPHIL NFR BLD AUTO: 8.6 % (ref 0.3–6.2)
ERYTHROCYTE [DISTWIDTH] IN BLOOD BY AUTOMATED COUNT: 15.4 % (ref 12.3–15.4)
GFR SERPL CREATININE-BSD FRML MDRD: >150 ML/MIN/1.73
GLUCOSE SERPL-MCNC: 92 MG/DL (ref 65–99)
HCT VFR BLD AUTO: 28.7 % (ref 34–46.6)
HGB BLD-MCNC: 9.3 G/DL (ref 12–15.9)
IMM GRANULOCYTES # BLD AUTO: 0.03 10*3/MM3 (ref 0–0.05)
IMM GRANULOCYTES NFR BLD AUTO: 0.5 % (ref 0–0.5)
LYMPHOCYTES # BLD AUTO: 1.58 10*3/MM3 (ref 0.7–3.1)
LYMPHOCYTES NFR BLD AUTO: 26.8 % (ref 19.6–45.3)
MAGNESIUM SERPL-MCNC: 1.9 MG/DL (ref 1.7–2.3)
MCH RBC QN AUTO: 29.2 PG (ref 26.6–33)
MCHC RBC AUTO-ENTMCNC: 32.4 G/DL (ref 31.5–35.7)
MCV RBC AUTO: 90.3 FL (ref 79–97)
MONOCYTES # BLD AUTO: 0.46 10*3/MM3 (ref 0.1–0.9)
MONOCYTES NFR BLD AUTO: 7.8 % (ref 5–12)
NEUTROPHILS NFR BLD AUTO: 3.28 10*3/MM3 (ref 1.7–7)
NEUTROPHILS NFR BLD AUTO: 55.6 % (ref 42.7–76)
NRBC BLD AUTO-RTO: 0 /100 WBC (ref 0–0.2)
PHOSPHATE SERPL-MCNC: 2.3 MG/DL (ref 2.5–4.5)
PLATELET # BLD AUTO: 307 10*3/MM3 (ref 140–450)
PMV BLD AUTO: 9.3 FL (ref 6–12)
POTASSIUM SERPL-SCNC: 4 MMOL/L (ref 3.5–5.2)
RBC # BLD AUTO: 3.18 10*6/MM3 (ref 3.77–5.28)
SODIUM SERPL-SCNC: 142 MMOL/L (ref 136–145)
VIT B12 BLD-MCNC: 511 PG/ML (ref 211–946)
WBC # BLD AUTO: 5.9 10*3/MM3 (ref 3.4–10.8)

## 2021-10-24 PROCEDURE — 84100 ASSAY OF PHOSPHORUS: CPT | Performed by: STUDENT IN AN ORGANIZED HEALTH CARE EDUCATION/TRAINING PROGRAM

## 2021-10-24 PROCEDURE — 82607 VITAMIN B-12: CPT | Performed by: STUDENT IN AN ORGANIZED HEALTH CARE EDUCATION/TRAINING PROGRAM

## 2021-10-24 PROCEDURE — 80048 BASIC METABOLIC PNL TOTAL CA: CPT | Performed by: STUDENT IN AN ORGANIZED HEALTH CARE EDUCATION/TRAINING PROGRAM

## 2021-10-24 PROCEDURE — G0378 HOSPITAL OBSERVATION PER HR: HCPCS

## 2021-10-24 PROCEDURE — 83735 ASSAY OF MAGNESIUM: CPT | Performed by: STUDENT IN AN ORGANIZED HEALTH CARE EDUCATION/TRAINING PROGRAM

## 2021-10-24 PROCEDURE — 85025 COMPLETE CBC W/AUTO DIFF WBC: CPT | Performed by: STUDENT IN AN ORGANIZED HEALTH CARE EDUCATION/TRAINING PROGRAM

## 2021-10-24 RX ADMIN — DOCUSATE SODIUM 50MG AND SENNOSIDES 8.6MG 2 TABLET: 8.6; 5 TABLET, FILM COATED ORAL at 09:02

## 2021-10-24 RX ADMIN — ASPIRIN 81 MG: 81 TABLET, COATED ORAL at 09:02

## 2021-10-24 RX ADMIN — SODIUM CHLORIDE, PRESERVATIVE FREE 10 ML: 5 INJECTION INTRAVENOUS at 20:05

## 2021-10-24 RX ADMIN — ROSUVASTATIN CALCIUM 20 MG: 20 TABLET, FILM COATED ORAL at 20:05

## 2021-10-24 RX ADMIN — Medication 1000 MCG: at 09:03

## 2021-10-24 RX ADMIN — SODIUM CHLORIDE, PRESERVATIVE FREE 10 ML: 5 INJECTION INTRAVENOUS at 09:03

## 2021-10-24 RX ADMIN — APIXABAN 5 MG: 5 TABLET, FILM COATED ORAL at 20:05

## 2021-10-24 RX ADMIN — APIXABAN 5 MG: 5 TABLET, FILM COATED ORAL at 09:25

## 2021-10-24 RX ADMIN — FAMOTIDINE 20 MG: 20 TABLET, FILM COATED ORAL at 09:02

## 2021-10-24 RX ADMIN — GALANTAMINE 8 MG: 4 TABLET, FILM COATED ORAL at 09:03

## 2021-10-24 RX ADMIN — TRAZODONE HYDROCHLORIDE 50 MG: 50 TABLET ORAL at 20:05

## 2021-10-24 RX ADMIN — SODIUM CHLORIDE 150 ML/HR: 9 INJECTION, SOLUTION INTRAVENOUS at 06:50

## 2021-10-25 LAB
ANION GAP SERPL CALCULATED.3IONS-SCNC: 7.3 MMOL/L (ref 5–15)
BASOPHILS # BLD AUTO: 0.04 10*3/MM3 (ref 0–0.2)
BASOPHILS NFR BLD AUTO: 0.8 % (ref 0–1.5)
BUN SERPL-MCNC: 11 MG/DL (ref 8–23)
BUN/CREAT SERPL: 34.4 (ref 7–25)
CALCIUM SPEC-SCNC: 7.5 MG/DL (ref 8.2–9.6)
CHLORIDE SERPL-SCNC: 112 MMOL/L (ref 98–107)
CO2 SERPL-SCNC: 21.7 MMOL/L (ref 22–29)
CREAT SERPL-MCNC: 0.32 MG/DL (ref 0.57–1)
DEPRECATED RDW RBC AUTO: 52.7 FL (ref 37–54)
EOSINOPHIL # BLD AUTO: 0.32 10*3/MM3 (ref 0–0.4)
EOSINOPHIL NFR BLD AUTO: 6.7 % (ref 0.3–6.2)
ERYTHROCYTE [DISTWIDTH] IN BLOOD BY AUTOMATED COUNT: 15.7 % (ref 12.3–15.4)
GFR SERPL CREATININE-BSD FRML MDRD: >150 ML/MIN/1.73
GLUCOSE SERPL-MCNC: 85 MG/DL (ref 65–99)
HCT VFR BLD AUTO: 28.4 % (ref 34–46.6)
HGB BLD-MCNC: 9.2 G/DL (ref 12–15.9)
IMM GRANULOCYTES # BLD AUTO: 0.04 10*3/MM3 (ref 0–0.05)
IMM GRANULOCYTES NFR BLD AUTO: 0.8 % (ref 0–0.5)
LYMPHOCYTES # BLD AUTO: 1.44 10*3/MM3 (ref 0.7–3.1)
LYMPHOCYTES NFR BLD AUTO: 30.2 % (ref 19.6–45.3)
MAGNESIUM SERPL-MCNC: 1.8 MG/DL (ref 1.7–2.3)
MCH RBC QN AUTO: 29.8 PG (ref 26.6–33)
MCHC RBC AUTO-ENTMCNC: 32.4 G/DL (ref 31.5–35.7)
MCV RBC AUTO: 91.9 FL (ref 79–97)
MONOCYTES # BLD AUTO: 0.38 10*3/MM3 (ref 0.1–0.9)
MONOCYTES NFR BLD AUTO: 8 % (ref 5–12)
NEUTROPHILS NFR BLD AUTO: 2.55 10*3/MM3 (ref 1.7–7)
NEUTROPHILS NFR BLD AUTO: 53.5 % (ref 42.7–76)
NRBC BLD AUTO-RTO: 0 /100 WBC (ref 0–0.2)
PHOSPHATE SERPL-MCNC: 1.9 MG/DL (ref 2.5–4.5)
PLATELET # BLD AUTO: 293 10*3/MM3 (ref 140–450)
PMV BLD AUTO: 9 FL (ref 6–12)
POTASSIUM SERPL-SCNC: 3.3 MMOL/L (ref 3.5–5.2)
RBC # BLD AUTO: 3.09 10*6/MM3 (ref 3.77–5.28)
SODIUM SERPL-SCNC: 141 MMOL/L (ref 136–145)
WBC # BLD AUTO: 4.77 10*3/MM3 (ref 3.4–10.8)

## 2021-10-25 PROCEDURE — G0378 HOSPITAL OBSERVATION PER HR: HCPCS

## 2021-10-25 PROCEDURE — 25010000002 CEFTRIAXONE PER 250 MG: Performed by: NURSE PRACTITIONER

## 2021-10-25 PROCEDURE — 84100 ASSAY OF PHOSPHORUS: CPT | Performed by: STUDENT IN AN ORGANIZED HEALTH CARE EDUCATION/TRAINING PROGRAM

## 2021-10-25 PROCEDURE — 97166 OT EVAL MOD COMPLEX 45 MIN: CPT

## 2021-10-25 PROCEDURE — 85025 COMPLETE CBC W/AUTO DIFF WBC: CPT | Performed by: STUDENT IN AN ORGANIZED HEALTH CARE EDUCATION/TRAINING PROGRAM

## 2021-10-25 PROCEDURE — 83735 ASSAY OF MAGNESIUM: CPT | Performed by: STUDENT IN AN ORGANIZED HEALTH CARE EDUCATION/TRAINING PROGRAM

## 2021-10-25 PROCEDURE — 80048 BASIC METABOLIC PNL TOTAL CA: CPT | Performed by: STUDENT IN AN ORGANIZED HEALTH CARE EDUCATION/TRAINING PROGRAM

## 2021-10-25 PROCEDURE — 97535 SELF CARE MNGMENT TRAINING: CPT

## 2021-10-25 PROCEDURE — 97110 THERAPEUTIC EXERCISES: CPT

## 2021-10-25 RX ADMIN — TRAZODONE HYDROCHLORIDE 50 MG: 50 TABLET ORAL at 21:35

## 2021-10-25 RX ADMIN — ROSUVASTATIN CALCIUM 20 MG: 20 TABLET, FILM COATED ORAL at 21:35

## 2021-10-25 RX ADMIN — SODIUM CHLORIDE 150 ML/HR: 9 INJECTION, SOLUTION INTRAVENOUS at 04:58

## 2021-10-25 RX ADMIN — POTASSIUM PHOSPHATE, MONOBASIC POTASSIUM PHOSPHATE, DIBASIC 15 MMOL: 224; 236 INJECTION, SOLUTION, CONCENTRATE INTRAVENOUS at 14:28

## 2021-10-25 RX ADMIN — APIXABAN 5 MG: 5 TABLET, FILM COATED ORAL at 21:35

## 2021-10-25 RX ADMIN — POTASSIUM CHLORIDE 40 MEQ: 1.5 POWDER, FOR SOLUTION ORAL at 21:35

## 2021-10-25 RX ADMIN — ASPIRIN 81 MG: 81 TABLET, COATED ORAL at 08:54

## 2021-10-25 RX ADMIN — SODIUM CHLORIDE 150 ML/HR: 9 INJECTION, SOLUTION INTRAVENOUS at 12:17

## 2021-10-25 RX ADMIN — GALANTAMINE 8 MG: 4 TABLET, FILM COATED ORAL at 08:54

## 2021-10-25 RX ADMIN — DOCUSATE SODIUM 50MG AND SENNOSIDES 8.6MG 2 TABLET: 8.6; 5 TABLET, FILM COATED ORAL at 08:53

## 2021-10-25 RX ADMIN — Medication 1000 MCG: at 08:54

## 2021-10-25 RX ADMIN — APIXABAN 5 MG: 5 TABLET, FILM COATED ORAL at 08:54

## 2021-10-25 RX ADMIN — FAMOTIDINE 20 MG: 20 TABLET, FILM COATED ORAL at 08:54

## 2021-10-25 RX ADMIN — CEFTRIAXONE 1 G: 1 INJECTION, POWDER, FOR SOLUTION INTRAMUSCULAR; INTRAVENOUS at 00:31

## 2021-10-25 RX ADMIN — Medication 3 MG: at 21:35

## 2021-10-26 ENCOUNTER — APPOINTMENT (OUTPATIENT)
Dept: GENERAL RADIOLOGY | Facility: HOSPITAL | Age: 86
End: 2021-10-26

## 2021-10-26 PROBLEM — R13.10 DYSPHAGIA: Status: ACTIVE | Noted: 2021-10-26

## 2021-10-26 LAB
ALBUMIN SERPL-MCNC: 2.2 G/DL (ref 3.5–5.2)
ALBUMIN/GLOB SERPL: 0.9 G/DL
ALP SERPL-CCNC: 65 U/L (ref 39–117)
ALT SERPL W P-5'-P-CCNC: 8 U/L (ref 1–33)
ANION GAP SERPL CALCULATED.3IONS-SCNC: 9.6 MMOL/L (ref 5–15)
AST SERPL-CCNC: 11 U/L (ref 1–32)
BACTERIA SPEC AEROBE CULT: ABNORMAL
BASOPHILS # BLD AUTO: 0.04 10*3/MM3 (ref 0–0.2)
BASOPHILS NFR BLD AUTO: 0.7 % (ref 0–1.5)
BILIRUB SERPL-MCNC: 0.2 MG/DL (ref 0–1.2)
BUN SERPL-MCNC: 9 MG/DL (ref 8–23)
BUN/CREAT SERPL: 31 (ref 7–25)
CALCIUM SPEC-SCNC: 7.3 MG/DL (ref 8.2–9.6)
CHLORIDE SERPL-SCNC: 113 MMOL/L (ref 98–107)
CO2 SERPL-SCNC: 20.4 MMOL/L (ref 22–29)
CREAT SERPL-MCNC: 0.29 MG/DL (ref 0.57–1)
DEPRECATED RDW RBC AUTO: 53.7 FL (ref 37–54)
EOSINOPHIL # BLD AUTO: 0.4 10*3/MM3 (ref 0–0.4)
EOSINOPHIL NFR BLD AUTO: 7.4 % (ref 0.3–6.2)
ERYTHROCYTE [DISTWIDTH] IN BLOOD BY AUTOMATED COUNT: 15.7 % (ref 12.3–15.4)
GFR SERPL CREATININE-BSD FRML MDRD: >150 ML/MIN/1.73
GLOBULIN UR ELPH-MCNC: 2.5 GM/DL
GLUCOSE SERPL-MCNC: 88 MG/DL (ref 65–99)
HCT VFR BLD AUTO: 29 % (ref 34–46.6)
HGB BLD-MCNC: 9.1 G/DL (ref 12–15.9)
IMM GRANULOCYTES # BLD AUTO: 0.06 10*3/MM3 (ref 0–0.05)
IMM GRANULOCYTES NFR BLD AUTO: 1.1 % (ref 0–0.5)
LYMPHOCYTES # BLD AUTO: 1.48 10*3/MM3 (ref 0.7–3.1)
LYMPHOCYTES NFR BLD AUTO: 27.5 % (ref 19.6–45.3)
MAGNESIUM SERPL-MCNC: 1.8 MG/DL (ref 1.7–2.3)
MCH RBC QN AUTO: 29.3 PG (ref 26.6–33)
MCHC RBC AUTO-ENTMCNC: 31.4 G/DL (ref 31.5–35.7)
MCV RBC AUTO: 93.2 FL (ref 79–97)
MONOCYTES # BLD AUTO: 0.44 10*3/MM3 (ref 0.1–0.9)
MONOCYTES NFR BLD AUTO: 8.2 % (ref 5–12)
NEUTROPHILS NFR BLD AUTO: 2.96 10*3/MM3 (ref 1.7–7)
NEUTROPHILS NFR BLD AUTO: 55.1 % (ref 42.7–76)
NRBC BLD AUTO-RTO: 0 /100 WBC (ref 0–0.2)
PHOSPHATE SERPL-MCNC: 1.8 MG/DL (ref 2.5–4.5)
PHOSPHATE SERPL-MCNC: 2.1 MG/DL (ref 2.5–4.5)
PLATELET # BLD AUTO: 307 10*3/MM3 (ref 140–450)
PMV BLD AUTO: 9.2 FL (ref 6–12)
POTASSIUM SERPL-SCNC: 4.1 MMOL/L (ref 3.5–5.2)
PROT SERPL-MCNC: 4.7 G/DL (ref 6–8.5)
RBC # BLD AUTO: 3.11 10*6/MM3 (ref 3.77–5.28)
SODIUM SERPL-SCNC: 143 MMOL/L (ref 136–145)
WBC # BLD AUTO: 5.38 10*3/MM3 (ref 3.4–10.8)

## 2021-10-26 PROCEDURE — 92610 EVALUATE SWALLOWING FUNCTION: CPT | Performed by: SPEECH-LANGUAGE PATHOLOGIST

## 2021-10-26 PROCEDURE — 25010000002 CEFTRIAXONE PER 250 MG: Performed by: NURSE PRACTITIONER

## 2021-10-26 PROCEDURE — 97110 THERAPEUTIC EXERCISES: CPT

## 2021-10-26 PROCEDURE — 92611 MOTION FLUOROSCOPY/SWALLOW: CPT | Performed by: SPEECH-LANGUAGE PATHOLOGIST

## 2021-10-26 PROCEDURE — 84100 ASSAY OF PHOSPHORUS: CPT | Performed by: HOSPITALIST

## 2021-10-26 PROCEDURE — 99203 OFFICE O/P NEW LOW 30 MIN: CPT | Performed by: PHYSICIAN ASSISTANT

## 2021-10-26 PROCEDURE — 90791 PSYCH DIAGNOSTIC EVALUATION: CPT | Performed by: SOCIAL WORKER

## 2021-10-26 PROCEDURE — 74018 RADEX ABDOMEN 1 VIEW: CPT

## 2021-10-26 PROCEDURE — G0378 HOSPITAL OBSERVATION PER HR: HCPCS

## 2021-10-26 PROCEDURE — 85025 COMPLETE CBC W/AUTO DIFF WBC: CPT | Performed by: HOSPITALIST

## 2021-10-26 PROCEDURE — 80053 COMPREHEN METABOLIC PANEL: CPT | Performed by: HOSPITALIST

## 2021-10-26 PROCEDURE — 83735 ASSAY OF MAGNESIUM: CPT | Performed by: HOSPITALIST

## 2021-10-26 PROCEDURE — 92526 ORAL FUNCTION THERAPY: CPT | Performed by: SPEECH-LANGUAGE PATHOLOGIST

## 2021-10-26 PROCEDURE — 0 MAGNESIUM SULFATE 4 GM/100ML SOLUTION: Performed by: STUDENT IN AN ORGANIZED HEALTH CARE EDUCATION/TRAINING PROGRAM

## 2021-10-26 RX ORDER — HYDROXYZINE HYDROCHLORIDE 10 MG/1
10 TABLET, FILM COATED ORAL 3 TIMES DAILY PRN
Status: DISCONTINUED | OUTPATIENT
Start: 2021-10-26 | End: 2021-11-05 | Stop reason: HOSPADM

## 2021-10-26 RX ADMIN — SODIUM CHLORIDE 150 ML/HR: 9 INJECTION, SOLUTION INTRAVENOUS at 01:33

## 2021-10-26 RX ADMIN — ROSUVASTATIN CALCIUM 20 MG: 20 TABLET, FILM COATED ORAL at 21:38

## 2021-10-26 RX ADMIN — DOCUSATE SODIUM 50MG AND SENNOSIDES 8.6MG 2 TABLET: 8.6; 5 TABLET, FILM COATED ORAL at 08:08

## 2021-10-26 RX ADMIN — CEFTRIAXONE 1 G: 1 INJECTION, POWDER, FOR SOLUTION INTRAMUSCULAR; INTRAVENOUS at 00:37

## 2021-10-26 RX ADMIN — SODIUM CHLORIDE 100 ML/HR: 9 INJECTION, SOLUTION INTRAVENOUS at 14:37

## 2021-10-26 RX ADMIN — ASPIRIN 81 MG: 81 TABLET, COATED ORAL at 08:08

## 2021-10-26 RX ADMIN — FAMOTIDINE 20 MG: 20 TABLET, FILM COATED ORAL at 08:08

## 2021-10-26 RX ADMIN — Medication 3 MG: at 21:38

## 2021-10-26 RX ADMIN — HYDROXYZINE HYDROCHLORIDE 10 MG: 10 TABLET ORAL at 16:34

## 2021-10-26 RX ADMIN — SODIUM CHLORIDE, PRESERVATIVE FREE 10 ML: 5 INJECTION INTRAVENOUS at 21:39

## 2021-10-26 RX ADMIN — MAGNESIUM SULFATE HEPTAHYDRATE 4 G: 40 INJECTION, SOLUTION INTRAVENOUS at 16:45

## 2021-10-26 RX ADMIN — Medication 1000 MCG: at 08:10

## 2021-10-26 RX ADMIN — SODIUM CHLORIDE 150 ML/HR: 9 INJECTION, SOLUTION INTRAVENOUS at 06:53

## 2021-10-26 RX ADMIN — APIXABAN 5 MG: 5 TABLET, FILM COATED ORAL at 21:38

## 2021-10-26 RX ADMIN — TRAZODONE HYDROCHLORIDE 50 MG: 50 TABLET ORAL at 21:38

## 2021-10-26 RX ADMIN — Medication 2 PACKET: at 15:47

## 2021-10-26 RX ADMIN — GALANTAMINE 8 MG: 4 TABLET, FILM COATED ORAL at 08:09

## 2021-10-26 RX ADMIN — APIXABAN 5 MG: 5 TABLET, FILM COATED ORAL at 08:09

## 2021-10-27 PROBLEM — K44.9 HIATAL HERNIA: Status: ACTIVE | Noted: 2021-10-27

## 2021-10-27 PROBLEM — R11.10 POSTPRANDIAL VOMITING: Status: ACTIVE | Noted: 2021-10-27

## 2021-10-27 PROBLEM — E87.6 HYPOKALEMIA: Status: ACTIVE | Noted: 2021-10-27

## 2021-10-27 LAB
ANION GAP SERPL CALCULATED.3IONS-SCNC: 6.7 MMOL/L (ref 5–15)
BUN SERPL-MCNC: 5 MG/DL (ref 8–23)
BUN/CREAT SERPL: 17.9 (ref 7–25)
CALCIUM SPEC-SCNC: 7.4 MG/DL (ref 8.2–9.6)
CHLORIDE SERPL-SCNC: 110 MMOL/L (ref 98–107)
CO2 SERPL-SCNC: 24.3 MMOL/L (ref 22–29)
CREAT SERPL-MCNC: 0.28 MG/DL (ref 0.57–1)
GFR SERPL CREATININE-BSD FRML MDRD: >150 ML/MIN/1.73
GLUCOSE SERPL-MCNC: 93 MG/DL (ref 65–99)
MAGNESIUM SERPL-MCNC: 2.2 MG/DL (ref 1.7–2.3)
PHOSPHATE SERPL-MCNC: 2 MG/DL (ref 2.5–4.5)
POTASSIUM SERPL-SCNC: 3.3 MMOL/L (ref 3.5–5.2)
POTASSIUM SERPL-SCNC: 4.1 MMOL/L (ref 3.5–5.2)
SODIUM SERPL-SCNC: 141 MMOL/L (ref 136–145)

## 2021-10-27 PROCEDURE — 80048 BASIC METABOLIC PNL TOTAL CA: CPT | Performed by: HOSPITALIST

## 2021-10-27 PROCEDURE — 83735 ASSAY OF MAGNESIUM: CPT | Performed by: HOSPITALIST

## 2021-10-27 PROCEDURE — 99232 SBSQ HOSP IP/OBS MODERATE 35: CPT | Performed by: INTERNAL MEDICINE

## 2021-10-27 PROCEDURE — 84100 ASSAY OF PHOSPHORUS: CPT | Performed by: HOSPITALIST

## 2021-10-27 PROCEDURE — 25010000002 CEFTRIAXONE PER 250 MG: Performed by: NURSE PRACTITIONER

## 2021-10-27 PROCEDURE — 84132 ASSAY OF SERUM POTASSIUM: CPT | Performed by: HOSPITALIST

## 2021-10-27 RX ADMIN — FAMOTIDINE 20 MG: 20 TABLET, FILM COATED ORAL at 08:47

## 2021-10-27 RX ADMIN — CEFTRIAXONE 1 G: 1 INJECTION, POWDER, FOR SOLUTION INTRAMUSCULAR; INTRAVENOUS at 01:09

## 2021-10-27 RX ADMIN — Medication 1000 MCG: at 08:47

## 2021-10-27 RX ADMIN — SODIUM CHLORIDE, PRESERVATIVE FREE 10 ML: 5 INJECTION INTRAVENOUS at 21:18

## 2021-10-27 RX ADMIN — APIXABAN 5 MG: 5 TABLET, FILM COATED ORAL at 08:47

## 2021-10-27 RX ADMIN — HYDROXYZINE HYDROCHLORIDE 10 MG: 10 TABLET ORAL at 18:46

## 2021-10-27 RX ADMIN — POTASSIUM CHLORIDE 40 MEQ: 750 TABLET, EXTENDED RELEASE ORAL at 12:09

## 2021-10-27 RX ADMIN — GALANTAMINE 8 MG: 4 TABLET, FILM COATED ORAL at 08:47

## 2021-10-27 RX ADMIN — Medication 3 MG: at 21:14

## 2021-10-27 RX ADMIN — ASPIRIN 81 MG: 81 TABLET, COATED ORAL at 08:47

## 2021-10-27 RX ADMIN — SODIUM PHOSPHATE, MONOBASIC, MONOHYDRATE AND SODIUM PHOSPHATE, DIBASIC, ANHYDROUS 20 MMOL: 276; 142 INJECTION, SOLUTION INTRAVENOUS at 03:56

## 2021-10-27 RX ADMIN — APIXABAN 5 MG: 5 TABLET, FILM COATED ORAL at 21:14

## 2021-10-27 RX ADMIN — ROSUVASTATIN CALCIUM 20 MG: 20 TABLET, FILM COATED ORAL at 21:14

## 2021-10-27 RX ADMIN — DOCUSATE SODIUM 50MG AND SENNOSIDES 8.6MG 2 TABLET: 8.6; 5 TABLET, FILM COATED ORAL at 08:47

## 2021-10-27 RX ADMIN — COLLAGENASE SANTYL 1 APPLICATION: 250 OINTMENT TOPICAL at 16:11

## 2021-10-27 RX ADMIN — POTASSIUM CHLORIDE 40 MEQ: 750 TABLET, EXTENDED RELEASE ORAL at 17:33

## 2021-10-27 RX ADMIN — TRAZODONE HYDROCHLORIDE 50 MG: 50 TABLET ORAL at 21:14

## 2021-10-28 LAB
ANION GAP SERPL CALCULATED.3IONS-SCNC: 7 MMOL/L (ref 5–15)
BUN SERPL-MCNC: 2 MG/DL (ref 8–23)
BUN/CREAT SERPL: ABNORMAL
CALCIUM SPEC-SCNC: 7.3 MG/DL (ref 8.2–9.6)
CHLORIDE SERPL-SCNC: 111 MMOL/L (ref 98–107)
CO2 SERPL-SCNC: 21 MMOL/L (ref 22–29)
CREAT SERPL-MCNC: <0.17 MG/DL (ref 0.57–1)
GLUCOSE SERPL-MCNC: 86 MG/DL (ref 65–99)
MAGNESIUM SERPL-MCNC: 2 MG/DL (ref 1.7–2.3)
PHOSPHATE SERPL-MCNC: 2.3 MG/DL (ref 2.5–4.5)
POTASSIUM SERPL-SCNC: 4.2 MMOL/L (ref 3.5–5.2)
SODIUM SERPL-SCNC: 139 MMOL/L (ref 136–145)

## 2021-10-28 PROCEDURE — 83735 ASSAY OF MAGNESIUM: CPT | Performed by: HOSPITALIST

## 2021-10-28 PROCEDURE — 99232 SBSQ HOSP IP/OBS MODERATE 35: CPT | Performed by: INTERNAL MEDICINE

## 2021-10-28 PROCEDURE — 80048 BASIC METABOLIC PNL TOTAL CA: CPT | Performed by: NURSE PRACTITIONER

## 2021-10-28 PROCEDURE — 97530 THERAPEUTIC ACTIVITIES: CPT

## 2021-10-28 PROCEDURE — 84100 ASSAY OF PHOSPHORUS: CPT | Performed by: HOSPITALIST

## 2021-10-28 RX ADMIN — COLLAGENASE SANTYL 1 APPLICATION: 250 OINTMENT TOPICAL at 07:38

## 2021-10-28 RX ADMIN — HYDROXYZINE HYDROCHLORIDE 10 MG: 10 TABLET ORAL at 20:25

## 2021-10-28 RX ADMIN — Medication 1000 MCG: at 07:50

## 2021-10-28 RX ADMIN — GALANTAMINE 8 MG: 4 TABLET, FILM COATED ORAL at 07:49

## 2021-10-28 RX ADMIN — ROSUVASTATIN CALCIUM 20 MG: 20 TABLET, FILM COATED ORAL at 20:23

## 2021-10-28 RX ADMIN — SODIUM CHLORIDE, PRESERVATIVE FREE 10 ML: 5 INJECTION INTRAVENOUS at 20:24

## 2021-10-28 RX ADMIN — FAMOTIDINE 20 MG: 20 TABLET, FILM COATED ORAL at 07:54

## 2021-10-28 RX ADMIN — APIXABAN 5 MG: 5 TABLET, FILM COATED ORAL at 20:24

## 2021-10-28 RX ADMIN — HYDROXYZINE HYDROCHLORIDE 10 MG: 10 TABLET ORAL at 07:51

## 2021-10-28 RX ADMIN — TRAZODONE HYDROCHLORIDE 50 MG: 50 TABLET ORAL at 20:24

## 2021-10-28 RX ADMIN — ASPIRIN 81 MG: 81 TABLET, COATED ORAL at 07:55

## 2021-10-28 RX ADMIN — APIXABAN 5 MG: 5 TABLET, FILM COATED ORAL at 07:48

## 2021-10-28 RX ADMIN — SODIUM PHOSPHATE, MONOBASIC, MONOHYDRATE AND SODIUM PHOSPHATE, DIBASIC, ANHYDROUS 20 MMOL: 276; 142 INJECTION, SOLUTION INTRAVENOUS at 05:02

## 2021-10-28 RX ADMIN — Medication 3 MG: at 20:23

## 2021-10-29 ENCOUNTER — APPOINTMENT (OUTPATIENT)
Dept: CT IMAGING | Facility: HOSPITAL | Age: 86
End: 2021-10-29

## 2021-10-29 LAB
ANION GAP SERPL CALCULATED.3IONS-SCNC: 5.4 MMOL/L (ref 5–15)
BUN SERPL-MCNC: 3 MG/DL (ref 8–23)
BUN/CREAT SERPL: 14.3 (ref 7–25)
CALCIUM SPEC-SCNC: 7.8 MG/DL (ref 8.2–9.6)
CHLORIDE SERPL-SCNC: 110 MMOL/L (ref 98–107)
CO2 SERPL-SCNC: 28.6 MMOL/L (ref 22–29)
CREAT SERPL-MCNC: 0.21 MG/DL (ref 0.57–1)
GFR SERPL CREATININE-BSD FRML MDRD: >150 ML/MIN/1.73
GLUCOSE SERPL-MCNC: 86 MG/DL (ref 65–99)
MAGNESIUM SERPL-MCNC: 1.9 MG/DL (ref 1.7–2.3)
NT-PROBNP SERPL-MCNC: 5139 PG/ML (ref 0–1800)
POTASSIUM SERPL-SCNC: 3.7 MMOL/L (ref 3.5–5.2)
QT INTERVAL: 429 MS
SODIUM SERPL-SCNC: 144 MMOL/L (ref 136–145)
TROPONIN T SERPL-MCNC: 0.04 NG/ML (ref 0–0.03)
TROPONIN T SERPL-MCNC: 0.04 NG/ML (ref 0–0.03)

## 2021-10-29 PROCEDURE — 25010000002 IOPAMIDOL 61 % SOLUTION: Performed by: HOSPITALIST

## 2021-10-29 PROCEDURE — 99232 SBSQ HOSP IP/OBS MODERATE 35: CPT | Performed by: PHYSICIAN ASSISTANT

## 2021-10-29 PROCEDURE — 93005 ELECTROCARDIOGRAM TRACING: CPT | Performed by: NURSE PRACTITIONER

## 2021-10-29 PROCEDURE — 83880 ASSAY OF NATRIURETIC PEPTIDE: CPT | Performed by: NURSE PRACTITIONER

## 2021-10-29 PROCEDURE — 74178 CT ABD&PLV WO CNTR FLWD CNTR: CPT

## 2021-10-29 PROCEDURE — 84484 ASSAY OF TROPONIN QUANT: CPT | Performed by: HOSPITALIST

## 2021-10-29 PROCEDURE — 84484 ASSAY OF TROPONIN QUANT: CPT | Performed by: NURSE PRACTITIONER

## 2021-10-29 PROCEDURE — 80048 BASIC METABOLIC PNL TOTAL CA: CPT | Performed by: HOSPITALIST

## 2021-10-29 PROCEDURE — 97530 THERAPEUTIC ACTIVITIES: CPT

## 2021-10-29 PROCEDURE — 83735 ASSAY OF MAGNESIUM: CPT | Performed by: HOSPITALIST

## 2021-10-29 PROCEDURE — 93010 ELECTROCARDIOGRAM REPORT: CPT | Performed by: INTERNAL MEDICINE

## 2021-10-29 RX ORDER — POLYETHYLENE GLYCOL 3350 17 G/17G
17 POWDER, FOR SOLUTION ORAL DAILY
Status: DISCONTINUED | OUTPATIENT
Start: 2021-10-29 | End: 2021-11-05 | Stop reason: HOSPADM

## 2021-10-29 RX ORDER — BISACODYL 10 MG
10 SUPPOSITORY, RECTAL RECTAL ONCE
Status: COMPLETED | OUTPATIENT
Start: 2021-10-29 | End: 2021-10-29

## 2021-10-29 RX ADMIN — APIXABAN 5 MG: 5 TABLET, FILM COATED ORAL at 13:49

## 2021-10-29 RX ADMIN — BISACODYL 10 MG: 10 SUPPOSITORY RECTAL at 13:51

## 2021-10-29 RX ADMIN — APIXABAN 5 MG: 5 TABLET, FILM COATED ORAL at 20:28

## 2021-10-29 RX ADMIN — HYDROXYZINE HYDROCHLORIDE 10 MG: 10 TABLET ORAL at 19:13

## 2021-10-29 RX ADMIN — FAMOTIDINE 20 MG: 20 TABLET, FILM COATED ORAL at 13:51

## 2021-10-29 RX ADMIN — IOPAMIDOL 85 ML: 612 INJECTION, SOLUTION INTRAVENOUS at 11:04

## 2021-10-29 RX ADMIN — GALANTAMINE 8 MG: 4 TABLET, FILM COATED ORAL at 13:50

## 2021-10-29 RX ADMIN — POLYETHYLENE GLYCOL 3350 17 G: 17 POWDER, FOR SOLUTION ORAL at 13:50

## 2021-10-29 RX ADMIN — Medication 1000 MCG: at 13:49

## 2021-10-29 RX ADMIN — ASPIRIN 81 MG: 81 TABLET, COATED ORAL at 13:51

## 2021-10-29 RX ADMIN — Medication 2 PACKET: at 13:53

## 2021-10-29 RX ADMIN — SODIUM CHLORIDE, PRESERVATIVE FREE 10 ML: 5 INJECTION INTRAVENOUS at 20:28

## 2021-10-29 RX ADMIN — COLLAGENASE SANTYL 1 APPLICATION: 250 OINTMENT TOPICAL at 13:52

## 2021-10-29 RX ADMIN — DOCUSATE SODIUM 50MG AND SENNOSIDES 8.6MG 2 TABLET: 8.6; 5 TABLET, FILM COATED ORAL at 13:50

## 2021-10-29 RX ADMIN — ROSUVASTATIN CALCIUM 20 MG: 20 TABLET, FILM COATED ORAL at 20:28

## 2021-10-29 RX ADMIN — TRAZODONE HYDROCHLORIDE 50 MG: 50 TABLET ORAL at 20:28

## 2021-10-30 LAB
ANION GAP SERPL CALCULATED.3IONS-SCNC: 7.4 MMOL/L (ref 5–15)
BUN SERPL-MCNC: 3 MG/DL (ref 8–23)
BUN/CREAT SERPL: 10 (ref 7–25)
CALCIUM SPEC-SCNC: 8 MG/DL (ref 8.2–9.6)
CHLORIDE SERPL-SCNC: 105 MMOL/L (ref 98–107)
CO2 SERPL-SCNC: 27.6 MMOL/L (ref 22–29)
CREAT SERPL-MCNC: 0.3 MG/DL (ref 0.57–1)
DEPRECATED RDW RBC AUTO: 52.7 FL (ref 37–54)
ERYTHROCYTE [DISTWIDTH] IN BLOOD BY AUTOMATED COUNT: 15.6 % (ref 12.3–15.4)
GFR SERPL CREATININE-BSD FRML MDRD: >150 ML/MIN/1.73
GLUCOSE SERPL-MCNC: 104 MG/DL (ref 65–99)
HCT VFR BLD AUTO: 31.6 % (ref 34–46.6)
HGB BLD-MCNC: 9.8 G/DL (ref 12–15.9)
MCH RBC QN AUTO: 28.4 PG (ref 26.6–33)
MCHC RBC AUTO-ENTMCNC: 31 G/DL (ref 31.5–35.7)
MCV RBC AUTO: 91.6 FL (ref 79–97)
PLATELET # BLD AUTO: 374 10*3/MM3 (ref 140–450)
PMV BLD AUTO: 8.9 FL (ref 6–12)
POTASSIUM SERPL-SCNC: 3.6 MMOL/L (ref 3.5–5.2)
RBC # BLD AUTO: 3.45 10*6/MM3 (ref 3.77–5.28)
SODIUM SERPL-SCNC: 140 MMOL/L (ref 136–145)
WBC # BLD AUTO: 8.29 10*3/MM3 (ref 3.4–10.8)

## 2021-10-30 PROCEDURE — 80048 BASIC METABOLIC PNL TOTAL CA: CPT | Performed by: NURSE PRACTITIONER

## 2021-10-30 PROCEDURE — 97530 THERAPEUTIC ACTIVITIES: CPT

## 2021-10-30 PROCEDURE — 85027 COMPLETE CBC AUTOMATED: CPT | Performed by: NURSE PRACTITIONER

## 2021-10-30 PROCEDURE — 99232 SBSQ HOSP IP/OBS MODERATE 35: CPT | Performed by: INTERNAL MEDICINE

## 2021-10-30 RX ORDER — BISACODYL 10 MG
10 SUPPOSITORY, RECTAL RECTAL DAILY
Status: DISCONTINUED | OUTPATIENT
Start: 2021-10-30 | End: 2021-11-01

## 2021-10-30 RX ORDER — PANTOPRAZOLE SODIUM 40 MG/1
40 TABLET, DELAYED RELEASE ORAL
Status: DISCONTINUED | OUTPATIENT
Start: 2021-10-30 | End: 2021-11-01

## 2021-10-30 RX ADMIN — Medication 1000 MCG: at 10:27

## 2021-10-30 RX ADMIN — SODIUM CHLORIDE, PRESERVATIVE FREE 10 ML: 5 INJECTION INTRAVENOUS at 20:10

## 2021-10-30 RX ADMIN — BISACODYL 10 MG: 10 SUPPOSITORY RECTAL at 14:53

## 2021-10-30 RX ADMIN — APIXABAN 5 MG: 5 TABLET, FILM COATED ORAL at 10:27

## 2021-10-30 RX ADMIN — POLYETHYLENE GLYCOL 3350 17 G: 17 POWDER, FOR SOLUTION ORAL at 10:26

## 2021-10-30 RX ADMIN — TRAZODONE HYDROCHLORIDE 50 MG: 50 TABLET ORAL at 20:10

## 2021-10-30 RX ADMIN — PANTOPRAZOLE SODIUM 40 MG: 40 TABLET, DELAYED RELEASE ORAL at 18:31

## 2021-10-30 RX ADMIN — FAMOTIDINE 20 MG: 20 TABLET, FILM COATED ORAL at 10:26

## 2021-10-30 RX ADMIN — ACETAMINOPHEN 650 MG: 325 TABLET, FILM COATED ORAL at 10:26

## 2021-10-30 RX ADMIN — DOCUSATE SODIUM 50MG AND SENNOSIDES 8.6MG 2 TABLET: 8.6; 5 TABLET, FILM COATED ORAL at 10:26

## 2021-10-30 RX ADMIN — COLLAGENASE SANTYL 1 APPLICATION: 250 OINTMENT TOPICAL at 10:25

## 2021-10-30 RX ADMIN — HYDROXYZINE HYDROCHLORIDE 10 MG: 10 TABLET ORAL at 04:53

## 2021-10-30 RX ADMIN — HYDROXYZINE HYDROCHLORIDE 10 MG: 10 TABLET ORAL at 14:53

## 2021-10-30 RX ADMIN — ROSUVASTATIN CALCIUM 20 MG: 20 TABLET, FILM COATED ORAL at 20:10

## 2021-10-30 RX ADMIN — GALANTAMINE 8 MG: 4 TABLET, FILM COATED ORAL at 10:27

## 2021-10-30 RX ADMIN — ASPIRIN 81 MG: 81 TABLET, COATED ORAL at 10:26

## 2021-10-30 RX ADMIN — APIXABAN 5 MG: 5 TABLET, FILM COATED ORAL at 20:10

## 2021-10-31 LAB
ANION GAP SERPL CALCULATED.3IONS-SCNC: 4.9 MMOL/L (ref 5–15)
BASOPHILS # BLD AUTO: 0.04 10*3/MM3 (ref 0–0.2)
BASOPHILS NFR BLD AUTO: 0.6 % (ref 0–1.5)
BUN SERPL-MCNC: 2 MG/DL (ref 8–23)
BUN/CREAT SERPL: 6.9 (ref 7–25)
CALCIUM SPEC-SCNC: 7.7 MG/DL (ref 8.2–9.6)
CHLORIDE SERPL-SCNC: 108 MMOL/L (ref 98–107)
CO2 SERPL-SCNC: 29.1 MMOL/L (ref 22–29)
CREAT SERPL-MCNC: 0.29 MG/DL (ref 0.57–1)
DEPRECATED RDW RBC AUTO: 49.8 FL (ref 37–54)
EOSINOPHIL # BLD AUTO: 0.46 10*3/MM3 (ref 0–0.4)
EOSINOPHIL NFR BLD AUTO: 7.5 % (ref 0.3–6.2)
ERYTHROCYTE [DISTWIDTH] IN BLOOD BY AUTOMATED COUNT: 15.5 % (ref 12.3–15.4)
GFR SERPL CREATININE-BSD FRML MDRD: >150 ML/MIN/1.73
GLUCOSE SERPL-MCNC: 83 MG/DL (ref 65–99)
HCT VFR BLD AUTO: 27.4 % (ref 34–46.6)
HGB BLD-MCNC: 8.8 G/DL (ref 12–15.9)
IMM GRANULOCYTES # BLD AUTO: 0.04 10*3/MM3 (ref 0–0.05)
IMM GRANULOCYTES NFR BLD AUTO: 0.6 % (ref 0–0.5)
LYMPHOCYTES # BLD AUTO: 1.81 10*3/MM3 (ref 0.7–3.1)
LYMPHOCYTES NFR BLD AUTO: 29.4 % (ref 19.6–45.3)
MCH RBC QN AUTO: 28.3 PG (ref 26.6–33)
MCHC RBC AUTO-ENTMCNC: 32.1 G/DL (ref 31.5–35.7)
MCV RBC AUTO: 88.1 FL (ref 79–97)
MONOCYTES # BLD AUTO: 0.7 10*3/MM3 (ref 0.1–0.9)
MONOCYTES NFR BLD AUTO: 11.4 % (ref 5–12)
NEUTROPHILS NFR BLD AUTO: 3.11 10*3/MM3 (ref 1.7–7)
NEUTROPHILS NFR BLD AUTO: 50.5 % (ref 42.7–76)
NRBC BLD AUTO-RTO: 0 /100 WBC (ref 0–0.2)
PLATELET # BLD AUTO: 315 10*3/MM3 (ref 140–450)
PMV BLD AUTO: 9.3 FL (ref 6–12)
POTASSIUM SERPL-SCNC: 3.8 MMOL/L (ref 3.5–5.2)
RBC # BLD AUTO: 3.11 10*6/MM3 (ref 3.77–5.28)
SODIUM SERPL-SCNC: 142 MMOL/L (ref 136–145)
WBC # BLD AUTO: 6.16 10*3/MM3 (ref 3.4–10.8)

## 2021-10-31 PROCEDURE — 99222 1ST HOSP IP/OBS MODERATE 55: CPT | Performed by: INTERNAL MEDICINE

## 2021-10-31 PROCEDURE — 80048 BASIC METABOLIC PNL TOTAL CA: CPT | Performed by: HOSPITALIST

## 2021-10-31 PROCEDURE — 85025 COMPLETE CBC W/AUTO DIFF WBC: CPT | Performed by: HOSPITALIST

## 2021-10-31 PROCEDURE — 99232 SBSQ HOSP IP/OBS MODERATE 35: CPT | Performed by: NURSE PRACTITIONER

## 2021-10-31 RX ADMIN — PANTOPRAZOLE SODIUM 40 MG: 40 TABLET, DELAYED RELEASE ORAL at 18:48

## 2021-10-31 RX ADMIN — GALANTAMINE 8 MG: 4 TABLET, FILM COATED ORAL at 10:48

## 2021-10-31 RX ADMIN — POLYETHYLENE GLYCOL 3350 17 G: 17 POWDER, FOR SOLUTION ORAL at 10:49

## 2021-10-31 RX ADMIN — DOCUSATE SODIUM 50MG AND SENNOSIDES 8.6MG 2 TABLET: 8.6; 5 TABLET, FILM COATED ORAL at 10:49

## 2021-10-31 RX ADMIN — PANTOPRAZOLE SODIUM 40 MG: 40 TABLET, DELAYED RELEASE ORAL at 06:30

## 2021-10-31 RX ADMIN — HYDROXYZINE HYDROCHLORIDE 10 MG: 10 TABLET ORAL at 21:27

## 2021-10-31 RX ADMIN — APIXABAN 5 MG: 5 TABLET, FILM COATED ORAL at 10:48

## 2021-10-31 RX ADMIN — COLLAGENASE SANTYL 1 APPLICATION: 250 OINTMENT TOPICAL at 10:50

## 2021-10-31 RX ADMIN — APIXABAN 2.5 MG: 2.5 TABLET, FILM COATED ORAL at 21:27

## 2021-10-31 RX ADMIN — ASPIRIN 81 MG: 81 TABLET, COATED ORAL at 10:49

## 2021-10-31 RX ADMIN — BISACODYL 10 MG: 10 SUPPOSITORY RECTAL at 10:49

## 2021-10-31 RX ADMIN — Medication 1000 MCG: at 10:48

## 2021-10-31 RX ADMIN — HYDROXYZINE HYDROCHLORIDE 10 MG: 10 TABLET ORAL at 11:37

## 2021-10-31 RX ADMIN — SODIUM CHLORIDE, PRESERVATIVE FREE 10 ML: 5 INJECTION INTRAVENOUS at 10:50

## 2021-10-31 RX ADMIN — SODIUM CHLORIDE, PRESERVATIVE FREE 10 ML: 5 INJECTION INTRAVENOUS at 21:27

## 2021-10-31 RX ADMIN — TRAZODONE HYDROCHLORIDE 50 MG: 50 TABLET ORAL at 21:27

## 2021-10-31 RX ADMIN — ROSUVASTATIN CALCIUM 20 MG: 20 TABLET, FILM COATED ORAL at 21:26

## 2021-11-01 PROCEDURE — 99231 SBSQ HOSP IP/OBS SF/LOW 25: CPT | Performed by: INTERNAL MEDICINE

## 2021-11-01 RX ORDER — LANSOPRAZOLE
30 KIT
Status: DISCONTINUED | OUTPATIENT
Start: 2021-11-01 | End: 2021-11-05 | Stop reason: HOSPADM

## 2021-11-01 RX ADMIN — APIXABAN 2.5 MG: 2.5 TABLET, FILM COATED ORAL at 21:11

## 2021-11-01 RX ADMIN — PANTOPRAZOLE SODIUM 40 MG: 40 TABLET, DELAYED RELEASE ORAL at 17:22

## 2021-11-01 RX ADMIN — ACETAMINOPHEN ORAL SOLUTION 650 MG: 325 SOLUTION ORAL at 17:20

## 2021-11-01 RX ADMIN — ROSUVASTATIN CALCIUM 20 MG: 20 TABLET, FILM COATED ORAL at 21:11

## 2021-11-01 RX ADMIN — SODIUM CHLORIDE, PRESERVATIVE FREE 10 ML: 5 INJECTION INTRAVENOUS at 09:02

## 2021-11-01 RX ADMIN — TRAZODONE HYDROCHLORIDE 50 MG: 50 TABLET ORAL at 21:11

## 2021-11-01 RX ADMIN — APIXABAN 2.5 MG: 2.5 TABLET, FILM COATED ORAL at 09:00

## 2021-11-01 RX ADMIN — PANTOPRAZOLE SODIUM 40 MG: 40 TABLET, DELAYED RELEASE ORAL at 06:29

## 2021-11-01 RX ADMIN — Medication 3 MG: at 21:11

## 2021-11-01 RX ADMIN — DOCUSATE SODIUM 50MG AND SENNOSIDES 8.6MG 2 TABLET: 8.6; 5 TABLET, FILM COATED ORAL at 09:00

## 2021-11-01 RX ADMIN — COLLAGENASE SANTYL 1 APPLICATION: 250 OINTMENT TOPICAL at 09:02

## 2021-11-01 RX ADMIN — LANSOPRAZOLE 30 MG: KIT at 21:11

## 2021-11-01 RX ADMIN — ASPIRIN 81 MG: 81 TABLET, COATED ORAL at 09:00

## 2021-11-01 RX ADMIN — GALANTAMINE 8 MG: 4 TABLET, FILM COATED ORAL at 09:00

## 2021-11-01 RX ADMIN — Medication 1000 MCG: at 09:00

## 2021-11-01 RX ADMIN — HYDROXYZINE HYDROCHLORIDE 10 MG: 10 TABLET ORAL at 17:20

## 2021-11-01 NOTE — PROGRESS NOTES
Name: Marcella Stephens ADMIT: 10/22/2021   : 1923  PCP: Noemi Chester MD    MRN: [id number] LOS: 5 days   AGE/SEX: 89 y.o. female  ROOM: [facility]     Subjective   Subjective   Having adequate bowel movements. Tolerating GI soft diet. Afebrile. No nausea    Review of Systems   Unable to perform ROS: Other (confusion)        Objective   Objective   Vital Signs  Temp:  [97.9 °F (36.6 °C)-98.8 °F (37.1 °C)] 97.9 °F (36.6 °C)  Heart Rate:  [79-96] 79  Resp:  [16] 16  BP: (108-122)/(67-74) 116/68  SpO2:  [92 %-95 %] 94 %  on   ;   Device (Oxygen Therapy): room air  Body mass index is 20.12 kg/m².  Physical Exam  Vitals and nursing note reviewed.   Constitutional:       General: She is not in acute distress.     Appearance: She is ill-appearing. She is not toxic-appearing.      Comments: Frail, chronically ill appearing   HENT:      Head: Normocephalic.      Mouth/Throat:      Mouth: Mucous membranes are moist.   Eyes:      Conjunctiva/sclera: Conjunctivae normal.   Cardiovascular:      Rate and Rhythm: Normal rate and regular rhythm.   Pulmonary:      Effort: Pulmonary effort is normal. No respiratory distress.      Breath sounds: No wheezing or rales.   Abdominal:      General: Bowel sounds are normal.      Palpations: Abdomen is soft.   Musculoskeletal:      Cervical back: Neck supple.      Right lower leg: Edema present.      Left lower leg: Edema present.      Comments: Trace BLE   Neurological:      Mental Status: She is alert. Mental status is at baseline.   Psychiatric:         Mood and Affect: Mood normal.         Behavior: Behavior normal.         Results Review     I reviewed the patient's new clinical results.  Results from last 7 days   Lab Units 10/31/21  0634 10/30/21  0633 10/26/21  0758   WBC 10*3/mm3 6.16 8.29 5.38   HEMOGLOBIN g/dL 8.8* 9.8* 9.1*   PLATELETS 10*3/mm3 315 374 307     Results from last 7 days   Lab Units 10/31/21  0634 10/30/21  0633 10/29/21  0804 10/28/21  0649  10/27/21  2055 10/27/21  0926   SODIUM mmol/L 142 140 144 139  --  141   POTASSIUM mmol/L 3.8 3.6 3.7 4.2   < > 3.3*   CHLORIDE mmol/L 108* 105 110* 111*  --  110*   CO2 mmol/L 29.1* 27.6 28.6 21.0*  --  24.3   BUN mg/dL 2* 3* 3* 2*  --  5*   CREATININE mg/dL 0.29* 0.30* 0.21* <0.17*  --  0.28*   GLUCOSE mg/dL 83 104* 86 86  --  93   EGFR IF NONAFRICN AM mL/min/1.73 >150 >150 >150  --   --  >150    < > = values in this interval not displayed.     Results from last 7 days   Lab Units 10/26/21  0758   ALBUMIN g/dL 2.20*   BILIRUBIN mg/dL 0.2   ALK PHOS U/L 65   AST (SGOT) U/L 11   ALT (SGPT) U/L 8     Results from last 7 days   Lab Units 10/31/21  0634 10/30/21  0633 10/29/21  0804 10/28/21  2157 10/28/21  0649 10/27/21  2055 10/27/21  0926 10/27/21  0926 10/26/21  2248 10/26/21  0758 10/26/21  0758   CALCIUM mg/dL 7.7* 8.0* 7.8*  --  7.3*  --    < > 7.4*  --    < > 7.3*   ALBUMIN g/dL  --   --   --   --   --   --   --   --   --   --  2.20*   MAGNESIUM mg/dL  --   --  1.9  --  2.0  --   --  2.2  --   --  1.8   PHOSPHORUS mg/dL  --   --   --  2.3*  --  2.0*  --   --  1.8*  --  2.1*    < > = values in this interval not displayed.       COVID19   Date Value Ref Range Status   10/23/2021 Not Detected Not Detected - Ref. Range Final   08/27/2021 Not Detected Not Detected - Ref. Range Final     No results found for: HGBA1C, POCGLU    CT Abdomen Pelvis With & Without Contrast  CT ABDOMEN AND PELVIS WITH/WITHOUT CONTRAST     Radiation dose reduction techniques were utilized, including automated  exposure control and exposure modulation based on body size.     CLINICAL INFORMATION: 89-year-old female with nausea, vomiting, and  hematuria.     COMPARISON: None.      FINDINGS: There is cardiac enlargement, bilateral free-flowing pleural  effusions and diffuse body wall edema. There is bibasilar atelectasis.     Both kidneys are satisfactory in size and shape, no renal mass  identified. There are 2 tiny calculi within the right  kidney, the  largest is 2 mm. There is a tiny similar size calculus in upper pole of  the left kidney. No obstructive uropathy. The urinary bladder is  satisfactory in appearance, no intraluminal defect or bladder calculus  identified.     The uterus is small in size, appropriate for age, no adnexal  abnormality.     There is a large amount of dense fecal material within the rectum  consistent with fecal impaction, there is rectal wall thickening as well  as edema in the presacral space. There is diverticulosis of the colon,  no indication of acute diverticulitis. The small bowel is satisfactory  in appearance.     The stomach is collapsed, there is a moderate size hiatal hernia.     There are several hepatic cysts, the largest 12 mm. The gallbladder is  unremarkable. No biliary duct dilatation. The pancreas is within normal  limits. The spleen is satisfactory in size. Both adrenal glands have a  typical appearance. Diameter of the aorta is within normal limits, there  is atherosclerotic plaque formation.     CONCLUSION:  1. Rectal fecal impaction with rectal wall thickening and presacral  edema.  2. Diverticulosis of the colon.  3. Bilateral tiny nonobstructing renal calculi.  4. Bilateral pleural effusions, cardiac enlargement and diffuse body  wall edema.  5. Hiatal hernia.        This report was finalized on 10/29/2021 12:36 PM by Dr. Chet Spears M.D.       Scheduled Medications  apixaban, 2.5 mg, Oral, Q12H  aspirin, 81 mg, Oral, Daily  galantamine, 8 mg, Oral, Daily  pantoprazole, 40 mg, Oral, BID AC  polyethylene glycol, 17 g, Oral, Daily  rosuvastatin, 20 mg, Oral, Nightly  senna-docusate sodium, 2 tablet, Oral, Daily  sodium chloride, 10 mL, Intravenous, Q12H  traZODone, 50 mg, Oral, Nightly  vitamin B-12, 1,000 mcg, Oral, Daily    Infusions   Diet  Diet Regular; GI Soft       Assessment/Plan     Active Hospital Problems    Diagnosis  POA   • **Acute UTI [N39.0]  Yes   • Postprandial vomiting [R11.10]   Yes   • Hiatal hernia [K44.9]  Yes   • Hypokalemia [E87.6]  Yes   • History of DVT (deep vein thrombosis) [Z86.718]  Not Applicable   • Elevated troponin [R77.8]  Yes   • Age-related physical debility [R54]  Yes   • Recent cerebrovascular accident (CVA) [Z86.73]  Yes   • Anemia [D64.9]  Yes   • Vitamin D deficiency [E55.9]  Yes   • Alzheimer's dementia (HCC) [G30.9, F02.80]  Yes      Resolved Hospital Problems   No resolved problems to display.       89-year-old with history of recent stroke without residual deficit and dementia who was brought in by EMS after being found down at home. She is admitted for a UTI.      Elevated troponin, no chest pain but she did have reoccurring vomitingm postprandial,  family thinks this is likely related to severe reflux:  -Repeated tropoin due to GI symptoms; down to 0.039-->0.035, previously 0.120, 0.110  -EKG t wave changes  -already on aspirin, eliquis and crestor  -Continue aspirin for now  -Appreciate Cardiology assistance; no further workup     UTI  -S/P 5 days ceftriaxone for +proteus infection     Postprandial vomiting:  -Appreciate GI assistance. KUB w/o evidence of obstruction. EGD last month performed for hematemesis reported small hiatal hernia; no evidence of bleeding. CT scan reviewed  -Now tolerating GI soft diet, having adequate bowel movements  -Report of some blood in her stool, now resolved. Eliquis decreased to 2.5 twice daily per Cardiology recommendation     Dementia  -Continue galantamine     History of DVT: On Eliquis    History of stroke without residual deficit: Continue aspirin and statin     Anxiety:  -PRN hydroxyzine      Hypokalemia:  -Improved w/repletion     Nutrition following; monitor BUN/Cr     · Eliquis (home med) for DVT prophylaxis.  · Limited code (no CPR, no intubation).  · Discussed with patient, nursing staff, CCP.  · Anticipate discharge to SNU facility once precert obtained       NILS Fink  [facility]  11/01/21  14:46 EDT

## 2021-11-01 NOTE — PROGRESS NOTES
Continued Stay Note  [facility]     Patient Name: Marcella Stephens  MRN: [id number]  Today's Date: 11/1/2021    Admit Date: 10/22/2021     Discharge Plan     Row Name 11/01/21 0911       Plan    Plan [facility] (PENDING Pre-cert)    Plan Comments Msg sent to Blanca with Signature to start Pre-cert to [facility].               Discharge Codes    No documentation.                     Ying Mcfarland RN

## 2021-11-01 NOTE — PLAN OF CARE
Goal Outcome Evaluation:  Plan of Care Reviewed With: patient        Progress: improving  Outcome Summary: VSS. Pt pleasant and cooperative, confused to place, time and situation. liliana diet well but had one small episode of emesis after lunch. pictures taken of coccyx, R heel and chan hip PUs in anticipation of d/c to SNF today, but awaiting pre-cert still. turning freq and air mattress, floating heels. sitting upright to eat, np cough noted after intake. meds whole in applesauce and she chews them but liliana well. Anxious and restless, calling out and upset about her confusion this afternoon. Atarax given prn. Only small loose BMs today.

## 2021-11-01 NOTE — PROGRESS NOTES
[facility]  Inpatient Progress Note    Reason for Follow Up:  Nausea, vomiting, fecal impaction       Subjective     Interval History:   Stooling repeatedly per nursing.  She tolerated her breakfast and is eating well without nausea or vomiting.    Current Facility-Administered Medications:   •  acetaminophen (TYLENOL) tablet 650 mg, 650 mg, Oral, Q4H PRN, 650 mg at 10/30/21 1026 **OR** acetaminophen (TYLENOL) 160 MG/5ML solution 650 mg, 650 mg, Oral, Q4H PRN **OR** acetaminophen (TYLENOL) suppository 650 mg, 650 mg, Rectal, Q4H PRN, Laura Wilde APRN  •  apixaban (ELIQUIS) tablet 2.5 mg, 2.5 mg, Oral, Q12H, Laura Muhammad MD, 2.5 mg at 11/01/21 0900  •  aspirin EC tablet 81 mg, 81 mg, Oral, Daily, Steph Cabrera DO, 81 mg at 11/01/21 0900  •  bisacodyl (DULCOLAX) suppository 10 mg, 10 mg, Rectal, Daily PRN, Steph Cabrera DO, 10 mg at 10/30/21 1453  •  bisacodyl (DULCOLAX) suppository 10 mg, 10 mg, Rectal, Daily, Pavel Delacruz MD, 10 mg at 10/31/21 1049  •  calcium carbonate (TUMS) chewable tablet 500 mg (200 mg elemental), 2 tablet, Oral, BID PRN, Laura Wilde APRN  •  collagenase ointment 1 application, 1 application, Topical, Q24H, Primo Olmstead MD, 1 application at 11/01/21 0902  •  galantamine (RAZADYNE) tablet 8 mg, 8 mg, Oral, Daily, Steph Cabrera DO 8 mg at 11/01/21 0900  •  hydrOXYzine (ATARAX) tablet 10 mg, 10 mg, Oral, TID PRN, Elizabeth Rasheed APRN, 10 mg at 10/31/21 2127  •  Magnesium Sulfate 2 gram Bolus, followed by 8 gram infusion (total Mg dose 10 grams)- Mg less than or equal to 1mg/dL, 2 g, Intravenous, PRN **OR** Magnesium Sulfate 2 gram / 50mL Infusion (GIVE X 3 BAGS TO EQUAL 6GM TOTAL DOSE) - Mg 1.1 - 1.5 mg/dl, 2 g, Intravenous, PRN **OR** Magnesium Sulfate 4 gram infusion- Mg 1.6-1.9 mg/dL, 4 g, Intravenous, PRN, Steph Cabrera DO, Last Rate: 25 mL/hr at 10/26/21 2149, Restarted at 10/26/21 2149  •  melatonin  tablet 3 mg, 3 mg, Oral, Nightly PRN, Steph Cabrera DO, 3 mg at 10/28/21 2023  •  ondansetron (ZOFRAN) tablet 4 mg, 4 mg, Oral, Q6H PRN **OR** ondansetron (ZOFRAN) injection 4 mg, 4 mg, Intravenous, Q6H PRN, Laura Wilde APRN  •  pantoprazole (PROTONIX) EC tablet 40 mg, 40 mg, Oral, BID AC, Primo Olmstead MD, 40 mg at 11/01/21 0629  •  polyethylene glycol (MIRALAX) packet 17 g, 17 g, Oral, Daily, Elizabeth Rasheed APRN, 17 g at 11/01/21 0901  •  rosuvastatin (CRESTOR) tablet 20 mg, 20 mg, Oral, Nightly, Steph Cabrera DO, 20 mg at 10/31/21 2126  •  sennosides-docusate (PERICOLACE) 8.6-50 MG per tablet 2 tablet, 2 tablet, Oral, Daily, Steph Cabrera DO, 2 tablet at 11/01/21 0900  •  sodium chloride 0.9 % flush 10 mL, 10 mL, Intravenous, Q12H, Laura Wilde APRN, 10 mL at 11/01/21 0902  •  traZODone (DESYREL) tablet 50 mg, 50 mg, Oral, Nightly, Steph Cabrera, , 50 mg at 10/31/21 2127  •  vitamin B-12 (CYANOCOBALAMIN) tablet 1,000 mcg, 1,000 mcg, Oral, Daily, Steph Cabrera DO, 1,000 mcg at 11/01/21 0900  Review of Systems:    The following systems were reviewed and negative;  constitution and gastrointestinal    Objective     Vital Signs  Temp:  [97.9 °F (36.6 °C)-98.8 °F (37.1 °C)] 97.9 °F (36.6 °C)  Heart Rate:  [79-96] 79  Resp:  [16] 16  BP: (108-122)/(67-74) 116/68  Body mass index is 20.12 kg/m².    Intake/Output Summary (Last 24 hours) at 11/1/2021 1034  Last data filed at 11/1/2021 0505  Gross per 24 hour   Intake --   Output 500 ml   Net -500 ml     No intake/output data recorded.     Physical Exam:   General: patient awake, alert and cooperative   Eyes: Normal lids and lashes, no scleral icterus   Neck: supple, normal ROM   Skin: warm and dry, not jaundiced   Pulm:   regular and unlabored   Abdomen: soft, nontender, nondistended    Extremities: no rash or edema   Psychiatric: Normal mood and behavior; memory intact     Results Review:     I reviewed the  patient's new clinical results.    Results from last 7 days   Lab Units 10/31/21  0634 10/30/21  0633 10/26/21  0758   WBC 10*3/mm3 6.16 8.29 5.38   HEMOGLOBIN g/dL 8.8* 9.8* 9.1*   HEMATOCRIT % 27.4* 31.6* 29.0*   PLATELETS 10*3/mm3 315 374 307     Results from last 7 days   Lab Units 10/31/21  0634 10/30/21  0633 10/29/21  0804 10/27/21  0926 10/26/21  0758   SODIUM mmol/L 142 140 144   < > 143   POTASSIUM mmol/L 3.8 3.6 3.7   < > 4.1   CHLORIDE mmol/L 108* 105 110*   < > 113*   CO2 mmol/L 29.1* 27.6 28.6   < > 20.4*   BUN mg/dL 2* 3* 3*   < > 9   CREATININE mg/dL 0.29* 0.30* 0.21*   < > 0.29*   CALCIUM mg/dL 7.7* 8.0* 7.8*   < > 7.3*   BILIRUBIN mg/dL  --   --   --   --  0.2   ALK PHOS U/L  --   --   --   --  65   ALT (SGPT) U/L  --   --   --   --  8   AST (SGOT) U/L  --   --   --   --  11   GLUCOSE mg/dL 83 104* 86   < > 88    < > = values in this interval not displayed.         Lab Results   Lab Value Date/Time    LIPASE 14 09/26/2021 0341       Radiology:  CT Abdomen Pelvis With & Without Contrast   Final Result      XR Abdomen KUB   Final Result   Negative.       This report was finalized on 10/26/2021 9:17 PM by Dr. Kirt Jimenes M.D.          XR Knee 1 or 2 View Left   Final Result         Electronically signed by Rodri Obando MD on 10-23-21 at 0003      XR Hip With or Without Pelvis 2 - 3 View Right   Final Result         Electronically signed by Rodri Obando MD on 10-22-21 at 2302      XR Chest 1 View   Final Result         Electronically signed by Rodri Obando MD on 10-22-21 at 2300          Assessment/Plan     Patient Active Problem List   Diagnosis   • Closed fracture of right hip (HCC)   • Fall as cause of accidental injury at home as place of occurrence   • Alzheimer's dementia (HCC)   • Anemia   • Vitamin D deficiency   • Weakness of right upper extremity   • Acute retention of urine   • Hydronephrosis   • Acute deep vein thrombosis (DVT) of brachial vein of right upper extremity  (HCC)   • Recent cerebrovascular accident (CVA)   • DVT of lower extremity, bilateral (HCC)   • Acute UTI   • History of DVT (deep vein thrombosis)   • Elevated troponin   • Age-related physical debility   • Postprandial vomiting   • Hiatal hernia   • Hypokalemia     All problems are new to me today  Assessment:  1. Nausea, vomiting, resolved  2. Fecal impaction        Plan:  · Continue aggressive bowel regimen-we will DC suppository at this point she is no longer impacted and is certainly stooling adequately.  · Now tolerating diet.  Continue as tolerated.  · No further recommendations at this time-we will sign off but are available as needed    I discussed the patients findings and my recommendations with patient and nursing staff.    Karin Stone MD

## 2021-11-01 NOTE — NURSING NOTE
11/01/21 1100   Wound 10/27/21 1134 Left anterior hip Pressure Injury   Placement Date/Time: 10/27/21 1134   Side: Left  Orientation: anterior  Location: hip  Primary Wound Type: Pressure Injury  Stage, Pressure Injury : Stage 2   Base closed/resurfaced   Periwound intact; blanchable   Drainage Amount none   Dressing Care dressing changed; silicone; border dressing   Wound 10/27/21 1135 Right anterior hip Pressure Injury   Placement Date/Time: 10/27/21 1135   Side: Right  Orientation: anterior  Location: hip  Primary Wound Type: Pressure Injury  Stage, Pressure Injury : Stage 2   Base closed/resurfaced   Periwound intact; blanchable   Drainage Amount none   Dressing Care dressing changed; silicone; border dressing   Wound 10/27/21 1136 Right heel Pressure Injury   Placement Date/Time: 10/27/21 1136   Side: Right  Location: heel  Primary Wound Type: Pressure Injury  Stage, Pressure Injury : unstageable   Base clean; granulating; pink   Periwound intact; dry  (callous)   Periwound Temperature warm   Periwound Skin Turgor soft   Wound Length (cm) 1.3 cm   Wound Width (cm) 1.1 cm   Drainage Characteristics/Odor serosanguineous   Drainage Amount scant   Care, Wound cleansed with; sterile normal saline   Dressing Care dressing changed; silver impregnated; hydrofiber; silicone; border dressing   Wound 10/27/21 1136 coccyx Pressure Injury   Placement Date/Time: 10/27/21 1136   Location: coccyx  Primary Wound Type: Pressure Injury  Stage, Pressure Injury : Stage 1   Base blanchable; closed/resurfaced   Periwound intact; blanchable   Drainage Amount none   Care, Wound barrier applied     CWOn note: pt seen for follow up evaluation of pressure injuries. All wounds have reepithelialized except her heel wound. May use Optifoam dressings for protection/ prevention to trochanter and coccyx. Heel wound is no longer necrotic, so will D/C the Santyl and begin Opticel AG with Optifoam dressing every other day. Continue to off-load  heels. Pt remains on low air loss mattress for adequate pressure redistribution.

## 2021-11-02 PROCEDURE — 99232 SBSQ HOSP IP/OBS MODERATE 35: CPT | Performed by: INTERNAL MEDICINE

## 2021-11-02 PROCEDURE — 97110 THERAPEUTIC EXERCISES: CPT

## 2021-11-02 RX ADMIN — TRAZODONE HYDROCHLORIDE 50 MG: 50 TABLET ORAL at 20:09

## 2021-11-02 RX ADMIN — LANSOPRAZOLE 30 MG: KIT at 07:42

## 2021-11-02 RX ADMIN — POLYETHYLENE GLYCOL 3350 17 G: 17 POWDER, FOR SOLUTION ORAL at 10:00

## 2021-11-02 RX ADMIN — LANSOPRAZOLE 30 MG: KIT at 17:43

## 2021-11-02 RX ADMIN — APIXABAN 2.5 MG: 2.5 TABLET, FILM COATED ORAL at 10:00

## 2021-11-02 RX ADMIN — ROSUVASTATIN CALCIUM 20 MG: 20 TABLET, FILM COATED ORAL at 20:09

## 2021-11-02 RX ADMIN — ASPIRIN 81 MG: 81 TABLET, COATED ORAL at 10:00

## 2021-11-02 RX ADMIN — Medication 1000 MCG: at 10:00

## 2021-11-02 RX ADMIN — HYDROXYZINE HYDROCHLORIDE 10 MG: 10 TABLET ORAL at 20:09

## 2021-11-02 RX ADMIN — DOCUSATE SODIUM 50MG AND SENNOSIDES 8.6MG 2 TABLET: 8.6; 5 TABLET, FILM COATED ORAL at 10:00

## 2021-11-02 RX ADMIN — NYSTATIN 500000 UNITS: 100000 SUSPENSION ORAL at 22:24

## 2021-11-02 RX ADMIN — APIXABAN 2.5 MG: 2.5 TABLET, FILM COATED ORAL at 20:09

## 2021-11-02 RX ADMIN — GALANTAMINE 8 MG: 4 TABLET, FILM COATED ORAL at 10:00

## 2021-11-02 RX ADMIN — HYDROXYZINE HYDROCHLORIDE 10 MG: 10 TABLET ORAL at 10:00

## 2021-11-02 RX ADMIN — Medication 3 MG: at 20:09

## 2021-11-02 NOTE — PLAN OF CARE
Goal Outcome Evaluation:  Plan of Care Reviewed With: patient        Progress: improving  Outcome Summary: Pt was able to take some steps today w/rwx assist of 2, but began w/min assist of 2 and became max2 due to pt panicking during last few steps; denied incr pain or fear of falling once seated-like nothing ever happened; pt perf LE exer  prog in chair x10 reps, plans SNU at [place]    Sean Guadalupe, PT Tech present    Patient was wearing a face mask during this therapy encounter. Therapist used appropriate personal protective equipment including eye protection, mask, and gloves.  Mask used was standard procedure mask. Appropriate PPE was worn during the entire therapy session. Hand hygiene was completed before and after therapy session. Patient is not in enhanced droplet precautions.

## 2021-11-02 NOTE — PLAN OF CARE
"Goal Outcome Evaluation:           Progress: no change  Outcome Summary: BP low at times. turn q2h. medicated for anxiety. poor oral intake. worked with PT. awaiting precert for D/C to [facility]. Encouraged PO intake, pt has had boost clear x2. will \"spit up\" food at times. pills whole in applesauce. wound care to be completed tomorrow. son-in-law came by today and was updated on plan.  "

## 2021-11-02 NOTE — PROGRESS NOTES
[facility]  Inpatient Progress Note    Reason for Follow Up: Nausea and vomiting and impaction of feces    Subjective     Interval History:   Small bowel movement noted, nursing notes review  No nausea or vomiting    Current Facility-Administered Medications:   •  acetaminophen (TYLENOL) tablet 650 mg, 650 mg, Oral, Q4H PRN, 650 mg at 10/30/21 1026 **OR** acetaminophen (TYLENOL) 160 MG/5ML solution 650 mg, 650 mg, Oral, Q4H PRN, 650 mg at 11/01/21 1720 **OR** acetaminophen (TYLENOL) suppository 650 mg, 650 mg, Rectal, Q4H PRN, Laura Wilde APRN  •  apixaban (ELIQUIS) tablet 2.5 mg, 2.5 mg, Oral, Q12H, Laura Muhammad MD, 2.5 mg at 11/01/21 2111  •  aspirin EC tablet 81 mg, 81 mg, Oral, Daily, Steph Cabrera DO, 81 mg at 11/01/21 0900  •  bisacodyl (DULCOLAX) suppository 10 mg, 10 mg, Rectal, Daily PRN, Steph Cabrera DO, 10 mg at 10/30/21 1453  •  calcium carbonate (TUMS) chewable tablet 500 mg (200 mg elemental), 2 tablet, Oral, BID PRN, Laura Wilde APRN  •  galantamine (RAZADYNE) tablet 8 mg, 8 mg, Oral, Daily, Steph Cabrera DO, 8 mg at 11/01/21 0900  •  hydrOXYzine (ATARAX) tablet 10 mg, 10 mg, Oral, TID PRN, Elizabeth Rasheed APRN, 10 mg at 11/01/21 1720  •  lansoprazole (FIRST) oral suspension 30 mg, 30 mg, Oral, BID AC, Primo Olmstead MD, 30 mg at 11/02/21 0742  •  Magnesium Sulfate 2 gram Bolus, followed by 8 gram infusion (total Mg dose 10 grams)- Mg less than or equal to 1mg/dL, 2 g, Intravenous, PRN **OR** Magnesium Sulfate 2 gram / 50mL Infusion (GIVE X 3 BAGS TO EQUAL 6GM TOTAL DOSE) - Mg 1.1 - 1.5 mg/dl, 2 g, Intravenous, PRN **OR** Magnesium Sulfate 4 gram infusion- Mg 1.6-1.9 mg/dL, 4 g, Intravenous, PRN, Steph Cabrera DO, Last Rate: 25 mL/hr at 10/26/21 2149, Restarted at 10/26/21 2149  •  melatonin tablet 3 mg, 3 mg, Oral, Nightly PRN, Steph Cabrera DO, 3 mg at 11/01/21 2111  •  ondansetron (ZOFRAN) tablet 4 mg, 4 mg,  Oral, Q6H PRN **OR** ondansetron (ZOFRAN) injection 4 mg, 4 mg, Intravenous, Q6H PRN, Laura Wilde APRN  •  polyethylene glycol (MIRALAX) packet 17 g, 17 g, Oral, Daily, Wili ElizabethNILS Alfonso, 17 g at 10/31/21 1049  •  rosuvastatin (CRESTOR) tablet 20 mg, 20 mg, Oral, Nightly, Steph Cabrera DO, 20 mg at 11/01/21 2111  •  sennosides-docusate (PERICOLACE) 8.6-50 MG per tablet 2 tablet, 2 tablet, Oral, Daily, Steph Cabrera DO, 2 tablet at 11/01/21 0900  •  sodium chloride 0.9 % flush 10 mL, 10 mL, Intravenous, Q12H, Laura Wilde APRN, 10 mL at 11/01/21 0902  •  traZODone (DESYREL) tablet 50 mg, 50 mg, Oral, Nightly, Steph Cabrera DO, 50 mg at 11/01/21 2111  •  vitamin B-12 (CYANOCOBALAMIN) tablet 1,000 mcg, 1,000 mcg, Oral, Daily, Steph Cabrera DO, 1,000 mcg at 11/01/21 0900  Review of Systems:    There is weakness and fatigue all other systems reviewed and negative    Objective     Vital Signs  Temp:  [97.2 °F (36.2 °C)-98.9 °F (37.2 °C)] 97.2 °F (36.2 °C)  Heart Rate:  [68-80] 68  Resp:  [16] 16  BP: ()/(54-76) 90/58  Body mass index is 20.12 kg/m².    Intake/Output Summary (Last 24 hours) at 11/2/2021 0940  Last data filed at 11/2/2021 0411  Gross per 24 hour   Intake 480 ml   Output 400 ml   Net 80 ml     No intake/output data recorded.     Physical Exam:   General: patient awake, alert and cooperative   Eyes: Normal lids and lashes, no scleral icterus   Neck: supple, normal ROM   Skin: warm and dry, not jaundiced   Cardiovascular: regular rhythm and rate, no murmurs auscultated   Pulm: clear to auscultation bilaterally, regular and unlabored   Abdomen: soft, nontender, nondistended; normal bowel sounds   Extremities: no rash or edema   Psychiatric: Normal mood and behavior; memory intact     Results Review:     I reviewed the patient's new clinical results.    Results from last 7 days   Lab Units 10/31/21  0634 10/30/21  0633   WBC 10*3/mm3 6.16 8.29   HEMOGLOBIN  g/dL 8.8* 9.8*   HEMATOCRIT % 27.4* 31.6*   PLATELETS 10*3/mm3 315 374     Results from last 7 days   Lab Units 10/31/21  0634 10/30/21  0633 10/29/21  0804   SODIUM mmol/L 142 140 144   POTASSIUM mmol/L 3.8 3.6 3.7   CHLORIDE mmol/L 108* 105 110*   CO2 mmol/L 29.1* 27.6 28.6   BUN mg/dL 2* 3* 3*   CREATININE mg/dL 0.29* 0.30* 0.21*   CALCIUM mg/dL 7.7* 8.0* 7.8*   GLUCOSE mg/dL 83 104* 86         Lab Results   Lab Value Date/Time    LIPASE 14 09/26/2021 0341       Radiology:  CT Abdomen Pelvis With & Without Contrast   Final Result      XR Abdomen KUB   Final Result   Negative.       This report was finalized on 10/26/2021 9:17 PM by Dr. Kirt Jimenes M.D.          XR Knee 1 or 2 View Left   Final Result         Electronically signed by Rodri Obando MD on 10-23-21 at 0003      XR Hip With or Without Pelvis 2 - 3 View Right   Final Result         Electronically signed by Rodri Obando MD on 10-22-21 at 2302      XR Chest 1 View   Final Result         Electronically signed by Rodri Obando MD on 10-22-21 at 2300          Assessment/Plan     Patient Active Problem List   Diagnosis   • Closed fracture of right hip (HCC)   • Fall as cause of accidental injury at home as place of occurrence   • Alzheimer's dementia (HCC)   • Anemia   • Vitamin D deficiency   • Weakness of right upper extremity   • Acute retention of urine   • Hydronephrosis   • Acute deep vein thrombosis (DVT) of brachial vein of right upper extremity (HCC)   • Recent cerebrovascular accident (CVA)   • DVT of lower extremity, bilateral (HCC)   • Acute UTI   • History of DVT (deep vein thrombosis)   • Elevated troponin   • Age-related physical debility   • Postprandial vomiting   • Hiatal hernia   • Hypokalemia         All problems are new to me today  Assessment:  1. Nausea, vomiting, resolved  2. Fecal impaction           Plan:  · Continue aggressive bowel regimen-we will DC suppository at this point she is no longer impacted and is  certainly stooling adequately.  · Now tolerating diet.  Continue as tolerated.  · No further recommendations at this time-we will sign off but are available as needed    I discussed the patients findings and my recommendations with patient and nursing staff.    Pavel Lemon MD

## 2021-11-02 NOTE — PLAN OF CARE
Goal Outcome Evaluation:  Plan of Care Reviewed With: patient           Outcome Summary: VSS, pt slept most of shift, purewick inplace, no c/o pain or nausea, pt stated heel feels better, q2 turns preformed, no IV access, very small BM, meds given in applesauce.

## 2021-11-02 NOTE — THERAPY TREATMENT NOTE
Patient Name: Marcella Stephens  : 1923    MRN: [id number]                              Today's Date: 2021       Admit Date: 10/22/2021    Visit Dx:     ICD-10-CM ICD-9-CM   1. Acute UTI  N39.0 599.0   2. Elevated troponin  R77.8 790.6   3. Anemia, unspecified type  D64.9 285.9   4. Weakness  R53.1 780.79   5. Unable to ambulate  R26.2 719.7   6. Right hip pain  M25.551 719.45   7. Acute pain of right knee  M25.561 719.46   8. Dementia without behavioral disturbance, unspecified dementia type (HCC)  F03.90 294.20   9. Abnormal TSH  R79.89 790.6     Patient Active Problem List   Diagnosis   • Closed fracture of right hip (HCC)   • Fall as cause of accidental injury at home as place of occurrence   • Alzheimer's dementia (HCC)   • Anemia   • Vitamin D deficiency   • Weakness of right upper extremity   • Acute retention of urine   • Hydronephrosis   • Acute deep vein thrombosis (DVT) of brachial vein of right upper extremity (HCC)   • Recent cerebrovascular accident (CVA)   • DVT of lower extremity, bilateral (HCC)   • Acute UTI   • History of DVT (deep vein thrombosis)   • Elevated troponin   • Age-related physical debility   • Postprandial vomiting   • Hiatal hernia   • Hypokalemia     Past Medical History:   Diagnosis Date   • Acute embolism and thombos unsp deep veins of low extrm, bi ([facility])    • Prediabetes    • Vascular dementia without behavioral disturbance ([facility])      Past Surgical History:   Procedure Laterality Date   • BACK SURGERY     • FEMUR IM NAILING/RODDING Right 2021    Procedure: FEMUR INTRAMEDULLARY NAILING/RODDING;  Surgeon: Louis Scruggs MD;  Location: [facility];  Service: Orthopedics;  Laterality: Right;   • KNEE SURGERY        General Information     Row Name 21          Physical Therapy Time and Intention    Document Type therapy note (daily note)  -NEIL     Mode of Treatment individual therapy; physical therapy  -     Row Name 21           General Information    Patient Profile Reviewed yes  -     Existing Precautions/Restrictions fall  -     Row Name 11/02/21 0911          Living Environment    Lives With alone  -     Row Name 11/02/21 0911          Cognition    Orientation Status (Cognition) oriented to; person; place  -     Row Name 11/02/21 0911          Safety Issues, Functional Mobility    Safety Issues Affecting Function (Mobility) awareness of need for assistance; friction/shear risk; insight into deficits/self-awareness; judgment; positioning of assistive device; problem-solving; safety precaution awareness; sequencing abilities  -     Impairments Affecting Function (Mobility) balance; cognition; endurance/activity tolerance; postural/trunk control; strength  -     Cognitive Impairments, Mobility Safety/Performance attention; insight into deficits/self-awareness; judgment; problem-solving/reasoning; safety precaution awareness; safety precaution follow-through; sequencing abilities  -           User Key  (r) = Recorded By, (t) = Taken By, (c) = Cosigned By    Initials Name Provider Type     Laura Dorsey PTA Physical Therapy Assistant               Mobility     Row Name 11/02/21 0914          Bed Mobility    Scooting/Bridging Runnels (Bed Mobility) 2 person assist; moderate assist (50% patient effort)  -     Supine-Sit [place] (Bed Mobility) 2 person assist; minimum assist (75% patient effort)  -     Sit-Supine [place] (Bed Mobility) not tested  -     Assistive Device (Bed Mobility) draw sheet; head of bed elevated  -     Row Name 11/02/21 0914          Sit-Stand Transfer    Sit-Stand [place] (Transfers) 2 person assist; moderate assist (50% patient effort); verbal cues; nonverbal cues (demo/gesture)  required 2 attempts  -     Assistive Device (Sit-Stand Transfers) walker, front-wheeled  -     Row Name 11/02/21 0914          Gait/Stairs (Locomotion)    [place]unnels Level (Gait) 2 person  "assist; minimum assist (75% patient effort); maximum assist (25% patient effort); verbal cues; nonverbal cues (demo/gesture)  -     Assistive Device (Gait) walker, front-wheeled  -     Distance in Feet (Gait) 5ft bed to chair, had to pull chair closer as pt became fearful last few steps and req max2 for safety  -     Deviations/Abnormal Patterns (Gait) base of support, narrow; jorden decreased; festinating/shuffling; stride length decreased  -     Bilateral Gait Deviations forward flexed posture  -     Comment (Gait/Stairs) pt req multiple cues to incr GRACE for safety, pt began first few steps w/min assist then panicked and said \"Oh my God\" and she doesn't know why, req max2 to finish amb  -           User Key  (r) = Recorded By, (t) = Taken By, (c) = Cosigned By    Initials Name Provider Type    Laura Gustafson PTA Physical Therapy Assistant               Obj/Interventions     [facility] Name 11/02/21 0919          Motor Skills    Therapeutic Exercise --  APs, QS, HS, hip abd/add, seated MIP , LAQs x10 reps  -           User Key  (r) = Recorded By, (t) = Taken By, (c) = Cosigned By    Initials Name Provider Type    Laura Gustafson PTA Physical Therapy Assistant               Goals/Plan    No documentation.                Clinical Impression     [facility] Name 11/02/21 0920          Pain    Additional Documentation Pain Scale: FACES Pre/Post-Treatment (Group)  -JM     Row Name 11/02/21 0920          Pain Scale: Numbers Pre/Post-Treatment    Pain Location - Side Right  -     Pre/Posttreatment Pain Comment Heel, propped w/pillow, heels hanging off freely  -     Pain Intervention(s) Repositioned  -     Row Name 11/02/21 0920          Pain Scale: FACES Pre/Post-Treatment    Pre/Posttreatment Pain Comment no number given  -JM     Row Name 11/02/21 0920          Plan of Care Review    Plan of Care Reviewed With patient  -     Progress improving  -     Outcome Summary Pt was able to take some steps " today w/rwx assist of 2, but began w/min assist of 2 and became max2 due to pt panicking during last few steps; denied incr pain or fear of falling once seated-like nothing ever happened; pt perf LE exer  prog in chair x10 reps, plans SNU at [place]  -     Row Name 11/02/21 0920          Therapy Assessment/Plan (PT)    Rehab Potential (PT) good, to achieve stated therapy goals  -     Criteria for Skilled Interventions Met (PT) yes  -     Row Name 11/02/21 0920          Positioning and Restraints    Pre-Treatment Position in bed  -     Post Treatment Position chair  -JM     In Chair reclined; call light within reach; encouraged to call for assist; exit alarm on; notified nsg  -           User Key  (r) = Recorded By, (t) = Taken By, (c) = Cosigned By    Initials Name Provider Type    Laura Gustafson PTA Physical Therapy Assistant               Outcome Measures     Row Name 11/02/21 0925          How much help from another person do you currently need...    Turning from your back to your side while in flat bed without using bedrails? 3  -JM     Moving from lying on back to sitting on the side of a flat bed without bedrails? 2  -JM     Moving to and from a bed to a chair (including a wheelchair)? 2  -JM     Standing up from a chair using your arms (e.g., wheelchair, bedside chair)? 2  -JM     Climbing 3-5 steps with a railing? 1  -JM     To walk in hospital room? 2  -JM     AM-PAC 6 Clicks Score (PT) 12  -           User Key  (r) = Recorded By, (t) = Taken By, (c) = Cosigned By    Initials Name Provider Type    Laura Gustafson PTA Physical Therapy Assistant                             Physical Therapy Education                 Title: PT OT SLP Therapies (Done)     Topic: Physical Therapy (Done)     Point: Mobility training (Done)     Learning Progress Summary           Patient Acceptance, E,TB,D, VU,NR by NEIL at 11/2/2021 0925    Acceptance, E,TB,D, VU,NR by TREVER at 10/30/2021 1348    Acceptance, E, NR by  AR at 10/29/2021 1510    Acceptance, E,TB,D, VU,NR by  at 10/26/2021 1710    Acceptance, E,TB,D, VU,NR by  at 10/25/2021 1209    Acceptance, E,TB, VU,NR by MT at 10/25/2021 0500    Acceptance, E,TB, VU,NR by MT at 10/24/2021 0531    Acceptance, E,TB,D, VU,DU,NR by  at 10/23/2021 1526                   Point: Home exercise program (Done)     Learning Progress Summary           Patient Acceptance, E,TB,D, VU,NR by NEIL at 11/2/2021 0925    Acceptance, E, NR by AR at 10/29/2021 1510    Acceptance, E,TB,D, VU,NR by  at 10/26/2021 1710    Acceptance, E,TB,D, VU,NR by  at 10/25/2021 1209                   Point: Body mechanics (Done)     Learning Progress Summary           Patient Acceptance, E,TB,D, VU,NR by NEIL at 11/2/2021 0925    Acceptance, E, NR by AR at 10/29/2021 1510    Acceptance, E,TB,D, VU,NR by  at 10/26/2021 1710    Acceptance, E,TB,D, VU,NR by  at 10/25/2021 1209                   Point: Precautions (Done)     Learning Progress Summary           Patient Acceptance, E,TB,D, VU,NR by NEIL at 11/2/2021 0925    Acceptance, E, NR by AR at 10/29/2021 1510    Acceptance, E,TB,D, VU,NR by  at 10/26/2021 1710    Acceptance, E,TB,D, VU,NR by  at 10/25/2021 1209    Acceptance, E,TB, VU,NR by MT at 10/25/2021 0500    Acceptance, E,TB, VU,NR by MT at 10/24/2021 0531    Acceptance, E,TB,D, VU,DU,NR by  at 10/23/2021 1526                               User Key     Initials Effective Dates Name Provider Type Discipline     03/07/18 -  Laura Dorsey PTA Physical Therapy Assistant PT    MT 06/16/21 -  Pancho Abdi, RN Registered Nurse Nurse     06/16/21 -  Thania Monzon, PT Physical Therapist PT    AR 06/16/21 -  Manuela Berman, PT Physical Therapist PT     03/07/18 -  Kellie Dorsey, PTA Physical Therapy Assistant PT     05/26/20 -  Lisette Shane, GUNNER Physical Therapist PT              PT Recommendation and Plan     Plan of Care Reviewed With: patient  Progress: improving  Outcome  Summary: Pt was able to take some steps today w/rwx assist of 2, but began w/min assist of 2 and became max2 due to pt panicking during last few steps; denied incr pain or fear of falling once seated-like nothing ever happened; pt perf LE exer  prog in chair x10 reps, plans SNU at [place]     Time Calculation:    PT Charges     Row Name 11/02/21 0926             Time Calculation    Start Time 0840  -      Stop Time 0907  -      Time Calculation (min) 27 min  -      PT Received On 11/02/21  -NEIL      PT - Next Appointment 11/03/21  -NEIL            User Key  (r) = Recorded By, (t) = Taken By, (c) = Cosigned By    Initials Name Provider Type    Laura Gustafson PTA Physical Therapy Assistant              Therapy Charges for Today     Code Description Service Date Service Provider Modifiers Qty    05159555046 HC PT THER PROC EA 15 MIN 11/2/2021 Laura Dorsey PTA GP 2    89787779938 HC PT THER SUPP EA 15 MIN 11/2/2021 Laura Dorsey PTA GP 1          PT G-Codes  Outcome Measure Options: AM-PAC 6 Clicks Basic Mobility (PT)  AM-PAC 6 Clicks Score (PT): 12  AM-PAC 6 Clicks Score (OT): 9  Modified [place] Scale: 5 - Severe disability.  Bedridden, incontinent, and requiring constant nursing care and attention.    Laura Dorsey PTA  11/2/2021

## 2021-11-02 NOTE — PROGRESS NOTES
Continued Stay Note  [facility]     Patient Name: Marcella Stephens  MRN: [id number]  Today's Date: 11/2/2021    Admit Date: 10/22/2021     Discharge Plan     Row Name 11/02/21 1016       Plan    Plan [facility] (PENDING Pre-Cert)    Plan Comments Pre-cert pending. Blanca with [employer] will inform CCP once obtained. Updated RN               Discharge Codes    No documentation.               Expected Discharge Date and Time     Expected Discharge Date Expected Discharge Time    Nov 2, 2021             Ying Mcfarland RN

## 2021-11-02 NOTE — PROGRESS NOTES
Name: Marcella Stephens ADMIT: 10/22/2021   : 1923  PCP: Noemi Chester MD    MRN: [id number] LOS: 6 days   AGE/SEX: 89 y.o. female  ROOM: [facility]     Subjective   Subjective   Has complaints of mouth pain today, mucous membranes intact no oral lesions present.  No events overnight.    Review of Systems   Constitutional: Negative for chills and fever.   HENT:        Mouth pain   Respiratory: Negative for cough and shortness of breath.    Gastrointestinal: Negative for abdominal pain, diarrhea, nausea and vomiting.   Genitourinary: Negative for difficulty urinating and dysuria.   Musculoskeletal: Positive for gait problem. Negative for back pain.   Neurological: Negative for dizziness and headaches.   Psychiatric/Behavioral: Negative for agitation and behavioral problems.        Objective   Objective   Vital Signs  Temp:  [97.2 °F (36.2 °C)-98.9 °F (37.2 °C)] 97.8 °F (36.6 °C)  Heart Rate:  [68-91] 91  Resp:  [16] 16  BP: ()/(51-76) 89/51  SpO2:  [93 %-96 %] 95 %  on   ;   Device (Oxygen Therapy): room air  Body mass index is 20.12 kg/m².  Physical Exam  Vitals and nursing note reviewed.   Constitutional:       General: She is not in acute distress.     Appearance: She is ill-appearing. She is not toxic-appearing.      Comments: Frail, chronically ill appearing   HENT:      Head: Normocephalic.      Mouth/Throat:      Mouth: Mucous membranes are moist.   Eyes:      Conjunctiva/sclera: Conjunctivae normal.   Cardiovascular:      Rate and Rhythm: Normal rate and regular rhythm.   Pulmonary:      Effort: Pulmonary effort is normal. No respiratory distress.      Breath sounds: No wheezing or rales.   Abdominal:      General: Bowel sounds are normal.      Palpations: Abdomen is soft.   Musculoskeletal:         General: Swelling (trace BLE) present.      Cervical back: Neck supple.      Comments: Trace BLE   Skin:     General: Skin is warm and dry.   Neurological:      Mental Status: She is alert.  Mental status is at baseline.      Comments: Confused at times   Psychiatric:         Mood and Affect: Mood normal.         Behavior: Behavior normal.         Results Review     I reviewed the patient's new clinical results.  Results from last 7 days   Lab Units 10/31/21  0634 10/30/21  0633   WBC 10*3/mm3 6.16 8.29   HEMOGLOBIN g/dL 8.8* 9.8*   PLATELETS 10*3/mm3 315 374     Results from last 7 days   Lab Units 10/31/21  0634 10/30/21  0633 10/29/21  0804 10/28/21  0649 10/27/21  2055 10/27/21  0926   SODIUM mmol/L 142 140 144 139  --  141   POTASSIUM mmol/L 3.8 3.6 3.7 4.2   < > 3.3*   CHLORIDE mmol/L 108* 105 110* 111*  --  110*   CO2 mmol/L 29.1* 27.6 28.6 21.0*  --  24.3   BUN mg/dL 2* 3* 3* 2*  --  5*   CREATININE mg/dL 0.29* 0.30* 0.21* <0.17*  --  0.28*   GLUCOSE mg/dL 83 104* 86 86  --  93   EGFR IF NONAFRICN AM mL/min/1.73 >150 >150 >150  --   --  >150    < > = values in this interval not displayed.         Results from last 7 days   Lab Units 10/31/21  0634 10/30/21  0633 10/29/21  0804 10/28/21  2157 10/28/21  0649 10/27/21  2055 10/27/21  0926 10/27/21  0926 10/26/21  2248   CALCIUM mg/dL 7.7* 8.0* 7.8*  --  7.3*  --    < > 7.4*  --    MAGNESIUM mg/dL  --   --  1.9  --  2.0  --   --  2.2  --    PHOSPHORUS mg/dL  --   --   --  2.3*  --  2.0*  --   --  1.8*    < > = values in this interval not displayed.       COVID19   Date Value Ref Range Status   10/23/2021 Not Detected Not Detected - Ref. Range Final   08/27/2021 Not Detected Not Detected - Ref. Range Final     No results found for: HGBA1C, POCGLU    CT Abdomen Pelvis With & Without Contrast  CT ABDOMEN AND PELVIS WITH/WITHOUT CONTRAST     Radiation dose reduction techniques were utilized, including automated  exposure control and exposure modulation based on body size.     CLINICAL INFORMATION: 89-year-old female with nausea, vomiting, and  hematuria.     COMPARISON: None.      FINDINGS: There is cardiac enlargement, bilateral free-flowing  pleural  effusions and diffuse body wall edema. There is bibasilar atelectasis.     Both kidneys are satisfactory in size and shape, no renal mass  identified. There are 2 tiny calculi within the right kidney, the  largest is 2 mm. There is a tiny similar size calculus in upper pole of  the left kidney. No obstructive uropathy. The urinary bladder is  satisfactory in appearance, no intraluminal defect or bladder calculus  identified.     The uterus is small in size, appropriate for age, no adnexal  abnormality.     There is a large amount of dense fecal material within the rectum  consistent with fecal impaction, there is rectal wall thickening as well  as edema in the presacral space. There is diverticulosis of the colon,  no indication of acute diverticulitis. The small bowel is satisfactory  in appearance.     The stomach is collapsed, there is a moderate size hiatal hernia.     There are several hepatic cysts, the largest 12 mm. The gallbladder is  unremarkable. No biliary duct dilatation. The pancreas is within normal  limits. The spleen is satisfactory in size. Both adrenal glands have a  typical appearance. Diameter of the aorta is within normal limits, there  is atherosclerotic plaque formation.     CONCLUSION:  1. Rectal fecal impaction with rectal wall thickening and presacral  edema.  2. Diverticulosis of the colon.  3. Bilateral tiny nonobstructing renal calculi.  4. Bilateral pleural effusions, cardiac enlargement and diffuse body  wall edema.  5. Hiatal hernia.        This report was finalized on 10/29/2021 12:36 PM by Dr. Chet Spears M.D.       Scheduled Medications  apixaban, 2.5 mg, Oral, Q12H  aspirin, 81 mg, Oral, Daily  galantamine, 8 mg, Oral, Daily  lansoprazole, 30 mg, Oral, BID AC  polyethylene glycol, 17 g, Oral, Daily  rosuvastatin, 20 mg, Oral, Nightly  senna-docusate sodium, 2 tablet, Oral, Daily  sodium chloride, 10 mL, Intravenous, Q12H  traZODone, 50 mg, Oral, Nightly  vitamin  B-12, 1,000 mcg, Oral, Daily    Infusions   Diet  Diet Regular; GI Soft       Assessment/Plan     Active Hospital Problems    Diagnosis  POA   • **Acute UTI [N39.0]  Yes   • Postprandial vomiting [R11.10]  Yes   • Hiatal hernia [K44.9]  Yes   • Hypokalemia [E87.6]  Yes   • History of DVT (deep vein thrombosis) [Z86.718]  Not Applicable   • Elevated troponin [R77.8]  Yes   • Age-related physical debility [R54]  Yes   • Recent cerebrovascular accident (CVA) [Z86.73]  Yes   • Anemia [D64.9]  Yes   • Vitamin D deficiency [E55.9]  Yes   • Alzheimer's dementia (HCC) [G30.9, F02.80]  Yes      Resolved Hospital Problems   No resolved problems to display.       89-year-old with history of recent stroke without residual deficit and dementia who was brought in by EMS after being found down at home. She is admitted for a UTI.      Elevated troponin, no chest pain but she did have reoccurring vomitingm postprandial,  family thinks this is likely related to severe reflux:  -Repeated tropoin due to GI symptoms; down to 0.039-->0.035, previously 0.120, 0.110  -EKG t wave changes  -already on aspirin, eliquis and crestor  -Continue aspirin for now  -Appreciate Cardiology assistance; no further workup     UTI  -S/P 5 days ceftriaxone for +proteus infection     Postprandial vomiting:  -Appreciate GI assistance. KUB w/o evidence of obstruction. EGD last month performed for hematemesis reported small hiatal hernia; no evidence of bleeding. CT scan reviewed  -Now tolerating GI soft diet, having adequate bowel movements  -Report of some blood in her stool, now resolved. Eliquis decreased to 2.5 twice daily per Cardiology recommendation     Dementia  -Continue galantamine     History of DVT: On Eliquis    History of stroke without residual deficit: Continue aspirin and statin     Anxiety:  -PRN hydroxyzine      Hypokalemia:  -Improved w/repletion     Nutrition following; monitor BUN/Cr     · Eliquis (home med) for DVT  prophylaxis.  · Limited code (no CPR, no intubation).  · Discussed with patient, nursing staff, CCP.  · Anticipate discharge to SNU facility once precert obtained- hopefully tomorrow       NILS Luke  [facility]  11/02/21  15:16 EDT

## 2021-11-03 ENCOUNTER — APPOINTMENT (OUTPATIENT)
Dept: GENERAL RADIOLOGY | Facility: HOSPITAL | Age: 86
End: 2021-11-03

## 2021-11-03 PROBLEM — E87.6 HYPOKALEMIA: Status: RESOLVED | Noted: 2021-10-27 | Resolved: 2021-11-03

## 2021-11-03 PROBLEM — R79.89 ELEVATED TROPONIN: Status: RESOLVED | Noted: 2021-10-23 | Resolved: 2021-11-03

## 2021-11-03 PROBLEM — R11.10 POSTPRANDIAL VOMITING: Status: RESOLVED | Noted: 2021-10-27 | Resolved: 2021-11-03

## 2021-11-03 PROBLEM — R77.8 ELEVATED TROPONIN: Status: RESOLVED | Noted: 2021-10-23 | Resolved: 2021-11-03

## 2021-11-03 LAB
ANION GAP SERPL CALCULATED.3IONS-SCNC: 5 MMOL/L (ref 5–15)
BASOPHILS # BLD AUTO: 0.06 10*3/MM3 (ref 0–0.2)
BASOPHILS NFR BLD AUTO: 0.9 % (ref 0–1.5)
BUN SERPL-MCNC: 12 MG/DL (ref 8–23)
BUN/CREAT SERPL: 35.3 (ref 7–25)
CALCIUM SPEC-SCNC: 7.7 MG/DL (ref 8.2–9.6)
CHLORIDE SERPL-SCNC: 104 MMOL/L (ref 98–107)
CO2 SERPL-SCNC: 30 MMOL/L (ref 22–29)
CREAT SERPL-MCNC: 0.34 MG/DL (ref 0.57–1)
DEPRECATED RDW RBC AUTO: 52.5 FL (ref 37–54)
EOSINOPHIL # BLD AUTO: 0.29 10*3/MM3 (ref 0–0.4)
EOSINOPHIL NFR BLD AUTO: 4.4 % (ref 0.3–6.2)
ERYTHROCYTE [DISTWIDTH] IN BLOOD BY AUTOMATED COUNT: 15.6 % (ref 12.3–15.4)
GFR SERPL CREATININE-BSD FRML MDRD: >150 ML/MIN/1.73
GLUCOSE BLDC GLUCOMTR-MCNC: 129 MG/DL (ref 70–130)
GLUCOSE SERPL-MCNC: 102 MG/DL (ref 65–99)
HCT VFR BLD AUTO: 28.3 % (ref 34–46.6)
HGB BLD-MCNC: 8.7 G/DL (ref 12–15.9)
IMM GRANULOCYTES # BLD AUTO: 0.03 10*3/MM3 (ref 0–0.05)
IMM GRANULOCYTES NFR BLD AUTO: 0.5 % (ref 0–0.5)
LYMPHOCYTES # BLD AUTO: 2.2 10*3/MM3 (ref 0.7–3.1)
LYMPHOCYTES NFR BLD AUTO: 33.2 % (ref 19.6–45.3)
MCH RBC QN AUTO: 28.4 PG (ref 26.6–33)
MCHC RBC AUTO-ENTMCNC: 30.7 G/DL (ref 31.5–35.7)
MCV RBC AUTO: 92.5 FL (ref 79–97)
MONOCYTES # BLD AUTO: 0.81 10*3/MM3 (ref 0.1–0.9)
MONOCYTES NFR BLD AUTO: 12.2 % (ref 5–12)
NEUTROPHILS NFR BLD AUTO: 3.24 10*3/MM3 (ref 1.7–7)
NEUTROPHILS NFR BLD AUTO: 48.8 % (ref 42.7–76)
NRBC BLD AUTO-RTO: 0 /100 WBC (ref 0–0.2)
PLATELET # BLD AUTO: 249 10*3/MM3 (ref 140–450)
PMV BLD AUTO: 9.3 FL (ref 6–12)
POTASSIUM SERPL-SCNC: 3.4 MMOL/L (ref 3.5–5.2)
RBC # BLD AUTO: 3.06 10*6/MM3 (ref 3.77–5.28)
SODIUM SERPL-SCNC: 139 MMOL/L (ref 136–145)
WBC # BLD AUTO: 6.63 10*3/MM3 (ref 3.4–10.8)

## 2021-11-03 PROCEDURE — 80048 BASIC METABOLIC PNL TOTAL CA: CPT | Performed by: NURSE PRACTITIONER

## 2021-11-03 PROCEDURE — 85025 COMPLETE CBC W/AUTO DIFF WBC: CPT | Performed by: NURSE PRACTITIONER

## 2021-11-03 PROCEDURE — 97530 THERAPEUTIC ACTIVITIES: CPT

## 2021-11-03 PROCEDURE — 74018 RADEX ABDOMEN 1 VIEW: CPT

## 2021-11-03 PROCEDURE — 82962 GLUCOSE BLOOD TEST: CPT

## 2021-11-03 PROCEDURE — 63710000001 ONDANSETRON PER 8 MG: Performed by: NURSE PRACTITIONER

## 2021-11-03 PROCEDURE — 97110 THERAPEUTIC EXERCISES: CPT | Performed by: OCCUPATIONAL THERAPIST

## 2021-11-03 RX ADMIN — NYSTATIN 500000 UNITS: 100000 SUSPENSION ORAL at 11:32

## 2021-11-03 RX ADMIN — Medication 1000 MCG: at 08:24

## 2021-11-03 RX ADMIN — APIXABAN 2.5 MG: 2.5 TABLET, FILM COATED ORAL at 08:24

## 2021-11-03 RX ADMIN — NYSTATIN 500000 UNITS: 100000 SUSPENSION ORAL at 21:02

## 2021-11-03 RX ADMIN — ASPIRIN 81 MG: 81 TABLET, COATED ORAL at 08:24

## 2021-11-03 RX ADMIN — ROSUVASTATIN CALCIUM 20 MG: 20 TABLET, FILM COATED ORAL at 21:02

## 2021-11-03 RX ADMIN — TRAZODONE HYDROCHLORIDE 50 MG: 50 TABLET ORAL at 21:01

## 2021-11-03 RX ADMIN — APIXABAN 2.5 MG: 2.5 TABLET, FILM COATED ORAL at 21:02

## 2021-11-03 RX ADMIN — GALANTAMINE 8 MG: 4 TABLET, FILM COATED ORAL at 08:24

## 2021-11-03 RX ADMIN — LANSOPRAZOLE 30 MG: KIT at 18:05

## 2021-11-03 RX ADMIN — NYSTATIN 500000 UNITS: 100000 SUSPENSION ORAL at 18:05

## 2021-11-03 RX ADMIN — ONDANSETRON 4 MG: 4 TABLET, FILM COATED ORAL at 08:25

## 2021-11-03 RX ADMIN — NYSTATIN 500000 UNITS: 100000 SUSPENSION ORAL at 08:25

## 2021-11-03 RX ADMIN — LANSOPRAZOLE 30 MG: KIT at 07:11

## 2021-11-03 RX ADMIN — Medication 3 MG: at 21:26

## 2021-11-03 NOTE — PLAN OF CARE
"  Problem: Adult Inpatient Plan of Care  Goal: Plan of Care Review  Recent Flowsheet Documentation  Taken 11/3/2021 2690 by Rebekah Thomson, PT  Progress: no change  Plan of Care Reviewed With: patient  Outcome Summary: Pt agreeable to work with PT this date. Pt was CGA for sup<>sit with inc'd time and cues. Pt was modA x2 for STS to RW, and once attempting gait, pt became very fearful and panicked. Pt yells out \"oh my god!\" and does not participate well with PT/OT for gait. Once seated in chair, pt is calm. Pt continues to benefit from skilled PT, akiuh struggles with participation d/t FOF. Recommending D/C to SNF.   Goal Outcome Evaluation:                 "

## 2021-11-03 NOTE — THERAPY TREATMENT NOTE
Patient Name: Marcella Stephens  : 1923    MRN: [id number]                              Today's Date: 11/3/2021       Admit Date: 10/22/2021    Visit Dx:     ICD-10-CM ICD-9-CM   1. Acute UTI  N39.0 599.0   2. Elevated troponin  R77.8 790.6   3. Anemia, unspecified type  D64.9 285.9   4. Weakness  R53.1 780.79   5. Unable to ambulate  R26.2 719.7   6. Right hip pain  M25.551 719.45   7. Acute pain of right knee  M25.561 719.46   8. Dementia without behavioral disturbance, unspecified dementia type (HCC)  F03.90 294.20   9. Abnormal TSH  R79.89 790.6     Patient Active Problem List   Diagnosis   • Closed fracture of right hip (HCC)   • Fall as cause of accidental injury at home as place of occurrence   • Alzheimer's dementia (formerly [facility])   • Anemia   • Vitamin D deficiency   • Weakness of right upper extremity   • Acute retention of urine   • Hydronephrosis   • Acute deep vein thrombosis (DVT) of brachial vein of right upper extremity (formerly [facility])   • Recent cerebrovascular accident (CVA)   • DVT of lower extremity, bilateral (formerly [facility])   • Acute UTI   • History of DVT (deep vein thrombosis)   • Age-related physical debility   • Hiatal hernia     Past Medical History:   Diagnosis Date   • Acute embolism and thombos unsp deep veins of low extrm, bi (formerly [facility])    • Prediabetes    • Vascular dementia without behavioral disturbance (formerly [facility])      Past Surgical History:   Procedure Laterality Date   • BACK SURGERY     • FEMUR IM NAILING/RODDING Right 2021    Procedure: FEMUR INTRAMEDULLARY NAILING/RODDING;  Surgeon: Louis Scruggs MD;  Location: [facility];  Service: Orthopedics;  Laterality: Right;   • KNEE SURGERY        General Information     Row Name 21 1547          OT Time and Intention    Document Type therapy note (daily note)  -     Mode of Treatment individual therapy; occupational therapy  -     Row Name 21 1547          General Information    Existing Precautions/Restrictions fall  -     Row  Name 11/03/21 1547          Cognition    Orientation Status (Cognition) oriented to; person; place  -     Row Name 11/03/21 1547          Safety Issues, Functional Mobility    Impairments Affecting Function (Mobility) balance; coordination; strength; endurance/activity tolerance  -           User Key  (r) = Recorded By, (t) = Taken By, (c) = Cosigned By    Initials Name Provider Type    Maryjane Conley OTR Occupational Therapist                 Mobility/ADL's     Row Name 11/03/21 1547          Transfers    Transfers sit-stand transfer  -     Comment (Transfers) pt working w PT. partial co treat w OT. A of two then third person providing A and VC as pt worried tog et to chair  -     Row Name 11/03/21 1547          Toileting Assessment/Training    [place] Level (Toileting) toileting skills; change pad/brief; set up; dependent (less than 25% patient effort)  -     Position (Toileting) supported sitting; supported standing  -           User Key  (r) = Recorded By, (t) = Taken By, (c) = Cosigned By    Initials Name Provider Type    Maryjane Conley OTR Occupational Therapist               Obj/Interventions     Row Name 11/03/21 1548          Shoulder (Therapeutic Exercise)    Shoulder (Therapeutic Exercise) AROM (active range of motion)  -     Shoulder AROM (Therapeutic Exercise) right; left; flexion; extension; scapular protraction; scapular retraction; 10 repetitions; 2 sets  -     Row Name 11/03/21 1548          Elbow/Forearm (Therapeutic Exercise)    Elbow/Forearm (Therapeutic Exercise) AROM (active range of motion)  -     Elbow/Forearm AROM (Therapeutic Exercise) bilateral; flexion; extension; supination; pronation; sitting; 10 repetitions; 2 sets  -     Row Name 11/03/21 1548          Therapeutic Exercise    Therapeutic Exercise elbow/forearm; shoulder  -           User Key  (r) = Recorded By, (t) = Taken By, (c) = Cosigned By    Initials Name Provider Type      Maryjane Henderson OTR Occupational Therapist               Goals/Plan    No documentation.                Clinical Impression     Row Name 11/03/21 1549          Pain Scale: Numbers Pre/Post-Treatment    Pretreatment Pain Rating 0/10 - no pain  -KP     Posttreatment Pain Rating 0/10 - no pain  -KP     Row Name 11/03/21 1545          Plan of Care Review    Plan of Care Reviewed With patient  -     Progress no change  -KP     Outcome Summary pt completed OT session and partial co treat w OT/PT. OT provided VC and some A getting pt to chair w PT and PT tech as pt became fearful w tsf and worried. Pt was dep w brief change and completed ROM UE ex in chair 10x2 to incr strength and endurance. cont OT to incr ADL. strength, balance, and tsf  -KP     Row Name 11/03/21 1541          Positioning and Restraints    Pre-Treatment Position in bed  -KP     Post Treatment Position chair  -KP     In Chair reclined; call light within reach; encouraged to call for assist; exit alarm on  -KP           User Key  (r) = Recorded By, (t) = Taken By, (c) = Cosigned By    Initials Name Provider Type    Maryjane Conley OTR Occupational Therapist               Outcome Measures     Row Name 11/03/21 3790          How much help from another is currently needed...    Putting on and taking off regular lower body clothing? 1  -KP     Bathing (including washing, rinsing, and drying) 1  -KP     Toileting (which includes using toilet bed pan or urinal) 1  -KP     Putting on and taking off regular upper body clothing 2  -KP     Taking care of personal grooming (such as brushing teeth) 2  -KP     Eating meals 2  -KP     AM-PAC 6 Clicks Score (OT) 9  -KP           User Key  (r) = Recorded By, (t) = Taken By, (c) = Cosigned By    Initials Name Provider Type    Maryjane Conley OTR Occupational Therapist                Occupational Therapy Education                 Title: PT OT SLP Therapies (Resolved)     Topic: Occupational  Therapy (Resolved)     Point: ADL training (Resolved)     Description:   Instruct learner(s) on proper safety adaptation and remediation techniques during self care or transfers.   Instruct in proper use of assistive devices.              Learning Progress Summary           Patient Acceptance, E,TB, VU by  at 11/3/2021 0922    Acceptance, E,D, VU,DU by  at 10/25/2021 1243    Comment: cherelle of OT,p nova  of care, xfer tech.                   Point: Precautions (Resolved)     Description:   Instruct learner(s) on prescribed precautions during self-care and functional transfers.              Learning Progress Summary           Patient Acceptance, E,TB, VU by  at 11/3/2021 0922    Acceptance, E,D, VU,DU by  at 10/25/2021 1243    Comment: cherelle of OT,p nova  of care, xfer tech.                   Point: Body mechanics (Resolved)     Description:   Instruct learner(s) on proper positioning and spine alignment during self-care, functional mobility activities and/or exercises.              Learning Progress Summary           Patient Acceptance, E,TB, VU by  at 11/3/2021 0922    Acceptance, E,D, VU,DU by  at 10/25/2021 1243    Comment: cherelle of OT,p nova  of care, xfer tech.                               User Key     Initials Effective Dates Name Provider Type Discipline     06/16/21 -  Elvira Jerome OTR Occupational Therapist [facility] 06/10/21 -  Karin Nicholas, RN Registered Nurse Nurse              OT Recommendation and Plan     Plan of Care Review  Plan of Care Reviewed With: patient  Progress: no change  Outcome Summary: pt completed OT session and partial co treat w OT/PT. OT provided VC and some A getting pt to chair w PT and PT tech as pt became fearful w tsf and worried. Pt was dep w brief change and completed ROM UE ex in chair 10x2 to incr strength and endurance. cont OT to incr ADL. strength, balance, and tsf     Time Calculation:    Time Calculation- OT     Row Name 11/03/21 1550             Time  Calculation- OT    OT Start Time 1339  -      OT Stop Time 1352  -      OT Time Calculation (min) 13 min  -      Total Timed Code Minutes- OT 13 minute(s)  -      OT Received On 11/03/21  -      OT - Next Appointment 11/04/21  -              Timed Charges    83037 - OT Therapeutic Exercise Minutes 13  -              Total Minutes    Timed Charges Total Minutes 13  -       Total Minutes 13  -            User Key  (r) = Recorded By, (t) = Taken By, (c) = Cosigned By    Initials Name Provider Type    Maryjane Conley OTR Occupational Therapist              Therapy Charges for Today     Code Description Service Date Service Provider Modifiers Qty    58004404993 HC OT THER PROC EA 15 MIN 11/3/2021 Maryjane Henderson OTR GO 1               SAV Gutierrez  11/3/2021

## 2021-11-03 NOTE — DISCHARGE SUMMARY
[facility]    Date of Discharge:  11/3/2021    PCP: Noemi Chester MD    Discharge Diagnosis:   Active Hospital Problems    Diagnosis  POA   • **Acute UTI [N39.0]  Yes   • Hiatal hernia [K44.9]  Yes   • History of DVT (deep vein thrombosis) [Z86.718]  Not Applicable   • Age-related physical debility [R54]  Yes   • Recent cerebrovascular accident (CVA) [Z86.73]  Yes   • Anemia [D64.9]  Yes   • Vitamin D deficiency [E55.9]  Yes   • Alzheimer's dementia (HCC) [G30.9, F02.80]  Yes      Resolved Hospital Problems    Diagnosis Date Resolved POA   • Postprandial vomiting [R11.10] 11/03/2021 Yes   • Hypokalemia [E87.6] 11/03/2021 Yes   • Elevated troponin [R77.8] 11/03/2021 Yes          Consults     Date and Time Order Name Status Description    10/30/2021  2:31 PM Inpatient Cardiology Consult Completed     10/26/2021 10:29 AM Inpatient Gastroenterology Consult Completed     10/22/2021 11:17 PM [facility] (on-call MD unless specified) Details Completed         Hospital Course  89 y.o. female initially admitted with complaints of generalized weakness.  This was found to be secondary to acute UTI.  Urine culture revealed Proteus Mirabella severe Catherman specimen and patient was treated successfully with 3 days worth of IV Rocephin and no further antibiotics are warranted.  She did have elevated troponins but no complaints of chest pain.  Cardiology saw in consultation and appreciate their assistance.  She is to maintain on low-dose aspirin and her Eliquis dosing has been reduced to 2.5 mg twice daily.  No further cardiac work-up was endorsed from their perspective.  GI also saw in consultation given some postprandial vomiting.  She does have a small hiatal hernia demonstrated on previous admission EGD.  GI did see in consultation and no further work-up was endorsed.  They suggest continuation of bowel regimen of which she will continue on MiraLAX with stool softeners.   Patient tolerating diet with no further emesis or abdominal pain or complaints of nausea.  GI had subsequently signed off and are okay with disposition from their perspective.  All the above is compounded by patient's past history of dementia but no behavior disorder noted.  She does have a past history of stroke without residual deficit.  Electrolyte imbalances with hypokalemia have been replaced.  Long-term prognosis obviously quite guarded in this 89-year-old patient.  Per review of the chart, daughter seems involved and we will transition the this patient over to subacute rehab pending [facility] coordination.  I discussed the case with RN since daughter was not present at the time of my exam this a.m. to ensure a safe transition.  All questions answered to the patient to the best my ability.        Condition on Discharge: Improved.     Temp:  [97.8 °F (36.6 °C)-98.7 °F (37.1 °C)] 97.9 °F (36.6 °C)  Heart Rate:  [72-94] 72  Resp:  [16-20] 16  BP: (89-98)/(51-58) 93/53  Body mass index is 20.12 kg/m².    Physical Exam  Constitutional:       Comments: Conversational and pleasant appears in absolutely no distress   HENT:      Head: Normocephalic.      Nose: Nose normal.      Mouth/Throat:      Mouth: Mucous membranes are moist.      Pharynx: Oropharynx is clear.   Eyes:      General: No scleral icterus.     Conjunctiva/sclera: Conjunctivae normal.   Cardiovascular:      Rate and Rhythm: Normal rate and regular rhythm.   Pulmonary:      Effort: Pulmonary effort is normal. No respiratory distress.      Breath sounds: Normal breath sounds.   Abdominal:      General: Bowel sounds are normal. There is no distension.      Palpations: Abdomen is soft.      Tenderness: There is no abdominal tenderness. There is no guarding or rebound.   Musculoskeletal:         General: No swelling.   Skin:     General: Skin is warm and dry.      Coloration: Skin is not jaundiced.   Neurological:      Mental Status: She is alert.      Cranial  Nerves: No cranial nerve deficit.     [unfilled]    Disposition: Skilled Nursing Facility (DC - External)       Discharge Medications      Changes to Medications      Instructions Start Date   apixaban 2.5 MG tablet tablet  Commonly known as: MIGUEL  What changed:   · medication strength  · how much to take  · how to take this  · when to take this   2.5 mg, Oral, Every 12 Hours Scheduled         Continue These Medications      Instructions Start Date   acetaminophen 325 MG tablet  Commonly known as: TYLENOL   650 mg, Oral, Every 4 Hours PRN      aspirin 81 MG EC tablet   81 mg, Oral, Daily      Cyanocobalamin 500 MCG sublingual tablet   1,000 mcg      docusate sodium 100 MG capsule   100 mg, Oral, 2 Times Daily      famotidine 20 MG tablet  Commonly known as: PEPCID   20 mg, Oral, Daily      galantamine 8 MG tablet  Commonly known as: RAZADYNE   8 mg, Oral, Daily      lidocaine 5 %  Commonly known as: LIDODERM   1 patch, Transdermal, Every 24 Hours Scheduled, Remove & Discard patch within 12 hours or as directed by MD      magnesium hydroxide 400 MG/5ML suspension  Commonly known as: MILK OF MAGNESIA   Oral, Daily PRN      melatonin 3 MG tablet   3 mg, Oral, Nightly PRN      multivitamin with minerals tablet tablet   Oral, Daily      ondansetron 4 MG tablet  Commonly known as: ZOFRAN   4 mg, Oral, Every 6 Hours PRN      polyethylene glycol 17 g packet  Commonly known as: MIRALAX   17 g, Oral, Daily      rosuvastatin 20 MG tablet  Commonly known as: CRESTOR   20 mg, Oral, Nightly      traZODone 50 MG tablet  Commonly known as: DESYREL   No dose, route, or frequency recorded.      vitamin D 1.25 MG (75040 UT) capsule capsule  Commonly known as: ERGOCALCIFEROL   50,000 Units, Oral, Every 7 Days, X 4 weeks         Stop These Medications    meloxicam 15 MG tablet  Commonly known as: MOBIC     oxyCODONE 5 MG immediate release tablet  Commonly known as: ROXICODONE     pantoprazole 40 MG EC tablet  Commonly known as:  PROTONIX             Additional Instructions for the Follow-ups that You Need to Schedule     Discharge Follow-up with PCP   As directed       Currently Documented PCP:    Noemi Chester MD    PCP Phone Number:    [phone]     Follow Up Details: PCP post rehab.  Cardiology and GI as needed            Follow-up Information     Noemi Chester MD .    Specialty: Internal Medicine  Why: PCP post rehab.  Cardiology and GI as needed  Contact information:  [address] [place] [address]  [phone]                        Pending Labs     Order Current Status    Basic Metabolic Panel Collected (11/03/21 0715)    CBC & Differential Collected (11/03/21 0715)    CBC Auto Differential Collected (11/03/21 0715)         Alireza De Leon MD  11/03/21  07:37 EDT    Discharge time spent greater than 30 minutes.

## 2021-11-03 NOTE — PLAN OF CARE
Goal Outcome Evaluation:  Plan of Care Reviewed With: patient           Outcome Summary: VSS, BP on the lower side, lg BM this shift, given meds crushed in apple sauce, Q2 turns provided, heels floated off bed, wound care to be done later today, mepilexs replaced and wound care to heel. medicated for anxiety, slept between care some.

## 2021-11-03 NOTE — PROGRESS NOTES
Continued Stay Note  [facility]     Patient Name: Marcella Stephens  MRN: [id number]  Today's Date: 11/3/2021    Admit Date: 10/22/2021     Discharge Plan     Row Name 11/03/21 0743       Plan    Plan [facility] PENDING Pre-Cert    Plan Comments Msg sent ot Blanca with Signature regarding Pre-cert               Discharge Codes    No documentation.               Expected Discharge Date and Time     Expected Discharge Date Expected Discharge Time    Nov 3, 2021             Ying Mcfarlnad RN

## 2021-11-03 NOTE — PLAN OF CARE
Goal Outcome Evaluation:  Plan of Care Reviewed With: patient        Progress: no change  Outcome Summary: pt completed OT session and partial co treat w OT/PT. OT provided VC and some A getting pt to chair w PT and PT tech as pt became fearful w tsf and worried. Pt was dep w brief change and completed ROM UE ex in chair 10x2 to incr strength and endurance. cont OT to incr ADL. strength, balance, and tsf  OT wore all PPE, washed hands before/after

## 2021-11-03 NOTE — PROGRESS NOTES
Called by RN shortly after breakfast and apparently patient vomited her breakfast this morning    I went ahead and canceled the discharge and switched her back to full liquid diet and asked RN to make GI aware of this.  I am not sure what more can be done for this.  Would not really endorse a feeding tube in a 89-year-old patient.  Previous EGD showed some small hiatal hernia likely made worse by recent constipation.  We will need to discuss his case further with daughter    Discharge canceled    Call placed to daughter Bonita Reddy at [phone] and there was no answer.  The mailbox was full so no voicemail left.  I called to give her an update but will check back tomorrow    >30min spent today coordinating care

## 2021-11-03 NOTE — THERAPY TREATMENT NOTE
Patient Name: Marcella Stephens  : 1923    MRN: [id number]                              Today's Date: 11/3/2021       Admit Date: 10/22/2021    Visit Dx:     ICD-10-CM ICD-9-CM   1. Acute UTI  N39.0 599.0   2. Elevated troponin  R77.8 790.6   3. Anemia, unspecified type  D64.9 285.9   4. Weakness  R53.1 780.79   5. Unable to ambulate  R26.2 719.7   6. Right hip pain  M25.551 719.45   7. Acute pain of right knee  M25.561 719.46   8. Dementia without behavioral disturbance, unspecified dementia type (HCC)  F03.90 294.20   9. Abnormal TSH  R79.89 790.6     Patient Active Problem List   Diagnosis   • Closed fracture of right hip (HCC)   • Fall as cause of accidental injury at home as place of occurrence   • Alzheimer's dementia ([facility])   • Anemia   • Vitamin D deficiency   • Weakness of right upper extremity   • Acute retention of urine   • Hydronephrosis   • Acute deep vein thrombosis (DVT) of brachial vein of right upper extremity ([facility])   • Recent cerebrovascular accident (CVA)   • DVT of lower extremity, bilateral ([facility])   • Acute UTI   • History of DVT (deep vein thrombosis)   • Age-related physical debility   • Hiatal hernia     Past Medical History:   Diagnosis Date   • Acute embolism and thombos unsp deep veins of low extrm, bi ([facility])    • Prediabetes    • Vascular dementia without behavioral disturbance ([facility]ly [facility])      Past Surgical History:   Procedure Laterality Date   • BACK SURGERY     • FEMUR IM NAILING/RODDING Right 2021    Procedure: FEMUR INTRAMEDULLARY NAILING/RODDING;  Surgeon: Louis Scruggs MD;  Location: [facility];  Service: Orthopedics;  Laterality: Right;   • KNEE SURGERY        General Information     Row Name 21 1546          Physical Therapy Time and Intention    Document Type therapy note (daily note)  -DB     Mode of Treatment individual therapy; physical therapy  -DB     Row Name 21 1546          General Information    Patient Profile Reviewed yes  -DB      "Existing Precautions/Restrictions fall  -DB           User Key  (r) = Recorded By, (t) = Taken By, (c) = Cosigned By    Initials Name Provider Type    Rebekah Stokes PT Physical Therapist               Mobility     Row Name 11/03/21 1547          Bed Mobility    Bed Mobility supine-sit  -DB     Supine-Sit [place] (Bed Mobility) contact guard; verbal cues; nonverbal cues (demo/gesture)  -DB     Assistive Device (Bed Mobility) bed rails; head of bed elevated  -DB     Row Name 11/03/21 1547          Sit-Stand Transfer    Sit-Stand [place] (Transfers) 2 person assist; moderate assist (50% patient effort); verbal cues; nonverbal cues (demo/gesture)  -DB     Assistive Device (Sit-Stand Transfers) walker, front-wheeled  -DB     Row Name 11/03/21 1547          Gait/Stairs (Locomotion)    [place] Level (Gait) 2 person assist; maximum assist (25% patient effort); verbal cues; nonverbal cues (demo/gesture)  -DB     Assistive Device (Gait) walker, front-wheeled  -DB     Distance in Feet (Gait) 5' to chair  -DB     Deviations/Abnormal Patterns (Gait) base of support, narrow; jorden decreased; festinating/shuffling; stride length decreased  -DB     Bilateral Gait Deviations forward flexed posture; heel strike decreased  -DB     Comment (Gait/Stairs) pt yells during gait \"oh my god!\" and is so fearful it impacts her participation  -DB           User Key  (r) = Recorded By, (t) = Taken By, (c) = Cosigned By    Initials Name Provider Type    Rebekah Stokes PT Physical Therapist               Obj/Interventions     Row Name 11/03/21 1548          Balance    Balance Assessment sitting static balance; sitting dynamic balance; standing static balance; standing dynamic balance  -DB     Static Sitting Balance WFL  -DB     Dynamic Sitting Balance mild impairment  -DB     Static Standing Balance severe impairment; supported  -DB     Dynamic Standing Balance severe impairment; supported  -DB     Balance " "Interventions sitting; standing; sit to stand  -DB           User Key  (r) = Recorded By, (t) = Taken By, (c) = Cosigned By    Initials Name Provider Type    DB Rebekah Thomson PT Physical Therapist               Goals/Plan    No documentation.                Clinical Impression     Row Name 11/03/21 1554 11/03/21 1549       Pain Scale: Numbers Pre/Post-Treatment    Pain Intervention(s) Ambulation/increased activity; Repositioned  -DB Ambulation/increased activity; Repositioned  -DB    Row Name 11/03/21 1554          Plan of Care Review    Plan of Care Reviewed With patient  -DB     Progress no change  -DB     Outcome Summary Pt agreeable to work with PT this date. Pt was CGA for sup<>sit with inc'd time and cues. Pt was modA x2 for STS to RW, and once attempting gait, pt became very fearful and panicked. Pt yells out \"oh my god!\" and does not participate well with PT/OT for gait. Once seated in chair, pt is calm. Pt continues to benefit from skilled PT, althaureliauh struggles with participation d/t FOF. Recommending D/C to SNF.  -DB     Row Name 11/03/21 1554 11/03/21 1549       Therapy Assessment/Plan (PT)    Rehab Potential (PT) fair, will monitor progress closely  -DB fair, will monitor progress closely  -DB    Row Name 11/03/21 1554 11/03/21 1549       Vital Signs    O2 Delivery Pre Treatment room air  -DB room air  -DB    O2 Delivery Intra Treatment room air  -DB room air  -DB    O2 Delivery Post Treatment room air  -DB room air  -DB    Pre Patient Position Supine  -DB Supine  -DB    Intra Patient Position Standing  -DB Standing  -DB    Post Patient Position Sitting  -DB Sitting  -DB    Row Name 11/03/21 1554          Positioning and Restraints    Pre-Treatment Position in bed  -DB     Post Treatment Position chair  -DB     In Chair sitting; call light within reach; encouraged to call for assist; exit alarm on; with OT  -DB           User Key  (r) = Recorded By, (t) = Taken By, (c) = Cosigned By    Initials Name " Provider Type    Rebekah Stokes, GUNNER Physical Therapist               Outcome Measures     Row Name 11/03/21 1553          How much help from another person do you currently need...    Turning from your back to your side while in flat bed without using bedrails? 3  -DB     Moving from lying on back to sitting on the side of a flat bed without bedrails? 3  -DB     Moving to and from a bed to a chair (including a wheelchair)? 2  -DB     Standing up from a chair using your arms (e.g., wheelchair, bedside chair)? 2  -DB     Climbing 3-5 steps with a railing? 1  -DB     To walk in hospital room? 2  -DB     AM-PAC 6 Clicks Score (PT) 13  -DB     Row Name 11/03/21 1553 11/03/21 1551       Functional Assessment    Outcome Measure Options AM-PAC 6 Clicks Basic Mobility (PT)  -DB AM-PAC 6 Clicks Basic Mobility (PT)  -DB          User Key  (r) = Recorded By, (t) = Taken By, (c) = Cosigned By    Initials Name Provider Type    Rebekah Stokes PT Physical Therapist                             Physical Therapy Education                 Title: PT OT SLP Therapies (In Progress)     Topic: Physical Therapy (In Progress)     Point: Mobility training (In Progress)     Learning Progress Summary           Patient Acceptance, E, NR by DB at 11/3/2021 1554    Acceptance, E,TB, VU by SALLY at 11/3/2021 0922    Acceptance, E,TB,D, VU,NR by NEIL at 11/2/2021 0925    Acceptance, E,TB,D, VU,NR by TREVER at 10/30/2021 1348    Acceptance, E, NR by AR at 10/29/2021 1510    Acceptance, E,TB,D, VU,NR by SM at 10/26/2021 1710    Acceptance, E,TB,D, VU,NR by SM at 10/25/2021 1209    Acceptance, E,TB, VU,NR by MT at 10/25/2021 0500    Acceptance, E,TB, VU,NR by MT at 10/24/2021 0531    Acceptance, E,TB,D, VU,DU,NR by  at 10/23/2021 1526                   Point: Home exercise program (In Progress)     Learning Progress Summary           Patient Acceptance, E, NR by DB at 11/3/2021 1554    Acceptance, E,TB, VU by  at 11/3/2021 0922    Acceptance,  E,TB,D, VU,NR by JM at 11/2/2021 0925    Acceptance, E, NR by AR at 10/29/2021 1510    Acceptance, E,TB,D, VU,NR by  at 10/26/2021 1710    Acceptance, E,TB,D, VU,NR by  at 10/25/2021 1209                   Point: Body mechanics (In Progress)     Learning Progress Summary           Patient Acceptance, E, NR by DB at 11/3/2021 1554    Acceptance, E,TB, VU by LH at 11/3/2021 0922    Acceptance, E,TB,D, VU,NR by JM at 11/2/2021 0925    Acceptance, E, NR by AR at 10/29/2021 1510    Acceptance, E,TB,D, VU,NR by  at 10/26/2021 1710    Acceptance, E,TB,D, VU,NR by  at 10/25/2021 1209                   Point: Precautions (In Progress)     Learning Progress Summary           Patient Acceptance, E, NR by DB at 11/3/2021 1554    Acceptance, E,TB, VU by  at 11/3/2021 0922    Acceptance, E,TB,D, VU,NR by  at 11/2/2021 0925    Acceptance, E, NR by AR at 10/29/2021 1510    Acceptance, E,TB,D, VU,NR by  at 10/26/2021 1710    Acceptance, E,TB,D, VU,NR by  at 10/25/2021 1209    Acceptance, E,TB, VU,NR by MT at 10/25/2021 0500    Acceptance, E,TB, VU,NR by MT at 10/24/2021 0531    Acceptance, E,TB,D, VU,DU,NR by  at 10/23/2021 1526                               User Key     Initials Effective Dates Name Provider Type Discipline    NEIL 03/07/18 -  Laura Dorsey, PTA Physical Therapy Assistant PT    MT 06/16/21 -  Pancho Abdi, RN Registered Nurse Nurse     06/16/21 -  Thania Monzon, PT Physical Therapist PT    AR 06/16/21 -  Manuela Berman, PT Physical Therapist PT    SM 03/07/18 -  Kellie Dorsey, PTA Physical Therapy Assistant PT    DB 06/16/21 -  Rebekah Thomson, PT Physical Therapist PT    MH 05/26/20 -  Lisette Shane, PT Physical Therapist PT     06/10/21 -  Karin Nicholas, RIYA Registered Nurse Nurse              PT Recommendation and Plan     Plan of Care Reviewed With: patient  Progress: no change  Outcome Summary: Pt agreeable to work with PT this date. Pt was CGA for sup<>sit with inc'd  "time and cues. Pt was modA x2 for STS to RW, and once attempting gait, pt became very fearful and panicked. Pt yells out \"oh my god!\" and does not participate well with PT/OT for gait. Once seated in chair, pt is calm. Pt continues to benefit from skilled PT, althgouh struggles with participation d/t FOF. Recommending D/C to SNF.     Time Calculation:    PT Charges     Row Name 11/03/21 1557             Time Calculation    Start Time 1334  -DB      Stop Time 1346  -DB      Time Calculation (min) 12 min  -DB      PT Received On 11/03/21  -DB      PT - Next Appointment 11/04/21  -DB              Time Calculation- PT    Total Timed Code Minutes- PT 12 minute(s)  -DB            User Key  (r) = Recorded By, (t) = Taken By, (c) = Cosigned By    Initials Name Provider Type    DB Rebekah Thomson, GUNNER Physical Therapist              Therapy Charges for Today     Code Description Service Date Service Provider Modifiers Qty    [id number]  PT THERAPEUTIC ACT EA 15 MIN 11/3/2021 Rebekah Thomson PT GP 1          PT G-Codes  Outcome Measure Options: AM-PAC 6 Clicks Basic Mobility (PT)  AM-PAC 6 Clicks Score (PT): 13  AM-PAC 6 Clicks Score (OT): 9  Modified [place] Scale: 5 - Severe disability.  Bedridden, incontinent, and requiring constant nursing care and attention.    Rebekah Thomson PT  11/3/2021    "

## 2021-11-03 NOTE — PLAN OF CARE
Goal Outcome Evaluation:   D/c discontinued d/t emesis w/ breakfast. Patient observed to be aspirating. MD notified and patient on full liquid diet. Tolerating liquids, pills crushed in applesauce w/ small bites. GI notified of issue, follow up KUB ordered. Dressings changed.

## 2021-11-03 NOTE — PROGRESS NOTES
Continued Stay Note  [facility]     Patient Name: Marcella Stephens  MRN: [id number]  Today's Date: 11/3/2021    Admit Date: 10/22/2021     Discharge Plan     Row Name 11/03/21 0906       Plan    Plan [facility] PENDING Pre-ert    Plan Comments Per Blanca with [employer], Pre-Cert is still pending. Will inform CCP once obtained.    Row Name 11/03/21 0743       Plan    Plan [facility] PENDING Pre-Cert    Plan Comments Msg sent ot Blanca with [employer] regarding Pre-cert               Discharge Codes    No documentation.               Expected Discharge Date and Time     Expected Discharge Date Expected Discharge Time    Nov 3, 2021             Ying Mcfarland RN

## 2021-11-04 PROCEDURE — 99232 SBSQ HOSP IP/OBS MODERATE 35: CPT | Performed by: INTERNAL MEDICINE

## 2021-11-04 RX ADMIN — SODIUM CHLORIDE 500 ML: 9 INJECTION, SOLUTION INTRAVENOUS at 00:57

## 2021-11-04 RX ADMIN — ASPIRIN 81 MG: 81 TABLET, COATED ORAL at 13:57

## 2021-11-04 RX ADMIN — GALANTAMINE 8 MG: 4 TABLET, FILM COATED ORAL at 09:51

## 2021-11-04 RX ADMIN — POLYETHYLENE GLYCOL 3350 17 G: 17 POWDER, FOR SOLUTION ORAL at 09:50

## 2021-11-04 RX ADMIN — LANSOPRAZOLE 30 MG: KIT at 06:35

## 2021-11-04 RX ADMIN — NYSTATIN 500000 UNITS: 100000 SUSPENSION ORAL at 20:20

## 2021-11-04 RX ADMIN — SODIUM CHLORIDE, PRESERVATIVE FREE 10 ML: 5 INJECTION INTRAVENOUS at 20:22

## 2021-11-04 RX ADMIN — NYSTATIN 500000 UNITS: 100000 SUSPENSION ORAL at 18:13

## 2021-11-04 RX ADMIN — ROSUVASTATIN CALCIUM 20 MG: 20 TABLET, FILM COATED ORAL at 20:15

## 2021-11-04 RX ADMIN — APIXABAN 2.5 MG: 2.5 TABLET, FILM COATED ORAL at 09:50

## 2021-11-04 RX ADMIN — DOCUSATE SODIUM 50MG AND SENNOSIDES 8.6MG 2 TABLET: 8.6; 5 TABLET, FILM COATED ORAL at 09:50

## 2021-11-04 RX ADMIN — TRAZODONE HYDROCHLORIDE 50 MG: 50 TABLET ORAL at 20:15

## 2021-11-04 RX ADMIN — LANSOPRAZOLE 30 MG: KIT at 18:13

## 2021-11-04 RX ADMIN — Medication 3 MG: at 23:46

## 2021-11-04 RX ADMIN — Medication 1000 MCG: at 09:51

## 2021-11-04 RX ADMIN — NYSTATIN 500000 UNITS: 100000 SUSPENSION ORAL at 13:59

## 2021-11-04 RX ADMIN — HYDROXYZINE HYDROCHLORIDE 10 MG: 10 TABLET ORAL at 20:15

## 2021-11-04 RX ADMIN — NYSTATIN 500000 UNITS: 100000 SUSPENSION ORAL at 09:50

## 2021-11-04 RX ADMIN — APIXABAN 2.5 MG: 2.5 TABLET, FILM COATED ORAL at 20:15

## 2021-11-04 NOTE — PLAN OF CARE
Goal Outcome Evaluation:  Plan of Care Reviewed With: patient        Progress: no change  Outcome Summary: Low BP tonight- NS Bolus given x1, denies pain tonight, meds crushed in applesauce, Mepleix on coccyx, chan hips and right heel, barrier cream applied, turning q2 to prevent skin breakdown, slept between care

## 2021-11-04 NOTE — DISCHARGE SUMMARY
[facility]    Date of Discharge:  11/4/2021    PCP: Noemi Chester MD    Discharge Diagnosis:   Active Hospital Problems    Diagnosis  POA   • **Acute UTI [N39.0]  Yes   • Hiatal hernia [K44.9]  Yes   • History of DVT (deep vein thrombosis) [Z86.718]  Not Applicable   • Age-related physical debility [R54]  Yes   • Recent cerebrovascular accident (CVA) [Z86.73]  Yes   • Anemia [D64.9]  Yes   • Vitamin D deficiency [E55.9]  Yes   • Alzheimer's dementia (HCC) [G30.9, F02.80]  Yes      Resolved Hospital Problems    Diagnosis Date Resolved POA   • Postprandial vomiting [R11.10] 11/03/2021 Yes   • Hypokalemia [E87.6] 11/03/2021 Yes   • Elevated troponin [R77.8] 11/03/2021 Yes          Consults     Date and Time Order Name Status Description    10/30/2021  2:31 PM Inpatient Cardiology Consult Completed     10/26/2021 10:29 AM Inpatient Gastroenterology Consult Completed     10/22/2021 11:17 PM [facility] (on-call MD unless specified) Details Completed         Hospital Course  89 y.o. female initially admitted with complaints of generalized weakness.  This was found to be secondary to acute UTI.  Urine culture revealed Proteus Mirabella severe Catherman specimen and patient was treated successfully with 3 days worth of IV Rocephin and no further antibiotics are warranted.  She did have elevated troponins but no complaints of chest pain.  Cardiology saw in consultation and appreciate their assistance.  She is to maintain on low-dose aspirin and her Eliquis dosing has been reduced to 2.5 mg twice daily.  No further cardiac work-up was endorsed from their perspective.  GI also saw in consultation given some postprandial vomiting.  She does have a small hiatal hernia demonstrated on previous admission EGD.  GI did see in consultation and no further work-up was endorsed.  They suggest continuation of bowel regimen of which she will continue on MiraLAX with stool softeners.   "Patient tolerating diet with no further emesis or abdominal pain or complaints of nausea.  GI had subsequently signed off and are okay with disposition from their perspective.  All the above is compounded by patient's past history of dementia but no behavior disorder noted.  She does have a past history of stroke without residual deficit.  Electrolyte imbalances with hypokalemia have been replaced.  Long-term prognosis obviously quite guarded in this 89-year-old patient.  Per review of the chart, daughter seems involved and we will transition the this patient over to subacute rehab pending [facility] coordination.  I discussed the case with RN since daughter was not present at the time of my exam this a.m. to ensure a safe transition.  All questions answered to the patient to the best my ability.        Addendum -discharge was canceled due to further emesis.  I asked that GI come back onto the case to give any further recommendations.  At this point they do not recommend any further work-up and they are okay from discharge from a GI perspective.  They feel this is intermittent episodes of nausea and regurgitation.  I think this patient does have an advanced age and developing aspects of \"burning mouth syndrome\".  I do not think there will be an organic cause for this and I clearly counseled this to the daughter as I reached out over the phone again today at [phone].  I offered any questions and she did not have any additional.  I tried to give insight that I think she needs to focus on end-of-life issues.  Daughter seems to think her mother has copious amounts of time and wants to continue with thoughts of rehabilitation.  I tried to  to focus on quality as opposed to quantity but not sure how welcomed my conversation was.  I also discussed case with CCP and she is organizing discharge facilities for today.  Since we have GI approval, I have no further reason to keep this patient in house.  Long-term prognosis " is poor.  I do not support feeding tube in this patient and GI also felt the same.  CODE STATUS otherwise listed as a DNR        Condition on Discharge: Improved.     Temp:  [97 °F (36.1 °C)-98.2 °F (36.8 °C)] 97.2 °F (36.2 °C)  Heart Rate:  [69-76] 69  Resp:  [14-16] 14  BP: ()/(46-62) 92/54  Body mass index is 20.12 kg/m².    Physical Exam  HENT:      Head: Normocephalic.      Nose: Nose normal.      Mouth/Throat:      Mouth: Mucous membranes are moist.      Pharynx: Oropharynx is clear.      Comments: No yeast or Candida.  Beefy red tongue.  Eyes:      General: No scleral icterus.     Conjunctiva/sclera: Conjunctivae normal.   Cardiovascular:      Rate and Rhythm: Normal rate and regular rhythm.   Pulmonary:      Effort: Pulmonary effort is normal.   Abdominal:      General: Bowel sounds are normal. There is no distension.      Palpations: Abdomen is soft.      Tenderness: There is no abdominal tenderness.   Skin:     General: Skin is warm and dry.      Coloration: Skin is not jaundiced.   Neurological:      Mental Status: She is alert.     [unfilled]    Disposition: Skilled Nursing Facility (DC - External)       Discharge Medications      Changes to Medications      Instructions Start Date   apixaban 2.5 MG tablet tablet  Commonly known as: MIGUEL  What changed:   · medication strength  · how much to take  · how to take this  · when to take this   2.5 mg, Oral, Every 12 Hours Scheduled         Continue These Medications      Instructions Start Date   acetaminophen 325 MG tablet  Commonly known as: TYLENOL   650 mg, Oral, Every 4 Hours PRN      aspirin 81 MG EC tablet   81 mg, Oral, Daily      Cyanocobalamin 500 MCG sublingual tablet   1,000 mcg      docusate sodium 100 MG capsule   100 mg, Oral, 2 Times Daily      famotidine 20 MG tablet  Commonly known as: PEPCID   20 mg, Oral, Daily      galantamine 8 MG tablet  Commonly known as: RAZADYNE   8 mg, Oral, Daily      lidocaine 5 %  Commonly known as:  LIDODERM   1 patch, Transdermal, Every 24 Hours Scheduled, Remove & Discard patch within 12 hours or as directed by MD      magnesium hydroxide 400 MG/5ML suspension  Commonly known as: MILK OF MAGNESIA   Oral, Daily PRN      melatonin 3 MG tablet   3 mg, Oral, Nightly PRN      multivitamin with minerals tablet tablet   Oral, Daily      ondansetron 4 MG tablet  Commonly known as: ZOFRAN   4 mg, Oral, Every 6 Hours PRN      polyethylene glycol 17 g packet  Commonly known as: MIRALAX   17 g, Oral, Daily      rosuvastatin 20 MG tablet  Commonly known as: CRESTOR   20 mg, Oral, Nightly      traZODone 50 MG tablet  Commonly known as: DESYREL   No dose, route, or frequency recorded.      vitamin D 1.25 MG (80359 UT) capsule capsule  Commonly known as: ERGOCALCIFEROL   50,000 Units, Oral, Every 7 Days, X 4 weeks         Stop These Medications    meloxicam 15 MG tablet  Commonly known as: MOBIC     oxyCODONE 5 MG immediate release tablet  Commonly known as: ROXICODONE     pantoprazole 40 MG EC tablet  Commonly known as: PROTONIX             Additional Instructions for the Follow-ups that You Need to Schedule     Discharge Follow-up with PCP   As directed       Currently Documented PCP:    Noemi Chester MD    PCP Phone Number:    [phone]     Follow Up Details: PCP post rehab.  Cardiology and GI as needed            Follow-up Information     Noemi Chester MD .    Specialty: Internal Medicine  Why: PCP post rehab.  Cardiology and GI as needed  Contact information:  [address] [place] [address]  [phone]                           Alireza De Leon MD  11/04/21  11:54 EDT    Discharge time spent greater than 30 minutes.

## 2021-11-04 NOTE — PLAN OF CARE
Goal Outcome Evaluation:  VSS, alert, oriented to self & hospital but not sure which one, B/P remains 90's/50's, good urine output per pure wick, no N/V, mepilex to right heel, coccyx, & B/L hips dry & intact, dressings were changed last shift, bed alarm in use, falls protocol maintained, lonely, likes to talk, sat with pt & talked several times thru-out the day  Plan of Care Reviewed With: patient

## 2021-11-04 NOTE — PROGRESS NOTES
[facility]  Inpatient Progress Note    Reason for Follow Up: Nausea, vomiting, fecal impaction    Subjective     Interval History:   Called back by hospitalist, patient was to be discharged yesterday but apparently vomited her breakfast.    KUB yesterday afternoon with nonobstructive bowel pattern.    She has no complaints this morning.  Nurse reports a stool at 3 AM, incontinent.    No further vomiting since breakfast yesterday.  She just ate breakfast this morning without issue.    EGD with Dr. Salas on 9/27/2021 showed small hiatal hernia, normal esophagus, normal duodenum, no evidence of bleeding.    Current Facility-Administered Medications:   •  acetaminophen (TYLENOL) tablet 650 mg, 650 mg, Oral, Q4H PRN, 650 mg at 10/30/21 1026 **OR** acetaminophen (TYLENOL) 160 MG/5ML solution 650 mg, 650 mg, Oral, Q4H PRN, 650 mg at 11/01/21 1720 **OR** acetaminophen (TYLENOL) suppository 650 mg, 650 mg, Rectal, Q4H PRN, Laura Wilde APRN  •  apixaban (ELIQUIS) tablet 2.5 mg, 2.5 mg, Oral, Q12H, Laura Muhammad MD, 2.5 mg at 11/03/21 2102  •  aspirin EC tablet 81 mg, 81 mg, Oral, Daily, Steph Cabrera DO, 81 mg at 11/03/21 0824  •  bisacodyl (DULCOLAX) suppository 10 mg, 10 mg, Rectal, Daily PRN, Steph Cabrera DO, 10 mg at 10/30/21 1453  •  calcium carbonate (TUMS) chewable tablet 500 mg (200 mg elemental), 2 tablet, Oral, BID PRN, Laura Wilde APRN  •  galantamine (RAZADYNE) tablet 8 mg, 8 mg, Oral, Daily, Steph Cabrera DO, 8 mg at 11/03/21 0824  •  hydrOXYzine (ATARAX) tablet 10 mg, 10 mg, Oral, TID PRN, Elizabeth Rasheed APRN, 10 mg at 11/02/21 2009  •  lansoprazole (FIRST) oral suspension 30 mg, 30 mg, Oral, BID AC, Primo Olmstead MD, 30 mg at 11/04/21 0635  •  Magnesium Sulfate 2 gram Bolus, followed by 8 gram infusion (total Mg dose 10 grams)- Mg less than or equal to 1mg/dL, 2 g, Intravenous, PRN **OR** Magnesium Sulfate 2 gram / 50mL Infusion  (GIVE X 3 BAGS TO EQUAL 6GM TOTAL DOSE) - Mg 1.1 - 1.5 mg/dl, 2 g, Intravenous, PRN **OR** Magnesium Sulfate 4 gram infusion- Mg 1.6-1.9 mg/dL, 4 g, Intravenous, PRN, Steph Cabrera DO, Last Rate: 25 mL/hr at 10/26/21 2149, Restarted at 10/26/21 2149  •  melatonin tablet 3 mg, 3 mg, Oral, Nightly PRN, Steph Cabrera DO, 3 mg at 11/03/21 2126  •  nystatin (MYCOSTATIN) 100,000 unit/mL suspension 500,000 Units, 5 mL, Swish & Spit, 4x Daily, Primo Olmstead MD, 500,000 Units at 11/03/21 2102  •  ondansetron (ZOFRAN) tablet 4 mg, 4 mg, Oral, Q6H PRN, 4 mg at 11/03/21 0825 **OR** ondansetron (ZOFRAN) injection 4 mg, 4 mg, Intravenous, Q6H PRN, Laura Wilde APRN  •  polyethylene glycol (MIRALAX) packet 17 g, 17 g, Oral, Daily, Elizabeth Rasheed, APRN, 17 g at 11/02/21 1000  •  rosuvastatin (CRESTOR) tablet 20 mg, 20 mg, Oral, Nightly, Steph Cabrera DO, 20 mg at 11/03/21 2102  •  sennosides-docusate (PERICOLACE) 8.6-50 MG per tablet 2 tablet, 2 tablet, Oral, Daily, Steph Cabrera DO, 2 tablet at 11/02/21 1000  •  sodium chloride 0.9 % flush 10 mL, 10 mL, Intravenous, Q12H, Laura Wilde APRN, 10 mL at 11/01/21 0902  •  traZODone (DESYREL) tablet 50 mg, 50 mg, Oral, Nightly, Steph Cabrera DO, 50 mg at 11/03/21 2101  •  vitamin B-12 (CYANOCOBALAMIN) tablet 1,000 mcg, 1,000 mcg, Oral, Daily, Steph Cabrera DO, 1,000 mcg at 11/03/21 0824  Review of Systems:     The following systems were reviewed and negative: Constitution, pulmonary, cardiac, gastrointestinal, genitourinary      Objective     Vital Signs  Temp:  [97 °F (36.1 °C)-98.2 °F (36.8 °C)] 97.2 °F (36.2 °C)  Heart Rate:  [69-76] 69  Resp:  [14-16] 14  BP: ()/(46-62) 92/54  Body mass index is 20.12 kg/m².    Intake/Output Summary (Last 24 hours) at 11/4/2021 0945  Last data filed at 11/3/2021 1708  Gross per 24 hour   Intake 660 ml   Output --   Net 660 ml     No intake/output data recorded.     Physical  Exam:   General: patient awake, alert and cooperative, pleasantly confused   Eyes: Normal lids and lashes, no scleral icterus   Neck: supple, normal ROM   Skin: warm and dry, not jaundiced   Cardiovascular: regular rhythm and rate, no murmurs auscultated   Pulm: clear to auscultation bilaterally, regular and unlabored   Abdomen: soft, nontender, nondistended; normal bowel sounds   Rectal: deferred   Extremities: no rash or edema   Psychiatric: Normal mood and behavior; poor historian, confused     Results Review:     I reviewed the patient's new clinical results.    Results from last 7 days   Lab Units 11/03/21  0715 10/31/21  0634 10/30/21  0633   WBC 10*3/mm3 6.63 6.16 8.29   HEMOGLOBIN g/dL 8.7* 8.8* 9.8*   HEMATOCRIT % 28.3* 27.4* 31.6*   PLATELETS 10*3/mm3 249 315 374     Results from last 7 days   Lab Units 11/03/21  0715 10/31/21  0634 10/30/21  0633   SODIUM mmol/L 139 142 140   POTASSIUM mmol/L 3.4* 3.8 3.6   CHLORIDE mmol/L 104 108* 105   CO2 mmol/L 30.0* 29.1* 27.6   BUN mg/dL 12 2* 3*   CREATININE mg/dL 0.34* 0.29* 0.30*   CALCIUM mg/dL 7.7* 7.7* 8.0*   GLUCOSE mg/dL 102* 83 104*         Lab Results   Lab Value Date/Time    LIPASE 14 09/26/2021 0341       Radiology:  XR Abdomen KUB   Final Result   1. Patient currently has a nonobstructive bowel gas pattern. There has   been resolution of large amount stool distending the rectum compatible   with resolving fecal impaction.   2. Previous lumbar spine surgery with bilateral laminectomies at L4 and   L5, posterior bony fusion mass bridging posterior element L4 S1. There   is a ned and dynamic screw fixation proximal right femur for a right   intertrochanteric fracture that occurred back on 08/27/2021. There   appears be detachment of the right greater trochanter from prior   fracture that occurred back on 08/27/2021.        This report was finalized on 11/4/2021 7:22 AM by Dr. Tono Mcconnell M.D.          CT Abdomen Pelvis With & Without Contrast   Final  Result      XR Abdomen KUB   Final Result   Negative.       This report was finalized on 10/26/2021 9:17 PM by Dr. Kirt Jimenes M.D.          XR Knee 1 or 2 View Left   Final Result         Electronically signed by Rodri Obando MD on 10-23-21 at 0003      XR Hip With or Without Pelvis 2 - 3 View Right   Final Result         Electronically signed by Rodri Obando MD on 10-22-21 at 2302      XR Chest 1 View   Final Result         Electronically signed by Rodri Obando MD on 10-22-21 at 2300          Assessment/Plan     Patient Active Problem List   Diagnosis   • Closed fracture of right hip (HCC)   • Fall as cause of accidental injury at home as place of occurrence   • Alzheimer's dementia (HCC)   • Anemia   • Vitamin D deficiency   • Weakness of right upper extremity   • Acute retention of urine   • Hydronephrosis   • Acute deep vein thrombosis (DVT) of brachial vein of right upper extremity (HCC)   • Recent cerebrovascular accident (CVA)   • DVT of lower extremity, bilateral (HCC)   • Acute UTI   • History of DVT (deep vein thrombosis)   • Age-related physical debility   • Hiatal hernia       Impression  1.  Nausea, vomiting: Mostly postprandial.  Recent EGD.  No further episodes since yesterday morning.  Questionable component of regurgitation/esophageal motility issue    2.  Fecal impaction: Resolved    3.  Chronic constipation: Stable on bowel regimen    4.  Age-related debility, history of stroke, dementia    5.  UTI    Plan  No further work-up from GI standpoint--I do not think she is appropriate for a feeding tube and for the most part she appears to be tolerating diet save for some intermittent episodes of nausea/regurgitation  Continue diet as tolerated  Continue bowel regimen  Okay for discharge from GI standpoint    I discussed the patients findings and my recommendations with patient and nursing staff.    All necessary PPE, including face mask and eye protection, were worn during this  encounter.  Hand sanitization was performed both before and after the patient interaction.    Over 25 minutes was spent reviewing the patient's chart and records, in discussion with the patient, in examination of the patient, and in discussion with members of the patient's medical team.    Kellie Lombardo MD

## 2021-11-04 NOTE — PROGRESS NOTES
Continued Stay Note  [facility]     Patient Name: Marcella Stephens  MRN: [id number]  Today's Date: 11/4/2021    Admit Date: 10/22/2021     Discharge Plan     Row Name 11/04/21 1639       Plan    Plan Comments Second attempt to call daughter. No answer and unable to leave VM.    Row Name 11/04/21 1516       Plan    Plan [facility] (Pre-Cert obatined)    Plan Comments Per Blanca with [facility], pre-cert obtained. Facility unable to accept today. Bed available tomorrow and can accept. Transportation scheduled for 9am on 11/5. Confirmation # [id number]. Updated/ Informed Blanca with [facility] and Judi with [facility] of time of transportation. Attempted to call daughter Bonita at [phone] but no answer and was unable to leave VM. Will attempt again.               Discharge Codes    No documentation.               Expected Discharge Date and Time     Expected Discharge Date Expected Discharge Time    Nov 5, 2021             Ying Mcfarland RN

## 2021-11-04 NOTE — PROGRESS NOTES
Continued Stay Note  [facility]     Patient Name: Marcella Stephens  MRN: [id number]  Today's Date: 11/4/2021    Admit Date: 10/22/2021     Discharge Plan     Row Name 11/04/21 1028       Plan    Plan [facility] PENDING Pre-Cert    Plan Comments Msg sent to Blanca with [facility] regarding Pre-cert.               Discharge Codes    No documentation.               Expected Discharge Date and Time     Expected Discharge Date Expected Discharge Time    Nov 4, 2021             Ying Mcfarland RN

## 2021-11-04 NOTE — PROGRESS NOTES
Continued Stay Note  [facility]     Patient Name: Marcella Stephens  MRN: [id number]  Today's Date: 11/4/2021    Admit Date: 10/22/2021     Discharge Plan     Row Name 11/04/21 1516       Plan    Plan [facility] (Pre-Cert obatined)    Plan Comments Per Blanca with [facility], pre-cert obtained. Facility unable to accept today. Bed available tomorrow and can accept. Transportation scheduled for 9am on 11/5. Confirmation # [id number]. Updated/ Informed Blanca with [facility] and Judi with [facility] of time of transportation. Attempted to call daughter Bonita at [phone] but no answer and was unable to leave . Will attempt again.               Discharge Codes    No documentation.               Expected Discharge Date and Time     Expected Discharge Date Expected Discharge Time    Nov 5, 2021             Ying Mcfarland RN

## 2021-11-05 VITALS
WEIGHT: 110 LBS | OXYGEN SATURATION: 96 % | HEIGHT: 62 IN | SYSTOLIC BLOOD PRESSURE: 103 MMHG | TEMPERATURE: 97.3 F | RESPIRATION RATE: 16 BRPM | DIASTOLIC BLOOD PRESSURE: 62 MMHG | HEART RATE: 70 BPM | BODY MASS INDEX: 20.24 KG/M2

## 2021-11-05 RX ADMIN — LANSOPRAZOLE 30 MG: KIT at 06:47

## 2021-11-05 RX ADMIN — DOCUSATE SODIUM 50MG AND SENNOSIDES 8.6MG 2 TABLET: 8.6; 5 TABLET, FILM COATED ORAL at 08:33

## 2021-11-05 RX ADMIN — GALANTAMINE 8 MG: 4 TABLET, FILM COATED ORAL at 08:33

## 2021-11-05 RX ADMIN — POLYETHYLENE GLYCOL 3350 17 G: 17 POWDER, FOR SOLUTION ORAL at 08:33

## 2021-11-05 RX ADMIN — ASPIRIN 81 MG: 81 TABLET, COATED ORAL at 08:45

## 2021-11-05 RX ADMIN — Medication 1000 MCG: at 08:32

## 2021-11-05 RX ADMIN — NYSTATIN 500000 UNITS: 100000 SUSPENSION ORAL at 08:32

## 2021-11-05 RX ADMIN — APIXABAN 2.5 MG: 2.5 TABLET, FILM COATED ORAL at 08:32

## 2021-11-05 NOTE — PROGRESS NOTES
Case Management Discharge Note      Final Note: Discharged to [facility] skilled rehab. Ying Mcfarland, RN    Provided Post Acute Provider List?: N/A  N/A Provider List Comment: Son stated [facility]    Selected Continued Care - Discharged on 11/5/2021 Admission date: 10/22/2021 - Discharge disposition: Skilled Nursing Facility (DC - External)    Destination Coordination complete.    Service Provider Selected Services Address Phone Fax Patient Preferred    [facility]  Skilled Nursing [address] [address] [phone] --         Transportation Services  W/C Van: [facility]    Final Discharge Disposition Code: 03 - skilled nursing facility (SNF)

## 2021-11-05 NOTE — PLAN OF CARE
Goal Outcome Evaluation:  Plan of Care Reviewed With: patient        Progress: no change  Outcome Summary: VSS, afebrile, very anxious tonight- pulled out IV and removed brief, denies pain/nausea, meds crushed in applesauce, melatonin given for sleep, mepleix on coccyx, chan hips and right heel CDI, barrier cream applied, slept between care, possible D/C to [facility] this morning

## 2021-11-05 NOTE — PROGRESS NOTES
Continued Stay Note  [facility]     Patient Name: Marcella Stephens  MRN: [id number]  Today's Date: 11/5/2021    Admit Date: 10/22/2021     Discharge Plan     Row Name 11/05/21 0826       Plan    Plan [facility] Skilled rehab    Plan Comments DC summary faxed. Packet given to RN. Spoke to daughter, Bonita Reddy at [phone]. Agreeable to DC plan. Informed of time of transportation. Denies any additional needs/equipment.               Discharge Codes    No documentation.               Expected Discharge Date and Time     Expected Discharge Date Expected Discharge Time    Nov 5, 2021             Ying Mcafrland, RN

## 2021-11-05 NOTE — DISCHARGE SUMMARY
See previous discharge summary.  Discharge held secondary to logistics and transportation.  Patient was formally discharged on 11/4/2021.  Kept overnight for transportation this a.m.  Called RN and no new issues have arisen overnight.  Vital signs stable/afebrile.  Discharge as previously ordered

## 2021-11-06 NOTE — PLAN OF CARE
Goal Outcome Evaluation:  VSS, wounds x 4 healing, dressings changed per orders, picturest aken but camera stopped working during upload images are in left hip documentation, pure wick in use, falls maintaine, bed alarm in use, pt oriented to self, forgetful, scared at times, re-assured, pleasant & cooperative, attempted to call report to [facility] at 0845, no answer, only voice mail, left name phone number & message, no return call, call from Director of [facility], RN busy with another pt, US took name & number for RN to call, RN returned  who answered said director Angelica Ratliff was not able to come to phone took RN name & number to return call, no return call, manager aware  Plan of Care Reviewed With: patient

## 2021-11-24 ENCOUNTER — TELEPHONE (OUTPATIENT)
Dept: NEUROLOGY | Facility: CLINIC | Age: 86
End: 2021-11-24

## 2021-12-23 ENCOUNTER — APPOINTMENT (OUTPATIENT)
Dept: CT IMAGING | Facility: HOSPITAL | Age: 86
End: 2021-12-23

## 2021-12-23 ENCOUNTER — APPOINTMENT (OUTPATIENT)
Dept: GENERAL RADIOLOGY | Facility: HOSPITAL | Age: 86
End: 2021-12-23

## 2021-12-23 ENCOUNTER — HOSPITAL ENCOUNTER (EMERGENCY)
Facility: HOSPITAL | Age: 86
Discharge: NURSING, REHAB OR LONG-TERM CARE | End: 2021-12-23
Attending: EMERGENCY MEDICINE | Admitting: EMERGENCY MEDICINE

## 2021-12-23 VITALS
BODY MASS INDEX: 20.12 KG/M2 | DIASTOLIC BLOOD PRESSURE: 83 MMHG | HEIGHT: 62 IN | OXYGEN SATURATION: 100 % | HEART RATE: 81 BPM | RESPIRATION RATE: 18 BRPM | TEMPERATURE: 97.8 F | SYSTOLIC BLOOD PRESSURE: 111 MMHG

## 2021-12-23 DIAGNOSIS — F03.90 DEMENTIA WITHOUT BEHAVIORAL DISTURBANCE, UNSPECIFIED DEMENTIA TYPE: ICD-10-CM

## 2021-12-23 DIAGNOSIS — R82.90 ABNORMAL URINALYSIS: ICD-10-CM

## 2021-12-23 DIAGNOSIS — D64.9 CHRONIC ANEMIA: ICD-10-CM

## 2021-12-23 DIAGNOSIS — R53.1 WEAKNESS: Primary | ICD-10-CM

## 2021-12-23 LAB
ALBUMIN SERPL-MCNC: 3.1 G/DL (ref 3.5–5.2)
ALBUMIN/GLOB SERPL: 1 G/DL
ALP SERPL-CCNC: 77 U/L (ref 39–117)
ALT SERPL W P-5'-P-CCNC: 9 U/L (ref 1–33)
ANION GAP SERPL CALCULATED.3IONS-SCNC: 7.4 MMOL/L (ref 5–15)
AST SERPL-CCNC: 18 U/L (ref 1–32)
BACTERIA UR QL AUTO: ABNORMAL /HPF
BASOPHILS # BLD AUTO: 0.03 10*3/MM3 (ref 0–0.2)
BASOPHILS NFR BLD AUTO: 0.4 % (ref 0–1.5)
BILIRUB SERPL-MCNC: 0.3 MG/DL (ref 0–1.2)
BILIRUB UR QL STRIP: NEGATIVE
BUN SERPL-MCNC: 26 MG/DL (ref 8–23)
BUN/CREAT SERPL: 65 (ref 7–25)
CALCIUM SPEC-SCNC: 9.2 MG/DL (ref 8.2–9.6)
CHLORIDE SERPL-SCNC: 106 MMOL/L (ref 98–107)
CK SERPL-CCNC: 62 U/L (ref 20–180)
CLARITY UR: ABNORMAL
CO2 SERPL-SCNC: 28.6 MMOL/L (ref 22–29)
COD CRY URNS QL: ABNORMAL /HPF
COLOR UR: YELLOW
CREAT SERPL-MCNC: 0.4 MG/DL (ref 0.57–1)
DEPRECATED RDW RBC AUTO: 52 FL (ref 37–54)
EOSINOPHIL # BLD AUTO: 0.12 10*3/MM3 (ref 0–0.4)
EOSINOPHIL NFR BLD AUTO: 1.6 % (ref 0.3–6.2)
ERYTHROCYTE [DISTWIDTH] IN BLOOD BY AUTOMATED COUNT: 15 % (ref 12.3–15.4)
GFR SERPL CREATININE-BSD FRML MDRD: 148 ML/MIN/1.73
GLOBULIN UR ELPH-MCNC: 3.1 GM/DL
GLUCOSE SERPL-MCNC: 103 MG/DL (ref 65–99)
GLUCOSE UR STRIP-MCNC: NEGATIVE MG/DL
HCT VFR BLD AUTO: 30.7 % (ref 34–46.6)
HGB BLD-MCNC: 9.8 G/DL (ref 12–15.9)
HGB UR QL STRIP.AUTO: ABNORMAL
HYALINE CASTS UR QL AUTO: ABNORMAL /LPF
IMM GRANULOCYTES # BLD AUTO: 0.03 10*3/MM3 (ref 0–0.05)
IMM GRANULOCYTES NFR BLD AUTO: 0.4 % (ref 0–0.5)
KETONES UR QL STRIP: ABNORMAL
LEUKOCYTE ESTERASE UR QL STRIP.AUTO: ABNORMAL
LYMPHOCYTES # BLD AUTO: 1.28 10*3/MM3 (ref 0.7–3.1)
LYMPHOCYTES NFR BLD AUTO: 17.5 % (ref 19.6–45.3)
MCH RBC QN AUTO: 30.5 PG (ref 26.6–33)
MCHC RBC AUTO-ENTMCNC: 31.9 G/DL (ref 31.5–35.7)
MCV RBC AUTO: 95.6 FL (ref 79–97)
MONOCYTES # BLD AUTO: 0.72 10*3/MM3 (ref 0.1–0.9)
MONOCYTES NFR BLD AUTO: 9.8 % (ref 5–12)
NEUTROPHILS NFR BLD AUTO: 5.15 10*3/MM3 (ref 1.7–7)
NEUTROPHILS NFR BLD AUTO: 70.3 % (ref 42.7–76)
NITRITE UR QL STRIP: NEGATIVE
NRBC BLD AUTO-RTO: 0 /100 WBC (ref 0–0.2)
PH UR STRIP.AUTO: <=5 [PH] (ref 5–8)
PLATELET # BLD AUTO: 195 10*3/MM3 (ref 140–450)
PMV BLD AUTO: 9.8 FL (ref 6–12)
POTASSIUM SERPL-SCNC: 4.5 MMOL/L (ref 3.5–5.2)
PROT SERPL-MCNC: 6.2 G/DL (ref 6–8.5)
PROT UR QL STRIP: ABNORMAL
QT INTERVAL: 431 MS
RBC # BLD AUTO: 3.21 10*6/MM3 (ref 3.77–5.28)
RBC # UR STRIP: ABNORMAL /HPF
REF LAB TEST METHOD: ABNORMAL
SODIUM SERPL-SCNC: 142 MMOL/L (ref 136–145)
SP GR UR STRIP: 1.03 (ref 1–1.03)
SQUAMOUS #/AREA URNS HPF: ABNORMAL /HPF
UROBILINOGEN UR QL STRIP: ABNORMAL
WBC # UR STRIP: ABNORMAL /HPF
WBC NRBC COR # BLD: 7.33 10*3/MM3 (ref 3.4–10.8)

## 2021-12-23 PROCEDURE — 71045 X-RAY EXAM CHEST 1 VIEW: CPT

## 2021-12-23 PROCEDURE — 93010 ELECTROCARDIOGRAM REPORT: CPT | Performed by: INTERNAL MEDICINE

## 2021-12-23 PROCEDURE — 80053 COMPREHEN METABOLIC PANEL: CPT | Performed by: NURSE PRACTITIONER

## 2021-12-23 PROCEDURE — 85025 COMPLETE CBC W/AUTO DIFF WBC: CPT | Performed by: NURSE PRACTITIONER

## 2021-12-23 PROCEDURE — 81001 URINALYSIS AUTO W/SCOPE: CPT | Performed by: NURSE PRACTITIONER

## 2021-12-23 PROCEDURE — 99284 EMERGENCY DEPT VISIT MOD MDM: CPT

## 2021-12-23 PROCEDURE — 93005 ELECTROCARDIOGRAM TRACING: CPT | Performed by: NURSE PRACTITIONER

## 2021-12-23 PROCEDURE — 70450 CT HEAD/BRAIN W/O DYE: CPT

## 2021-12-23 PROCEDURE — 82550 ASSAY OF CK (CPK): CPT | Performed by: NURSE PRACTITIONER

## 2021-12-23 RX ORDER — CEPHALEXIN 500 MG/1
500 CAPSULE ORAL ONCE
Status: COMPLETED | OUTPATIENT
Start: 2021-12-23 | End: 2021-12-23

## 2021-12-23 RX ORDER — TRAZODONE HYDROCHLORIDE 50 MG/1
50 TABLET ORAL DAILY
COMMUNITY

## 2021-12-23 RX ORDER — SODIUM CHLORIDE 0.9 % (FLUSH) 0.9 %
10 SYRINGE (ML) INJECTION AS NEEDED
Status: DISCONTINUED | OUTPATIENT
Start: 2021-12-23 | End: 2021-12-23 | Stop reason: HOSPADM

## 2021-12-23 RX ORDER — CEPHALEXIN 500 MG/1
500 CAPSULE ORAL 4 TIMES DAILY
Qty: 40 CAPSULE | Refills: 0 | Status: SHIPPED | OUTPATIENT
Start: 2021-12-23 | End: 2021-12-28

## 2021-12-23 RX ADMIN — CEPHALEXIN 500 MG: 500 CAPSULE ORAL at 09:46

## 2021-12-23 NOTE — ED PROVIDER NOTES
EMERGENCY DEPARTMENT ENCOUNTER    Room Number:  12/12  Date of encounter:  12/23/2021  PCP: Noemi Chester MD  Historian: patient, nursing home staff   Full history not obtainable due to: dementia     HPI:  Chief Complaint: AMS     Context: Marcella Stephens is a 89 y.o. female who presents to the ED c/o ams onset this am. Staff at facility found pt laying in the floor and she refused to get up so they wanted her to be evaluated. The pt has no complaints. She is alert to person, place and time but has some difficulty with recent events. She tells me she is here in the ED for evaluation of her chronic left foot pain.         PAST MEDICAL HISTORY    Active Ambulatory Problems     Diagnosis Date Noted   • Closed fracture of right hip ([facility]) 08/27/2021   • Fall as cause of accidental injury at home as place of occurrence 08/29/2021   • Alzheimer's dementia ([facility]) 08/29/2021   • Anemia 08/30/2021   • Vitamin D deficiency 08/30/2021   • Weakness of right upper extremity 08/31/2021   • Acute retention of urine 08/31/2021   • Hydronephrosis 08/31/2021   • Acute deep vein thrombosis (DVT) of brachial vein of right upper extremity ([facility]) 09/01/2021   • Recent cerebrovascular accident (CVA) 09/03/2021   • DVT of lower extremity, bilateral ([facility]) 09/04/2021   • Acute UTI 10/22/2021   • History of DVT (deep vein thrombosis) 10/23/2021   • Age-related physical debility 10/23/2021   • Hiatal hernia 10/27/2021     Resolved Ambulatory Problems     Diagnosis Date Noted   • Traumatic rhabdomyolysis ([facility]) 08/28/2021   • Acute hyperglycemia 08/29/2021   • Acute kidney failure ([facility]) 08/30/2021   • Elevated troponin 10/23/2021   • Postprandial vomiting 10/27/2021   • Hypokalemia 10/27/2021     Past Medical History:   Diagnosis Date   • Acute embolism and thombos unsp deep veins of low extrm, bi ([facility])    • Dysphagia    • Prediabetes    • Stroke ([facility])    • TIA (transient ischemic attack)    • Urinary tract infection    • Vascular dementia  without behavioral disturbance (HCC)          PAST SURGICAL HISTORY  Past Surgical History:   Procedure Laterality Date   • BACK SURGERY     • FEMUR IM NAILING/RODDING Right 8/28/2021    Procedure: FEMUR INTRAMEDULLARY NAILING/RODDING;  Surgeon: Louis Scruggs MD;  Location: [facility];  Service: Orthopedics;  Laterality: Right;   • KNEE SURGERY           FAMILY HISTORY  History reviewed. No pertinent family history.      SOCIAL HISTORY  Social History     Socioeconomic History   • Marital status:    Tobacco Use   • Smoking status: Never Smoker   • Smokeless tobacco: Never Used   Vaping Use   • Vaping Use: Never used   Substance and Sexual Activity   • Alcohol use: No   • Drug use: No   • Sexual activity: Defer         ALLERGIES  Donepezil        REVIEW OF SYSTEMS  Review of Systems   All systems reviewed and marked as negative except as listed in HPI       PHYSICAL EXAM    I have reviewed the triage vital signs and nursing notes.    ED Triage Vitals   Temp Heart Rate Resp BP SpO2   12/23/21 0528 12/23/21 0528 12/23/21 0528 12/23/21 0528 12/23/21 0606   97.8 °F (36.6 °C) 81 18 111/83 100 %      Temp src Heart Rate Source Patient Position BP Location FiO2 (%)   12/23/21 0528 12/23/21 0528 -- -- --   Tympanic Monitor          GENERAL: alert well developed, well nourished in no distress  HENT: NCAT, neck supple, trachea midline  EYES: no scleral icterus, PERRL, normal conjunctivae  CV: regular rhythm, regular rate, no murmur  RESPIRATORY: unlabored effort, CTAB  ABDOMEN: soft, nontender, nondistended, bowel sounds present  MUSCULOSKELETAL: no gross deformity. Feet are cool to palpation, distal dorsal surfaces appears purple to blue in color. Pedal pulses are weakly palpable but present. No focal tenderness of extremities or spine.   NEURO: alert,  sensory and motor function of extremities grossly intact, speech clear, mental status normal/baseline  SKIN: warm, dry, no rash  PSYCH:  Appropriate mood and  affect    Vital signs and nursing notes reviewed.          LAB RESULTS  Recent Results (from the past 24 hour(s))   ECG 12 Lead    Collection Time: 12/23/21  6:44 AM   Result Value Ref Range    QT Interval 431 ms   CBC Auto Differential    Collection Time: 12/23/21  7:28 AM    Specimen: Blood   Result Value Ref Range    WBC 7.33 3.40 - 10.80 10*3/mm3    RBC 3.21 (L) 3.77 - 5.28 10*6/mm3    Hemoglobin 9.8 (L) 12.0 - 15.9 g/dL    Hematocrit 30.7 (L) 34.0 - 46.6 %    MCV 95.6 79.0 - 97.0 fL    MCH 30.5 26.6 - 33.0 pg    MCHC 31.9 31.5 - 35.7 g/dL    RDW 15.0 12.3 - 15.4 %    RDW-SD 52.0 37.0 - 54.0 fl    MPV 9.8 6.0 - 12.0 fL    Platelets 195 140 - 450 10*3/mm3    Neutrophil % 70.3 42.7 - 76.0 %    Lymphocyte % 17.5 (L) 19.6 - 45.3 %    Monocyte % 9.8 5.0 - 12.0 %    Eosinophil % 1.6 0.3 - 6.2 %    Basophil % 0.4 0.0 - 1.5 %    Immature Grans % 0.4 0.0 - 0.5 %    Neutrophils, Absolute 5.15 1.70 - 7.00 10*3/mm3    Lymphocytes, Absolute 1.28 0.70 - 3.10 10*3/mm3    Monocytes, Absolute 0.72 0.10 - 0.90 10*3/mm3    Eosinophils, Absolute 0.12 0.00 - 0.40 10*3/mm3    Basophils, Absolute 0.03 0.00 - 0.20 10*3/mm3    Immature Grans, Absolute 0.03 0.00 - 0.05 10*3/mm3    nRBC 0.0 0.0 - 0.2 /100 WBC   Comprehensive Metabolic Panel    Collection Time: 12/23/21  8:12 AM    Specimen: Blood   Result Value Ref Range    Glucose 103 (H) 65 - 99 mg/dL    BUN 26 (H) 8 - 23 mg/dL    Creatinine 0.40 (L) 0.57 - 1.00 mg/dL    Sodium 142 136 - 145 mmol/L    Potassium 4.5 3.5 - 5.2 mmol/L    Chloride 106 98 - 107 mmol/L    CO2 28.6 22.0 - 29.0 mmol/L    Calcium 9.2 8.2 - 9.6 mg/dL    Total Protein 6.2 6.0 - 8.5 g/dL    Albumin 3.10 (L) 3.50 - 5.20 g/dL    ALT (SGPT) 9 1 - 33 U/L    AST (SGOT) 18 1 - 32 U/L    Alkaline Phosphatase 77 39 - 117 U/L    Total Bilirubin 0.3 0.0 - 1.2 mg/dL    eGFR Non African Amer 148 >60 mL/min/1.73    Globulin 3.1 gm/dL    A/G Ratio 1.0 g/dL    BUN/Creatinine Ratio 65.0 (H) 7.0 - 25.0    Anion Gap 7.4 5.0 - 15.0  mmol/L   CK    Collection Time: 12/23/21  8:12 AM    Specimen: Blood   Result Value Ref Range    Creatine Kinase 62 20 - 180 U/L   Urinalysis With Microscopic If Indicated (No Culture) - Urine, Clean Catch    Collection Time: 12/23/21  8:13 AM    Specimen: Urine, Clean Catch   Result Value Ref Range    Color, UA Yellow Yellow, Straw    Appearance, UA Cloudy (A) Clear    pH, UA <=5.0 5.0 - 8.0    Specific Gravity, UA 1.026 1.005 - 1.030    Glucose, UA Negative Negative    Ketones, UA Trace (A) Negative    Bilirubin, UA Negative Negative    Blood, UA Moderate (2+) (A) Negative    Protein, UA Trace (A) Negative    Leuk Esterase, UA Moderate (2+) (A) Negative    Nitrite, UA Negative Negative    Urobilinogen, UA 0.2 E.U./dL 0.2 - 1.0 E.U./dL   Urinalysis, Microscopic Only - Urine, Clean Catch    Collection Time: 12/23/21  8:13 AM    Specimen: Urine, Clean Catch   Result Value Ref Range    RBC, UA None Seen None Seen, 0-2 /HPF    WBC, UA 6-12 (A) None Seen, 0-2 /HPF    Bacteria, UA 2+ (A) None Seen /HPF    Squamous Epithelial Cells, UA None Seen None Seen, 0-2 /HPF    Hyaline Casts, UA None Seen None Seen /LPF    Calcium Oxalate Crystals, UA Small/1+ None Seen /HPF    Methodology Manual Light Microscopy        Ordered the above labs and independently reviewed the results.        RADIOLOGY  CT Head Without Contrast    Result Date: 12/23/2021  EMERGENCY NONCONTRAST HEAD CT 12/23/2021  CLINICAL HISTORY: Altered mental status, confusion.  TECHNIQUE: Spiral CT images were obtained from the base of the skull to the vertex without intravenous contrast. Images were reformatted and submitted in 3 mm thick axial CT section with brain algorithm and 2 mm thick sagittal and coronal reconstructions were performed and submitted in brain algorithm.  COMPARISON: This is correlated to prior MRI of the head from [facility] 09/02/2021. This is also correlated to prior noncontrast head CT 05/23/2020.  FINDINGS: There are  extensive patchy and confluent areas of low-density throughout the periventricular and subcortical white matter of the cerebral hemispheres, consistent with moderate-to-severe small vessel disease. There is diffuse cerebral atrophy. Lateral and third ventricles are mildly prominent in size, felt to be on the basis of central volume loss or atrophy. I see no focal mass effect and no midline shift and no extra-axial fluid collections are identified, and there is no evidence of acute intracranial hemorrhage. The calvarium and skull base are normal in appearance. There is complete opacification of the right maxillary sinus with circumferential mucosal thickening, central chronic inspissated secretions, some bony thickening in the walls of the right maxillary sinus from chronic bilateral maxillary sinus disease, unchanged since 05/23/2020. The remainder of the paranasal sinuses and mastoid air cells and middle ear cavities are clear.      1. No significant interval change when compared to prior head CT 05/23/2020 with no acute intracranial abnormality seen. 2. There is moderate-to-severe small vessel disease throughout the white matter of the cerebral hemispheres and there is diffuse cerebral atrophy and lateral and third ventricles are prominent in size, felt to be on the basis of central volume loss or atrophy. 3. There is chronic complete opacification of the right maxillary sinus with circumferential mucosal thickening. There is central chronic inspissated secretions with bony thickening in the walls of the right maxillary sinus from chronic right maxillary sinus disease. Remainder of the head CT is within normal limits. Etiology of the acute altered mental status and confusion is not established on this exam.  Radiation dose reduction techniques were utilized, including automated exposure control and exposure modulation based on body size.  This report was finalized on 12/23/2021 8:32 AM by Dr. Tono Mcconnell M.D.       XR Chest 1 View    Result Date: 12/23/2021  XR CHEST 1 VW-  Clinical: Weakness  COMPARISON 10/22/2021  FINDINGS: There is mild chronic parenchymal change similar to the previous examination. No effusion, edema or acute airspace disease has developed. There is ectasia of the thoracic aorta with atherosclerotic calcification. Mediastinum and td otherwise satisfactory in appearance. Mild cardiac enlargement.  CONCLUSION: No acute cardiovascular or pulmonary process has developed.  This report was finalized on 12/23/2021 8:01 AM by Dr. Chet Spears M.D.        I ordered the above noted radiological studies. Independently reviewed by me and discussed with radiologist.  See dictation above for official radiology interpretation.      PROCEDURES    Procedures        MEDICATIONS GIVEN IN ER    Medications   cephalexin (KEFLEX) capsule 500 mg (500 mg Oral Given 12/23/21 0946)         PROGRESS, DATA ANALYSIS, CONSULTS, AND MEDICAL DECISION MAKING    All labs have been independently reviewed by me.  All radiology studies have been reviewed by me.   EKG's independently reviewed by me.  Discussion below represents my analysis of pertinent findings related to patient's condition, differential diagnosis, treatment plan and final disposition.    DIFFERENTIAL DIAGNOSIS INCLUDE BUT NOT LIMITED TO: Metabolic encephalopathy, SERGIO, dehydration, anemia, arrhythmia, AMI, USA, viral syndrome, COVID-19, pneumonia,, subarachnoid hemorrhage, subdural hematoma, brain tumor, withdrawal, polypharmacy      ED Course as of 12/23/21 1538   Thu Dec 23, 2021   0750 Discussed pt with Dr michel who reports ct head negative for acute findings.  [JS]   5533 ED work-up unremarkable other than patient's urine is abnormal.  Patient denies any urinary symptoms but is demented.  Patient afebrile, no leukocytosis.  Patient is well-appearing and appears appropriate for discharge with outpatient follow-up.  Will cover with empiric antibiotics.  Ambulate  patient to ensure that she is stable for discharge. [JG]   0933 The patient was reexamined.  They have had symptomatic improvement during their ED stay.  I discussed today's findings with the patient, explaining the pertinent positives and negatives from today's visit, and the plan of care.  Discussed plan for discharge as there is no emergent indication for admission.  Discussed limitation of the ED work-up and that this is to rule out life-threatening emergencies but that they could require further testing as determined by their primary care and or any referred specialist patient is agreeable and understands need for follow-up and repeat exam/testing.  Patient is aware that discharge does not mean there is nothing wrong, indicates no emergency is present, and that they must continue their care with their primary care physician and/or any referred specialist.  They were given appropriate follow-up with their primary care physician and/or specialist.  I had an extensive discussion on the expected clinical course and return precautions.  Patient understands to return to the emergency department for continuation, worsening, or new symptoms.  I answered any of the patient's questions. Patient was discharged home in a stable condition.     [JG]   1537 Hemoglobin(!): 9.8 [JS]   1537 Creatine Kinase: 62 [JS]   1537 Creatinine(!): 0.40 [JS]   1537 BUN(!): 26 [JS]      ED Course User Index  [JG] Leno Valladares MD  [JS] Laura Gregorio, NILS         BP - 111/83  HR - 81  TEMP - 97.8 °F (36.6 °C) (Tympanic)  O2 SATS - 100%         Medication List      New Prescriptions    cephalexin 500 MG capsule  Commonly known as: KEFLEX  Take 1 capsule by mouth 4 (Four) Times a Day for 5 days.        Changed    vitamin D 1.25 MG (84978 UT) capsule capsule  Commonly known as: ERGOCALCIFEROL  Take 1 capsule by mouth Every 7 (Seven) Days. X 4 weeks  What changed: additional instructions           Where to Get Your Medications       These medications were sent to [address] - [address] AT [facility] [address] - [phone]  - [phone] FX  [address], [place] [address]    Phone: [phone]   · cephalexin 500 MG capsule           DIAGNOSIS  Final diagnoses:   Weakness   Abnormal urinalysis   Chronic anemia   Dementia without behavioral disturbance, unspecified dementia type (HCC)         DISPOSITION  Discharge     Pt masked in first look. I wore appropriate PPE throughout my encounters with the pt. I performed hand hygiene on entry into the pt room and upon exit.     Dictated utilizing Dragon dictation:  Much of this encounter note is an electronic transcription/translation of spoken language to printed text. The electronic translation of spoken language may permit erroneous, or at times, nonsensical words or phrases to be inadvertently transcribed; Although I have reviewed the note for such errors, some may still exist.     Laura Gregorio, APRN  12/23/21 153

## 2021-12-23 NOTE — CASE MANAGEMENT/SOCIAL WORK
Per request from RN, spoke [facility] and arranged ambulance transport back to [facility] at 1300.

## 2021-12-23 NOTE — ED NOTES
During assessment, pt pointing to area of her blanket while describing painful area on her heel.      Johanny Varner, RN  12/23/21 9706

## 2021-12-23 NOTE — ED NOTES
No obvious injuries noted. Pt reports pain in R heel and buttocks-pt w/ preexisting pressure injuries in both areas.     Johanny Varner, RN  12/23/21 4167

## 2021-12-23 NOTE — ED NOTES
Pt to triage from [facility] via [facility] [id number] for medical evaluation.  Pt nh staff, pt would not get up off the floor, so they want her evaluated.  Pt has dementia.  Alert to person only at baseline.  Pt wearing mask in triage.  Triage personnel wore appropriate PPE       Twila Butcher, RN  12/23/21 1680

## 2021-12-23 NOTE — PROGRESS NOTES
Clinical Pharmacy Services: Medication History    Marcella Stephens is a 89 y.o. female presenting to [facility] for   Chief Complaint   Patient presents with   • medical evaluation       She  has a past medical history of Acute embolism and thombos unsp deep veins of low extrm, bi (HCC), Dysphagia, Prediabetes, Stroke (HCC), TIA (transient ischemic attack), Urinary tract infection, and Vascular dementia without behavioral disturbance (HCC).    Allergies as of 12/23/2021 - Reviewed 12/23/2021   Allergen Reaction Noted   • Donepezil Nausea And Vomiting 07/13/2012       Medication information was obtained from: Nursing Home   Pharmacy and Phone Number:     Prior to Admission Medications     Prescriptions Last Dose Informant Patient Reported? Taking?    acetaminophen (TYLENOL) 325 MG tablet Past Week Nursing Home No Yes    Take 2 tablets by mouth Every 4 (Four) Hours As Needed for Mild Pain .    apixaban (ELIQUIS) 2.5 MG tablet tablet 12/22/2021 Nursing Home No Yes    Take 1 tablet by mouth Every 12 (Twelve) Hours. Indications: Other - full anticoagulation    aspirin 81 MG EC tablet 12/22/2021 Nursing Home No Yes    Take 1 tablet by mouth Daily.    Cyanocobalamin 1000 MCG sublingual tablet 12/22/2021 Nursing Home Yes Yes    Place 1,000 mcg under the tongue Daily.    docusate sodium 100 MG capsule 12/22/2021 Nursing Home No Yes    Take 1 capsule by mouth 2 (Two) Times a Day.    famotidine (PEPCID) 20 MG tablet 12/22/2021 Nursing Home No Yes    Take 1 tablet by mouth Daily.    galantamine (RAZADYNE) 8 MG tablet 12/22/2021 Nursing Home Yes Yes    Take 8 mg by mouth Daily.    lidocaine (LIDODERM) 5 % 12/22/2021 Nursing Home No Yes    Place 1 patch on the skin as directed by provider Daily. Remove & Discard patch within 12 hours or as directed by MD    melatonin 3 MG tablet Past Month Nursing Home No Yes    Take 1 tablet by mouth At Night As Needed for Sleep.    Multiple Vitamins-Minerals (MULTIVITAMIN  ADULT PO) 12/22/2021 Nursing Home Yes Yes    Take 1 tablet by mouth Daily.    polyethylene glycol (MIRALAX) 17 g packet 12/22/2021 Nursing Home No Yes    Take 17 g by mouth Daily.    traZODone (DESYREL) 50 MG tablet 12/22/2021 Nursing Home Yes Yes    Take 50 mg by mouth Daily.    vitamin D (ERGOCALCIFEROL) 1.25 MG (88663 UT) capsule capsule Past Week Nursing Home No Yes    Take 1 capsule by mouth Every 7 (Seven) Days. X 4 weeks    Patient taking differently:  Take 50,000 Units by mouth Every 7 (Seven) Days. Mondays    magnesium hydroxide (MILK OF MAGNESIA) 400 MG/5ML suspension  Nursing Home Yes No    Take  by mouth Daily As Needed for Constipation.    ondansetron (ZOFRAN) 4 MG tablet More than a month Nursing Home No No    Take 1 tablet by mouth Every 6 (Six) Hours As Needed for Nausea or Vomiting.            Medication notes:     This medication list is complete to the best of my knowledge as of 12/23/2021    Please call if questions.    Jadon Begum  Medication History Technician  [phone]    12/23/2021 08:27 EST

## 2021-12-23 NOTE — ED NOTES
Patient was placed in face mask in first look. Patient was wearing facemask when I entered the room and throughout our encounter. I wore full protective equipment throughout this patient encounter including a face mask, and gloves. Hand hygiene was performed before donning protective equipment and after removal when leaving the room.     Kim Cabrera, RN  12/23/21 0611

## 2024-02-19 ENCOUNTER — HOSPITAL ENCOUNTER (EMERGENCY)
Facility: HOSPITAL | Age: 89
Discharge: NURSING, REHAB OR LONG-TERM CARE | End: 2024-02-19
Attending: STUDENT IN AN ORGANIZED HEALTH CARE EDUCATION/TRAINING PROGRAM | Admitting: STUDENT IN AN ORGANIZED HEALTH CARE EDUCATION/TRAINING PROGRAM
Payer: MEDICARE

## 2024-02-19 ENCOUNTER — APPOINTMENT (OUTPATIENT)
Dept: CT IMAGING | Facility: HOSPITAL | Age: 89
End: 2024-02-19
Payer: MEDICARE

## 2024-02-19 VITALS
RESPIRATION RATE: 14 BRPM | SYSTOLIC BLOOD PRESSURE: 112 MMHG | OXYGEN SATURATION: 92 % | HEIGHT: 62 IN | BODY MASS INDEX: 20.13 KG/M2 | DIASTOLIC BLOOD PRESSURE: 81 MMHG | WEIGHT: 109.41 LBS | TEMPERATURE: 98.3 F | HEART RATE: 79 BPM

## 2024-02-19 DIAGNOSIS — S09.90XA CLOSED HEAD INJURY, INITIAL ENCOUNTER: Primary | ICD-10-CM

## 2024-02-19 DIAGNOSIS — S20.219A CONTUSION OF CHEST WALL, UNSPECIFIED LATERALITY, INITIAL ENCOUNTER: ICD-10-CM

## 2024-02-19 PROCEDURE — 76376 3D RENDER W/INTRP POSTPROCES: CPT

## 2024-02-19 PROCEDURE — 72125 CT NECK SPINE W/O DYE: CPT

## 2024-02-19 PROCEDURE — 71250 CT THORAX DX C-: CPT

## 2024-02-19 PROCEDURE — 70450 CT HEAD/BRAIN W/O DYE: CPT

## 2024-02-19 PROCEDURE — 99284 EMERGENCY DEPT VISIT MOD MDM: CPT

## 2024-02-19 RX ORDER — LIDOCAINE 4 G/G
1 PATCH TOPICAL ONCE
Status: DISCONTINUED | OUTPATIENT
Start: 2024-02-19 | End: 2024-02-20 | Stop reason: HOSPADM

## 2024-02-19 RX ORDER — ACETAMINOPHEN 500 MG
1000 TABLET ORAL ONCE
Status: COMPLETED | OUTPATIENT
Start: 2024-02-19 | End: 2024-02-19

## 2024-02-19 RX ADMIN — ACETAMINOPHEN 1000 MG: 500 TABLET ORAL at 22:25

## 2024-02-19 RX ADMIN — LIDOCAINE 1 PATCH: 4 PATCH TOPICAL at 18:36

## 2024-02-19 NOTE — ED NOTES
Pt fell out of wheelchair.  She has contusions to right eye.  She is on eliquis.  She c/o body wide pain

## 2024-02-19 NOTE — ED PROVIDER NOTES
EMERGENCY DEPARTMENT ENCOUNTER      Room Number:  08/08  PCP: Noemi Chester MD  Independent Historians: Patient/EMS  Patient Care Team:  Noemi Chester MD as PCP - General (Internal Medicine)       HPI:  Chief Complaint: CHI    A complete HPI/ROS/PMH/PSH/SH/FH are unobtainable due to: Dementia, poor historian    Chronic or social conditions impacting patient care (Social Determinants of Health): None      Context: Marcella Stephens is a 89 y.o. female with a PMH significant for Alzheimer's dementia, acute DVT anticoagulated on Eliquis who presents to the ED c/o acute facial injury after a fall today.  The patient reportedly fell from her wheelchair at her living facility and struck the right side of her face on the ground.  No loss of consciousness.  No numbness or weakness of the face or extremities, slurred speech, visual disturbance, photophobia.  She describes an aching discomfort across the anterior chest as well where there is no overlying bruising or tenderness.  She does take Eliquis.  No other injuries or concerns.  No neck pain.      Upon review of prior external notes (non-ED) -and- Review of prior external test results outside of this encounter it appears the patient was evaluated in the office with orthopedics for right hip fracture on 9/20/2021.  The patient had a normal magnesium on 10/29/2021 and a normal glucose on 11/3/2021.      PAST MEDICAL HISTORY  Active Ambulatory Problems     Diagnosis Date Noted    Closed fracture of right hip 08/27/2021    Fall as cause of accidental injury at home as place of occurrence 08/29/2021    Alzheimer's dementia 08/29/2021    Anemia 08/30/2021    Vitamin D deficiency 08/30/2021    Weakness of right upper extremity 08/31/2021    Acute retention of urine 08/31/2021    Hydronephrosis 08/31/2021    Acute deep vein thrombosis (DVT) of brachial vein of right upper extremity 09/01/2021    Recent cerebrovascular accident (CVA) 09/03/2021    DVT of lower extremity,  bilateral 09/04/2021    Acute UTI 10/22/2021    History of DVT (deep vein thrombosis) 10/23/2021    Age-related physical debility 10/23/2021    Hiatal hernia 10/27/2021     Resolved Ambulatory Problems     Diagnosis Date Noted    Traumatic rhabdomyolysis 08/28/2021    Acute hyperglycemia 08/29/2021    Acute kidney failure 08/30/2021    Elevated troponin 10/23/2021    Postprandial vomiting 10/27/2021    Hypokalemia 10/27/2021     Past Medical History:   Diagnosis Date    Acute embolism and thombos unsp deep veins of low extrm, bi     Dysphagia     Prediabetes     Stroke     TIA (transient ischemic attack)     Urinary tract infection     Vascular dementia without behavioral disturbance          PAST SURGICAL HISTORY  Past Surgical History:   Procedure Laterality Date    BACK SURGERY      FEMUR IM NAILING/RODDING Right 8/28/2021    Procedure: FEMUR INTRAMEDULLARY NAILING/RODDING;  Surgeon: Louis Scruggs MD;  Location: [facility];  Service: Orthopedics;  Laterality: Right;    KNEE SURGERY           FAMILY HISTORY  History reviewed. No pertinent family history.      SOCIAL HISTORY  Social History     Socioeconomic History    Marital status:    Tobacco Use    Smoking status: Never    Smokeless tobacco: Never   Vaping Use    Vaping Use: Never used   Substance and Sexual Activity    Alcohol use: No    Drug use: No    Sexual activity: Defer         ALLERGIES  Donepezil      REVIEW OF SYSTEMS  Included in HPI  All systems reviewed and negative except for those discussed in HPI.      PHYSICAL EXAM    I have reviewed the triage vital signs and nursing notes.    ED Triage Vitals [02/19/24 1804]   Temp Heart Rate Resp BP SpO2   98.3 °F (36.8 °C) 66 16 106/54 95 %      Temp src Heart Rate Source Patient Position BP Location FiO2 (%)   Oral Monitor -- -- --       Physical Exam  Constitutional:       General: She is not in acute distress.     Appearance: She is well-developed. She is not ill-appearing.   HENT:       Head: Normocephalic and atraumatic.     Eyes:      General: No scleral icterus.     Conjunctiva/sclera: Conjunctivae normal.   Neck:      Trachea: No tracheal deviation.   Cardiovascular:      Rate and Rhythm: Normal rate and regular rhythm.   Pulmonary:      Effort: Pulmonary effort is normal.      Breath sounds: Normal breath sounds.   Abdominal:      Palpations: Abdomen is soft.      Tenderness: There is no abdominal tenderness.   Musculoskeletal:         General: No deformity.      Cervical back: Normal range of motion. No spinous process tenderness or muscular tenderness.   Lymphadenopathy:      Cervical: No cervical adenopathy.   Skin:     General: Skin is warm and dry.   Neurological:      Mental Status: She is alert and oriented to person, place, and time.      Sensory: Sensation is intact.      Motor: Motor function is intact.   Psychiatric:         Behavior: Behavior normal.         Vital signs and nursing notes reviewed.      PPE: I wore a surgical mask throughout my encounters with the pt. I performed hand hygiene on entry into the pt room and upon exit.      LAB RESULTS  No results found for this or any previous visit (from the past 24 hour(s)).      RADIOLOGY  CT Chest Without Contrast Diagnostic    Result Date: 2/19/2024  CT CHEST WO CONTRAST DIAGNOSTIC-  Radiation dose reduction techniques were utilized, including automated exposure control and exposure modulation based on body size.  Clinical: Fell with sternal pain  FINDINGS: 1. There is a wedge compression deformity of the T10 vertebrae. No bony fragment extending into the spinal canal. Age indeterminant. Possibly old. No sternal fracture. There is a hairline fracture through the anterior right second and third ribs. Possible hairline fracture through the anterior fifth rib. All of these near the costochondral junctions. There are old left rib deformities.  2. There are small to moderate size bilateral free-flowing pleural effusions, attenuation  compatible with serous fluid. There is compressive atelectasis demonstrated at the base of both lower lobes. There is subpleural atelectasis at the base of the lingular segment as well. No suspicious lung lesion is demonstrated. No pneumothorax seen.  3. Moderate size hiatal hernia. There is cardiac enlargement. There is coronary artery calcification. No pericardial effusion. There is atherosclerotic calcification of a normal diameter aorta. There is gas and an air-fluid level within the esophagus which is moderately distended, likely distal dysmotility. There is small mediastinal lymph nodes seen.  4. Limited images through the upper abdomen demonstrate a hepatic cysts, likely sludge within the gallbladder. The remainder is unremarkable.     This report was finalized on 2/19/2024 8:11 PM by Dr. Chet Spears M.D on Workstation: [id number]      CT Head Without Contrast, CT Cervical Spine Without Contrast    Result Date: 2/19/2024  CT HEAD WO CONTRAST-, CT CERVICAL SPINE WO CONTRAST-  INDICATIONS: Trauma  TECHNIQUE: Radiation dose reduction techniques were utilized, including automated exposure control and exposure modulation based on body size. Noncontrast head CT, cervical spine CT  COMPARISON: None available  FINDINGS:  Head CT:  No acute intracranial hemorrhage, midline shift or mass effect. No acute territorial infarct is identified.  Prominent periventricular hypodensities suggest chronic small vessel ischemic change in a patient this age.  Arterial calcifications are seen at the base of the brain.  Ventricles, cisterns, cerebral sulci are unremarkable for patient age.  Mild paranasal sinus mucosal thickening is present. Small partial desiccation of right mastoid air cells is noted. The visualized paranasal sinuses, orbits, mastoid air cells are otherwise unremarkable.   Cervical spine CT:  No acute fracture or malalignment is identified.  Facet and uncovertebral joint hypertrophy contribute to neuroforaminal  narrowing, more prominent on the right at C3/4 and on the left at C4/5. Pannus/calcified hypertrophied synovium is conspicuous at the anterior aspect of the spinal canal at the level of C1.  Bilateral carotid arterial calcifications are present.  Septal thickening is apparent at the lung apices, may represent edema, with bilateral pleural effusions.        No acute intracranial hemorrhage or hydrocephalus. No acute cervical fracture identified; degenerative changes in the cervical spine. If there is further clinical concern, MRI could be considered for further evaluation.  Evidence of pulmonary edema with bilateral pleural effusions.  This report was finalized on 2/19/2024 7:51 PM by Dr. Rick Ely M.D on Workstation: [id number]         MEDICATIONS GIVEN IN ER  Medications   Lidocaine 4 % 1 patch (1 patch Transdermal Medication Applied 2/19/24 1836)         ORDERS PLACED DURING THIS VISIT:  Orders Placed This Encounter   Procedures    CT Chest Without Contrast Diagnostic    CT Head Without Contrast    CT Cervical Spine Without Contrast         OUTPATIENT MEDICATION MANAGEMENT:  Current Facility-Administered Medications Ordered in Epic   Medication Dose Route Frequency Provider Last Rate Last Admin    Lidocaine 4 % 1 patch  1 patch Transdermal Once Osiel Olson PA   1 patch at 02/19/24 1836     Current Outpatient Medications Ordered in Epic   Medication Sig Dispense Refill    acetaminophen (TYLENOL) 325 MG tablet Take 2 tablets by mouth Every 4 (Four) Hours As Needed for Mild Pain . 120 tablet 3    apixaban (ELIQUIS) 2.5 MG tablet tablet Take 1 tablet by mouth Every 12 (Twelve) Hours. Indications: Other - full anticoagulation 60 tablet     aspirin 81 MG EC tablet Take 1 tablet by mouth Daily. 30 tablet 3    Cyanocobalamin 1000 MCG sublingual tablet Place 1,000 mcg under the tongue Daily.      docusate sodium 100 MG capsule Take 1 capsule by mouth 2 (Two) Times a Day. 60 capsule 3    famotidine (PEPCID) 20  MG tablet Take 1 tablet by mouth Daily. 30 tablet 3    galantamine (RAZADYNE) 8 MG tablet Take 8 mg by mouth Daily.      lidocaine (LIDODERM) 5 % Place 1 patch on the skin as directed by provider Daily. Remove & Discard patch within 12 hours or as directed by MD 30 patch 3    magnesium hydroxide (MILK OF MAGNESIA) 400 MG/5ML suspension Take  by mouth Daily As Needed for Constipation.      melatonin 3 MG tablet Take 1 tablet by mouth At Night As Needed for Sleep. 30 tablet 3    Multiple Vitamins-Minerals (MULTIVITAMIN ADULT PO) Take 1 tablet by mouth Daily.      ondansetron (ZOFRAN) 4 MG tablet Take 1 tablet by mouth Every 6 (Six) Hours As Needed for Nausea or Vomiting. 30 tablet 3    polyethylene glycol (MIRALAX) 17 g packet Take 17 g by mouth Daily. 30 packet 3    traZODone (DESYREL) 50 MG tablet Take 50 mg by mouth Daily.      vitamin D (ERGOCALCIFEROL) 1.25 MG (70920 UT) capsule capsule Take 1 capsule by mouth Every 7 (Seven) Days. X 4 weeks (Patient taking differently: Take 50,000 Units by mouth Every 7 (Seven) Days. Mondays)             PROGRESS, DATA ANALYSIS, CONSULTS, AND MEDICAL DECISION MAKING  All labs have been independently interpreted by me.  All radiology studies have been reviewed by me. All EKG's have been independently viewed and interpreted by me.  Discussion below represents my analysis of pertinent findings related to patient's condition, differential diagnosis, treatment plan and final disposition.        DIFFERENTIAL DIAGNOSIS INCLUDE BUT NOT LIMITED TO:     Chest contusion, rib fracture, sternal fracture, intracranial bleeding    Clinical Scores: N/A      ED Course as of 02/19/24 2106 Mon Feb 19, 2024 2017 My independent interpretation of the CT chest without contrast is no acute fracture.   [DC]   2017 Patient presentation and evaluation consistent with uncomplicated contusion to the chest wall.  She has evidence of a minor head injury as well.  Reassuring ED evaluation including CT  images of the head, chest.  Patient tolerates p.o. intake on reassessment and symptoms are well-controlled.  No indication for further evaluation or admission.   She does have evidence of an age-indeterminate T10 fracture but no spinous process tenderness or symptoms otherwise to explain this in the acute setting.  No back pain.  The fall was mechanical in nature and there is no evaluation required for lightheadedness or syncope. [DC]      ED Course User Index  [DC] Osiel Olson, PA         2019 I rechecked the patient.  I discussed the patient's radiology findings (including all incidental findings), diagnosis, and plan for discharge.  A repeat exam reveals no new worrisome changes from my initial exam findings.  The patient understands that the fact that they are being discharged does not denote that nothing is abnormal, it indicates that no clinical emergency is present and that they must follow-up as directed in order to properly maintain their health.  Follow-up instructions (specifically listed below) and return to ER precautions were given at this time.  I specifically instructed the patient to follow-up with their PCP.  The patient understands and agrees with the plan, and is ready for discharge.  All questions answered.        AS OF 20:19 EST VITALS:    BP - 106/54  HR - 66  TEMP - 98.3 °F (36.8 °C) (Oral)  O2 SATS - 95%    COMPLEXITY OF CARE  Admission was considered but after careful review of the patient's presentation, physical examination, diagnostic results, and response to treatment the patient may be safely discharged with outpatient follow-up.      DIAGNOSIS  Final diagnoses:   Closed head injury, initial encounter   Contusion of chest wall, unspecified laterality, initial encounter         DISPOSITION  ED Disposition       ED Disposition   Discharge    Condition   Stable    Comment   --                Please note that portions of this document were completed with a voice recognition  program.    Note Disclaimer: At [facility], we believe that sharing information builds trust and better relationships. You are receiving this note because you recently visited [facility]. It is possible you will see health information before a provider has talked with you about it. This kind of information can be easy to misunderstand. To help you fully understand what it means for your health, we urge you to discuss this note with your provider.         Osiel Olson PA  02/21/24 0550

## 2024-02-21 ENCOUNTER — HOSPITAL ENCOUNTER (OUTPATIENT)
Facility: HOSPITAL | Age: 89
Setting detail: OBSERVATION
LOS: 2 days | Discharge: NURSING, REHAB OR LONG-TERM CARE | End: 2024-02-26
Attending: EMERGENCY MEDICINE | Admitting: HOSPITALIST
Payer: MEDICARE

## 2024-02-21 DIAGNOSIS — R53.1 GENERALIZED WEAKNESS: ICD-10-CM

## 2024-02-21 DIAGNOSIS — D64.9 ACUTE ANEMIA: Primary | ICD-10-CM

## 2024-02-21 LAB
ABO GROUP BLD: NORMAL
ALBUMIN SERPL-MCNC: 3.5 G/DL (ref 3.5–5.2)
ALBUMIN/GLOB SERPL: 1.5 G/DL
ALP SERPL-CCNC: 65 U/L (ref 39–117)
ALT SERPL W P-5'-P-CCNC: 9 U/L (ref 1–33)
ANION GAP SERPL CALCULATED.3IONS-SCNC: 6 MMOL/L (ref 5–15)
ANISOCYTOSIS BLD QL: ABNORMAL
APTT PPP: 29.9 SECONDS (ref 22.7–35.4)
AST SERPL-CCNC: 10 U/L (ref 1–32)
BASOPHILS # BLD MANUAL: 0.07 10*3/MM3 (ref 0–0.2)
BASOPHILS NFR BLD MANUAL: 1 % (ref 0–1.5)
BILIRUB SERPL-MCNC: 0.3 MG/DL (ref 0–1.2)
BLD GP AB SCN SERPL QL: NEGATIVE
BUN SERPL-MCNC: 21 MG/DL (ref 8–23)
BUN/CREAT SERPL: 32.3 (ref 7–25)
CALCIUM SPEC-SCNC: 8.9 MG/DL (ref 8.2–9.6)
CHLORIDE SERPL-SCNC: 107 MMOL/L (ref 98–107)
CO2 SERPL-SCNC: 29 MMOL/L (ref 22–29)
CREAT SERPL-MCNC: 0.65 MG/DL (ref 0.57–1)
DACRYOCYTES BLD QL SMEAR: ABNORMAL
DEPRECATED RDW RBC AUTO: 43.9 FL (ref 37–54)
EGFRCR SERPLBLD CKD-EPI 2021: 78.7 ML/MIN/1.73
EOSINOPHIL # BLD MANUAL: 0.22 10*3/MM3 (ref 0–0.4)
EOSINOPHIL NFR BLD MANUAL: 3.1 % (ref 0.3–6.2)
ERYTHROCYTE [DISTWIDTH] IN BLOOD BY AUTOMATED COUNT: 18.9 % (ref 12.3–15.4)
GLOBULIN UR ELPH-MCNC: 2.4 GM/DL
GLUCOSE SERPL-MCNC: 107 MG/DL (ref 65–99)
HCT VFR BLD AUTO: 18.5 % (ref 34–46.6)
HCT VFR BLD AUTO: 28 % (ref 34–46.6)
HGB BLD-MCNC: 4.6 G/DL (ref 12–15.9)
HGB BLD-MCNC: 8.2 G/DL (ref 12–15.9)
HYPOCHROMIA BLD QL: ABNORMAL
INR PPP: 1.25 (ref 0.9–1.1)
LYMPHOCYTES # BLD MANUAL: 1.18 10*3/MM3 (ref 0.7–3.1)
LYMPHOCYTES NFR BLD MANUAL: 12.2 % (ref 5–12)
MCH RBC QN AUTO: 16.4 PG (ref 26.6–33)
MCHC RBC AUTO-ENTMCNC: 24.9 G/DL (ref 31.5–35.7)
MCV RBC AUTO: 66.1 FL (ref 79–97)
MICROCYTES BLD QL: ABNORMAL
MONOCYTES # BLD: 0.88 10*3/MM3 (ref 0.1–0.9)
NEUTROPHILS # BLD AUTO: 4.88 10*3/MM3 (ref 1.7–7)
NEUTROPHILS NFR BLD MANUAL: 67.3 % (ref 42.7–76)
NRBC SPEC MANUAL: 1 /100 WBC (ref 0–0.2)
OVALOCYTES BLD QL SMEAR: ABNORMAL
PLAT MORPH BLD: NORMAL
PLATELET # BLD AUTO: 264 10*3/MM3 (ref 140–450)
PMV BLD AUTO: 9.9 FL (ref 6–12)
POIKILOCYTOSIS BLD QL SMEAR: ABNORMAL
POTASSIUM SERPL-SCNC: 4.3 MMOL/L (ref 3.5–5.2)
PROT SERPL-MCNC: 5.9 G/DL (ref 6–8.5)
PROTHROMBIN TIME: 15.9 SECONDS (ref 11.7–14.2)
RBC # BLD AUTO: 2.8 10*6/MM3 (ref 3.77–5.28)
RETICS # AUTO: 0.04 10*6/MM3 (ref 0.02–0.13)
RETICS/RBC NFR AUTO: 1.05 % (ref 0.7–1.9)
RH BLD: POSITIVE
SCHISTOCYTES BLD QL SMEAR: ABNORMAL
SODIUM SERPL-SCNC: 142 MMOL/L (ref 136–145)
STOMATOCYTES BLD QL SMEAR: ABNORMAL
T&S EXPIRATION DATE: NORMAL
VARIANT LYMPHS NFR BLD MANUAL: 16.3 % (ref 19.6–45.3)
WBC MORPH BLD: NORMAL
WBC NRBC COR # BLD AUTO: 7.25 10*3/MM3 (ref 3.4–10.8)

## 2024-02-21 PROCEDURE — 85610 PROTHROMBIN TIME: CPT | Performed by: EMERGENCY MEDICINE

## 2024-02-21 PROCEDURE — 85007 BL SMEAR W/DIFF WBC COUNT: CPT | Performed by: EMERGENCY MEDICINE

## 2024-02-21 PROCEDURE — 85730 THROMBOPLASTIN TIME PARTIAL: CPT | Performed by: EMERGENCY MEDICINE

## 2024-02-21 PROCEDURE — 80053 COMPREHEN METABOLIC PANEL: CPT | Performed by: EMERGENCY MEDICINE

## 2024-02-21 PROCEDURE — 85045 AUTOMATED RETICULOCYTE COUNT: CPT | Performed by: HOSPITALIST

## 2024-02-21 PROCEDURE — 85018 HEMOGLOBIN: CPT | Performed by: HOSPITALIST

## 2024-02-21 PROCEDURE — 86900 BLOOD TYPING SEROLOGIC ABO: CPT | Performed by: EMERGENCY MEDICINE

## 2024-02-21 PROCEDURE — 36430 TRANSFUSION BLD/BLD COMPNT: CPT

## 2024-02-21 PROCEDURE — 86923 COMPATIBILITY TEST ELECTRIC: CPT

## 2024-02-21 PROCEDURE — 86901 BLOOD TYPING SEROLOGIC RH(D): CPT | Performed by: EMERGENCY MEDICINE

## 2024-02-21 PROCEDURE — 85025 COMPLETE CBC W/AUTO DIFF WBC: CPT | Performed by: EMERGENCY MEDICINE

## 2024-02-21 PROCEDURE — 85014 HEMATOCRIT: CPT | Performed by: HOSPITALIST

## 2024-02-21 PROCEDURE — 99291 CRITICAL CARE FIRST HOUR: CPT

## 2024-02-21 PROCEDURE — 92610 EVALUATE SWALLOWING FUNCTION: CPT

## 2024-02-21 PROCEDURE — P9016 RBC LEUKOCYTES REDUCED: HCPCS

## 2024-02-21 PROCEDURE — 86900 BLOOD TYPING SEROLOGIC ABO: CPT

## 2024-02-21 PROCEDURE — 83880 ASSAY OF NATRIURETIC PEPTIDE: CPT | Performed by: HOSPITALIST

## 2024-02-21 PROCEDURE — 86850 RBC ANTIBODY SCREEN: CPT | Performed by: EMERGENCY MEDICINE

## 2024-02-21 RX ORDER — UREA 10 %
3 LOTION (ML) TOPICAL NIGHTLY PRN
Status: DISCONTINUED | OUTPATIENT
Start: 2024-02-21 | End: 2024-02-26

## 2024-02-21 RX ORDER — FUROSEMIDE 20 MG/1
20 TABLET ORAL DAILY
COMMUNITY
End: 2024-02-26 | Stop reason: HOSPADM

## 2024-02-21 RX ORDER — DOCUSATE SODIUM 100 MG/1
100 CAPSULE, LIQUID FILLED ORAL 2 TIMES DAILY
Status: DISCONTINUED | OUTPATIENT
Start: 2024-02-21 | End: 2024-02-26 | Stop reason: HOSPADM

## 2024-02-21 RX ORDER — POLYETHYLENE GLYCOL 3350 17 G/17G
17 POWDER, FOR SOLUTION ORAL DAILY
Status: DISCONTINUED | OUTPATIENT
Start: 2024-02-21 | End: 2024-02-26 | Stop reason: HOSPADM

## 2024-02-21 RX ORDER — GALANTAMINE HYDROBROMIDE 4 MG/1
8 TABLET, FILM COATED ORAL DAILY
Status: DISCONTINUED | OUTPATIENT
Start: 2024-02-21 | End: 2024-02-26 | Stop reason: HOSPADM

## 2024-02-21 RX ORDER — SODIUM CHLORIDE 0.9 % (FLUSH) 0.9 %
10 SYRINGE (ML) INJECTION AS NEEDED
Status: DISCONTINUED | OUTPATIENT
Start: 2024-02-21 | End: 2024-02-26 | Stop reason: HOSPADM

## 2024-02-21 RX ORDER — MULTIPLE VITAMINS W/ MINERALS TAB 9MG-400MCG
1 TAB ORAL DAILY
Status: DISCONTINUED | OUTPATIENT
Start: 2024-02-21 | End: 2024-02-26 | Stop reason: HOSPADM

## 2024-02-21 RX ORDER — PANTOPRAZOLE SODIUM 40 MG/1
40 TABLET, DELAYED RELEASE ORAL
Status: DISCONTINUED | OUTPATIENT
Start: 2024-02-22 | End: 2024-02-21

## 2024-02-21 RX ORDER — PANTOPRAZOLE SODIUM 40 MG/1
40 TABLET, DELAYED RELEASE ORAL
Status: DISCONTINUED | OUTPATIENT
Start: 2024-02-21 | End: 2024-02-26 | Stop reason: HOSPADM

## 2024-02-21 RX ORDER — PANTOPRAZOLE SODIUM 40 MG/1
40 TABLET, DELAYED RELEASE ORAL DAILY
COMMUNITY
End: 2024-02-26 | Stop reason: HOSPADM

## 2024-02-21 RX ORDER — TRAZODONE HYDROCHLORIDE 50 MG/1
50 TABLET ORAL DAILY
Status: DISCONTINUED | OUTPATIENT
Start: 2024-02-21 | End: 2024-02-26 | Stop reason: HOSPADM

## 2024-02-21 RX ADMIN — PANTOPRAZOLE SODIUM 40 MG: 40 TABLET, DELAYED RELEASE ORAL at 17:40

## 2024-02-21 RX ADMIN — Medication 1 TABLET: at 15:04

## 2024-02-21 RX ADMIN — DOCUSATE SODIUM 100 MG: 100 CAPSULE, LIQUID FILLED ORAL at 20:11

## 2024-02-21 RX ADMIN — GALANTAMINE 8 MG: 4 TABLET, FILM COATED ORAL at 15:04

## 2024-02-21 RX ADMIN — DOCUSATE SODIUM 100 MG: 100 CAPSULE, LIQUID FILLED ORAL at 15:04

## 2024-02-21 NOTE — ED NOTES
Contacted all family in chart and received no answers. Due to pt's hemoglobin of 4.6, nurse got consent from MD Colón) to emergently transfuse without consent from family.

## 2024-02-21 NOTE — DISCHARGE PLACEMENT REQUEST
"Cuba Stephens (89 y.o. Female)       Date of Birth   12/28/1923    Social Security Number       Address   [address] [address]    Home Phone   [phone]    MRN   [id number]       Mandaeism   Rastafari    Marital Status                               Admission Date   2/21/24    Admission Type   Emergency    Admitting Provider   Delio Melchor MD    Attending Provider   Delio Melchor MD    Department, Room/Bed   [address], [address]       Discharge Date       Discharge Disposition       Discharge Destination                                 Attending Provider: Delio Melchor MD    Allergies: Donepezil    Isolation: None   Infection: None   Code Status: No CPR    Ht: 157.5 cm (62\")   Wt: 49.4 kg (109 lb)    Admission Cmt: None   Principal Problem: Acute anemia [D64.9]                   Active Insurance as of 2/21/2024       Primary Coverage       Payor Plan Insurance Group Employer/Plan Group    HUMANA MEDICARE REPLACEMENT HU[employer] MED ADV GROUP [id number]       Payor Plan Address Payor Plan Phone Number Payor Plan Fax Number Effective Dates    [address] [address] [phone]  1/1/2013 - None Entered    [facility] [address]         Subscriber Name Subscriber Birth Date Member ID       CUBA STEPHENS 12/28/1923 [id number]                     Emergency Contacts        (Rel.) Home Phone Work Phone Mobile Phone    Bonita Reddy (Daughter) [phone] -- [phone]    Joe Stephens (Son) -- -- [phone]    ALCON GARCIA (Other) -- -- [phone]              "

## 2024-02-21 NOTE — PLAN OF CARE
Goal Outcome Evaluation:           Progress: improving          VSS.  A-Ox3.  Pt approved for diet by speech.  Pt did have an episode of spitting up some of the food she had eaten for lunch.  Pt stated that this does sometimes happen to her and she did not have any nausea.  MD notified.  Pt has received 2 units PRBC's.  Will CTM.

## 2024-02-21 NOTE — ED PROVIDER NOTES
EMERGENCY DEPARTMENT MD ATTESTATION NOTE    SHARED VISIT: This visit was performed by BOTH a physician and an APC. The substantive portion of the medical decision making was performed by this attesting physician who made or approved the management plan and takes responsibility for patient management. All studies documented in the APC note (if performed) were independently interpreted by me.    The OPAL and I have discussed this patient's history, physical exam, MDM, and treatment plan.  I have reviewed the documentation and personally had a face to face interaction with the patient. The attached note describes my personal findings.        Room Number:  08/08  PCP: Noemi Chseter MD  Independent Historians: Patient and EMS    HPI:    Context: Marcella Stephens is a 89 y.o. female with a medical history of Alzheimer's, DVT on Eliquis who presents to the ED c/o acute head injury.  Patient experienced mechanical fall from her wheelchair at her living facility earlier today.  Patient did strike the right side of her face on the ground.        Review of prior external notes (non-ED) -and- Review of prior external test results outside of this encounter: [facility] EPIC system [facility] no prior visit notes and no prior diagnostic studies available for review.    PHYSICAL EXAM    I have reviewed the triage vital signs and nursing notes.    ED Triage Vitals [02/19/24 1804]   Temp Heart Rate Resp BP SpO2   98.3 °F (36.8 °C) 66 16 106/54 95 %      Temp src Heart Rate Source Patient Position BP Location FiO2 (%)   Oral Monitor -- -- --       Physical Exam  GENERAL: alert, no acute distress  SKIN: Warm, dry  HENT: Normocephalic, Tenderness to palpation with ecchymosis noted around the right orbit  EYES: no scleral icterus  CV: regular rhythm, regular rate  RESPIRATORY: normal effort, lungs clear  ABDOMEN: soft, nontender, nondistended  MUSCULOSKELETAL: no deformity  NEURO: alert, moves  all extremities, follows commands      MEDICATIONS GIVEN IN ER  Medications   acetaminophen (TYLENOL) tablet 1,000 mg (1,000 mg Oral Given 2/19/24 2225)         ORDERS PLACED DURING THIS VISIT:  Orders Placed This Encounter   Procedures    CT Chest Without Contrast Diagnostic    CT Head Without Contrast    CT Cervical Spine Without Contrast           PROGRESS, DATA ANALYSIS, CONSULTS, AND MEDICAL DECISION MAKING  All labs have been independently interpreted by me.  All radiology studies have been reviewed by me. All EKG's have been independently viewed and interpreted by me.  Discussion below represents my analysis of pertinent findings related to patient's condition, differential diagnosis, treatment plan and final disposition.    Differential diagnosis includes but is not limited to orbital fracture, intracranial hemorrhage, C-spine fracture.    Clinical Scores:                   ED Course as of 02/21/24 1721   Mon Feb 19, 2024 2017 My independent interpretation of the CT chest without contrast is no acute fracture.   [DC]   2017 Patient presentation and evaluation consistent with uncomplicated contusion to the chest wall.  She has evidence of a minor head injury as well.  Reassuring ED evaluation including CT images of the head, chest.  Patient tolerates p.o. intake on reassessment and symptoms are well-controlled.  No indication for further evaluation or admission.   She does have evidence of an age-indeterminate T10 fracture but no spinous process tenderness or symptoms otherwise to explain this in the acute setting.  No back pain.  The fall was mechanical in nature and there is no evaluation required for lightheadedness or syncope. [DC]      ED Course User Index  [DC] Osiel Olson PA       MDM: 89-year-old female presenting for evaluation of mechanical fall.  Patient slipped from her wheelchair and struck the right side of her face on the ground.  Radiological evaluation is overall unremarkable.   Patient will be discharged back to nursing facility.      COMPLEXITY OF CARE  Admission was considered but after careful review of the patient's presentation, physical examination, diagnostic results, and response to treatment the patient may be safely discharged with outpatient follow-up.    Please note that portions of this document were completed with a voice recognition program.    Note Disclaimer: At [facility], we believe that sharing information builds trust and better relationships. You are receiving this note because you recently visited [facility]. It is possible you will see health information before a provider has talked with you about it. This kind of information can be easy to misunderstand. To help you fully understand what it means for your health, we urge you to discuss this note with your provider.         Tboi Thomas MD  02/21/24 9285

## 2024-02-21 NOTE — H&P
History and physical    Primary care physician      Chief complaint  Generalized weakness    History of present illness  70-year-old white female with history of severe dementia TIA CVA osteoarthritis and chronic anemia sent to the ER at [facility] with complaint of generalized weakness and laboratory showed significant anemia.  Patient is poor historian but denies any abdominal pain nausea vomiting black stools or blood in the stools.  Patient is on Eliquis for history of DVT.  Patient workup in ER revealed symptomatic anemia admitted for transfusion and anemia workup.  Patient is DNR per her wishes.    PAST MEDICAL HISTORY   Anemia      Depression      Prediabetes      DVT on Eliquis      Transient ischemic attack      Osteoarthritis      Vascular dementia         PAST SURGICAL HISTORY              Procedure Laterality Date    BACK SURGERY        FEMUR IM NAILING/RODDING Right 8/28/2021     Procedure: FEMUR INTRAMEDULLARY NAILING/RODDING;  Surgeon: Louis Scruggs MD;  Location: [facility];  Service: Orthopedics;  Laterality: Right;    KNEE SURGERY             FAMILY HISTORY  History reviewed. No pertinent family history.     SOCIAL HISTORY                Socioeconomic History    Marital status:    Tobacco Use    Smoking status: Never    Smokeless tobacco: Never   Vaping Use    Vaping Use: Never used   Substance and Sexual Activity    Alcohol use: No    Drug use: No    Sexual activity: Defer         ALLERGIES  Donepezil  Nursing home medications    REVIEW OF SYSTEMS  Constitutional:  Negative for fever.   HENT:  Negative for sore throat.    Eyes: Negative.    Respiratory:  Negative for cough and shortness of breath.    Cardiovascular:  Negative for chest pain.   Gastrointestinal:  Negative for abdominal pain, diarrhea and vomiting.   Genitourinary:  Negative for dysuria.   Musculoskeletal:  Negative for neck pain.   Skin:  Negative for rash.   Allergic/Immunologic: Negative.   "  Neurological:  Positive for weakness. Negative for numbness and headaches.   Hematological: Negative.    Psychiatric/Behavioral: Negative.     All other systems reviewed and are negative.     PHYSICAL EXAM  Blood pressure 113/65, pulse 77, temperature 97.2 °F (36.2 °C), temperature source Oral, resp. rate 18, height 157.5 cm (62\"), weight 49.4 kg (109 lb), SpO2 90%.    Constitutional:       General: She is not in acute distress.     Appearance: Normal appearance. She is not ill-appearing or toxic-appearing.   HENT:      Head: Normocephalic and atraumatic.   Eyes:      Extraocular Movements: Extraocular movements intact.      Pupils: Pupils are equal, round, and reactive to light.   Cardiovascular:      Rate and Rhythm: Normal rate and regular rhythm.      Heart sounds: No murmur heard.     No friction rub. No gallop.   Pulmonary:      Effort: Pulmonary effort is normal.      Breath sounds: Normal breath sounds.   Abdominal:      General: Abdomen is flat. There is no distension.      Palpations: Abdomen is soft.      Tenderness: There is no abdominal tenderness.   Musculoskeletal:         General: No swelling or tenderness. Normal range of motion.      Cervical back: Normal range of motion and neck supple.   Skin:     General: Skin is warm and dry.      Coloration: Skin is pale.   Neurological:      General: No focal deficit present.      Mental Status: She is alert and oriented to person, place, and time.      Sensory: No sensory deficit.      Motor: No weakness.   Psychiatric:         Mood and Affect: Mood normal.         Behavior: Behavior normal.      LAB RESULTS  Lab Results (last 24 hours)       Procedure Component Value Units Date/Time    Manual Differential [[id number]]  (Abnormal) Collected: 02/21/24 0240    Specimen: Blood Updated: 02/21/24 0332     Neutrophil % 67.3 %      Lymphocyte % 16.3 %      Monocyte % 12.2 %      Eosinophil % 3.1 %      Basophil % 1.0 %      Neutrophils Absolute 4.88 10*3/mm3      " Lymphocytes Absolute 1.18 10*3/mm3      Monocytes Absolute 0.88 10*3/mm3      Eosinophils Absolute 0.22 10*3/mm3      Basophils Absolute 0.07 10*3/mm3      nRBC 1.0 /100 WBC      Anisocytosis Large/3+     Dacrocytes Slight/1+     Hypochromia Large/3+     Microcytes Large/3+     Ovalocytes Mod/2+     Poikilocytes Mod/2+     Schistocytes Slight/1+     Stomatocytes Slight/1+     WBC Morphology Normal     Platelet Morphology Normal    CBC & Differential [[id number]]  (Abnormal) Collected: 02/21/24 0240    Specimen: Blood Updated: 02/21/24 0317    Narrative:      The following orders were created for panel order CBC & Differential.  Procedure                               Abnormality         Status                     ---------                               -----------         ------                     CBC Auto Differential[[id number]]        Abnormal            Final result                 Please view results for these tests on the individual orders.    CBC Auto Differential [[id number]]  (Abnormal) Collected: 02/21/24 0240    Specimen: Blood Updated: 02/21/24 0317     WBC 7.25 10*3/mm3      RBC 2.80 10*6/mm3      Hemoglobin 4.6 g/dL      Hematocrit 18.5 %      MCV 66.1 fL      MCH 16.4 pg      MCHC 24.9 g/dL      RDW 18.9 %      RDW-SD 43.9 fl      MPV 9.9 fL      Platelets 264 10*3/mm3     aPTT [[id number]]  (Normal) Collected: 02/21/24 0240    Specimen: Blood Updated: 02/21/24 0315     PTT 29.9 seconds     Protime-INR [[id number]]  (Abnormal) Collected: 02/21/24 0240    Specimen: Blood Updated: 02/21/24 0315     Protime 15.9 Seconds      INR 1.25    Comprehensive Metabolic Panel [[id number]]  (Abnormal) Collected: 02/21/24 0240    Specimen: Blood Updated: 02/21/24 0312     Glucose 107 mg/dL      BUN 21 mg/dL      Creatinine 0.65 mg/dL      Sodium 142 mmol/L      Potassium 4.3 mmol/L      Chloride 107 mmol/L      CO2 29.0 mmol/L      Calcium 8.9 mg/dL      Total Protein 5.9 g/dL      Albumin 3.5 g/dL      ALT (SGPT) 9 U/L       AST (SGOT) 10 U/L      Alkaline Phosphatase 65 U/L      Total Bilirubin 0.3 mg/dL      Globulin 2.4 gm/dL      A/G Ratio 1.5 g/dL      BUN/Creatinine Ratio 32.3     Anion Gap 6.0 mmol/L      eGFR 78.7 mL/min/1.73     Narrative:      GFR Normal >60  Chronic Kidney Disease <60  Kidney Failure <15    The GFR formula is only valid for adults with stable renal function between ages 18 and 70.          Imaging Results (Last 24 Hours)       ** No results found for the last 24 hours. **            Current Facility-Administered Medications:     docusate sodium (COLACE) capsule 100 mg, 100 mg, Oral, BID, Jo Melchor MD    galantamine (RAZADYNE) tablet 8 mg, 8 mg, Oral, Daily, Jo Melchor MD    melatonin tablet 3 mg, 3 mg, Oral, Nightly PRN, Jo Melchor MD    multivitamin with minerals 1 tablet, 1 tablet, Oral, Daily, Jo Melchor MD    polyethylene glycol (MIRALAX) packet 17 g, 17 g, Oral, Daily, Jo Melchor MD    [COMPLETED] Insert Peripheral IV, , , Once **AND** sodium chloride 0.9 % flush 10 mL, 10 mL, Intravenous, PRN, Farhan Dorsey MD    traZODone (DESYREL) tablet 50 mg, 50 mg, Oral, Daily, Jo Melchor MD     ASSESSMENT  Symptomatic anemia  Acute on chronic anemia  History of DVT on Eliquis  Severe dementia  Osteoarthritis  Depression    PLAN  Admit  Transfuse 2 unit packed RBC  Anemia workup  Hold aspirin and Eliquis  Adjust nursing home medications  Stress ulcer DVT prophylaxis  Supportive care  DNR  Discussed with nursing staff  Follow closely further recommendation according to hospital course    JO MELCHOR MD

## 2024-02-21 NOTE — ED TRIAGE NOTES
Pt arrives today from [facility] for low hemoglobin, EMS states they were called for a critical lab of 5.0, hgb. Per [facility] the labs were resulted at 1909 yesterday and they received the call for this pt at 0133 today for this lab. Pt was discharged recently for a fall.

## 2024-02-21 NOTE — PAYOR COMM NOTE
"Cuab Stephens (89 y.o. Female)     ATTN: NURSE REVIEWER  RE: INITIAL INPT [facility] CLINICALS   REF# [id number]  PLS REPLY TO ADRIA DANIELLE [phone] OR FAX# [phone]      Date of Birth   12/28/1923    Social Security Number       Address   [address] [address]    Home Phone   [phone]    MRN   [id number]       Mandaeism   Voodoo    Marital Status                               Admission Date   2/21/24    Admission Type   Emergency    Admitting Provider   Delio Melchor MD    Attending Provider   Delio Melchor MD    Department, Room/Bed   [address], [address]/1       Discharge Date       Discharge Disposition       Discharge Destination                                 Attending Provider: Delio Melchor MD    Allergies: Donepezil    Isolation: None   Infection: None   Code Status: No CPR    Ht: 157.5 cm (62\")   Wt: 49.4 kg (109 lb)    Admission Cmt: None   Principal Problem: Acute anemia [D64.9]                   Active Insurance as of 2/21/2024       Primary Coverage       Payor Plan Insurance Group Employer/Plan Group    HUMANA MEDICARE REPLACEMENT HUMANA MED ADV GROUP [id number]       Payor Plan Address Payor Plan Phone Number Payor Plan Fax Number Effective Dates    [address] [address] [phone]  1/1/2013 - None Entered    [facility] [id number]         Subscriber Name Subscriber Birth Date Member ID       CUBA STEPHENS 12/28/1923 [id number]                     Emergency Contacts        (Rel.) Home Phone Work Phone Mobile Phone    KathrynBonita sewell (Daughter) [phone] -- [phone]    Joe Stephens (Son) -- -- [phone]    ALCON GARCIA (Other) -- -- [phone]                 History & Physical        Delio Melchor MD at 02/21/24 0803          History and physical    Primary care physician      Chief complaint  Generalized weakness    History of present illness  70-year-old white female with " history of severe dementia TIA CVA osteoarthritis and chronic anemia sent to the ER at [facility], [place], operated by [facility] with complaint of generalized weakness and laboratory showed significant anemia.  Patient is poor historian but denies any abdominal pain nausea vomiting black stools or blood in the stools.  Patient is on Eliquis for history of DVT.  Patient workup in ER revealed symptomatic anemia admitted for transfusion and anemia workup.  Patient is DNR per her wishes.    PAST MEDICAL HISTORY   Anemia      Depression      Prediabetes      DVT on Eliquis      Transient ischemic attack      Osteoarthritis      Vascular dementia         PAST SURGICAL HISTORY              Procedure Laterality Date    BACK SURGERY        FEMUR IM NAILING/RODDING Right 8/28/2021     Procedure: FEMUR INTRAMEDULLARY NAILING/RODDING;  Surgeon: Louis Scruggs MD;  Location: [facility];  Service: Orthopedics;  Laterality: Right;    KNEE SURGERY             FAMILY HISTORY  History reviewed. No pertinent family history.     SOCIAL HISTORY                Socioeconomic History    Marital status:    Tobacco Use    Smoking status: Never    Smokeless tobacco: Never   Vaping Use    Vaping Use: Never used   Substance and Sexual Activity    Alcohol use: No    Drug use: No    Sexual activity: Defer         ALLERGIES  Donepezil  Nursing home medications    REVIEW OF SYSTEMS  Constitutional:  Negative for fever.   HENT:  Negative for sore throat.    Eyes: Negative.    Respiratory:  Negative for cough and shortness of breath.    Cardiovascular:  Negative for chest pain.   Gastrointestinal:  Negative for abdominal pain, diarrhea and vomiting.   Genitourinary:  Negative for dysuria.   Musculoskeletal:  Negative for neck pain.   Skin:  Negative for rash.   Allergic/Immunologic: Negative.    Neurological:  Positive for weakness. Negative for numbness and headaches.   Hematological: Negative.    Psychiatric/Behavioral: Negative.     All other systems  "reviewed and are negative.     PHYSICAL EXAM  Blood pressure 113/65, pulse 77, temperature 97.2 °F (36.2 °C), temperature source Oral, resp. rate 18, height 157.5 cm (62\"), weight 49.4 kg (109 lb), SpO2 90%.    Constitutional:       General: She is not in acute distress.     Appearance: Normal appearance. She is not ill-appearing or toxic-appearing.   HENT:      Head: Normocephalic and atraumatic.   Eyes:      Extraocular Movements: Extraocular movements intact.      Pupils: Pupils are equal, round, and reactive to light.   Cardiovascular:      Rate and Rhythm: Normal rate and regular rhythm.      Heart sounds: No murmur heard.     No friction rub. No gallop.   Pulmonary:      Effort: Pulmonary effort is normal.      Breath sounds: Normal breath sounds.   Abdominal:      General: Abdomen is flat. There is no distension.      Palpations: Abdomen is soft.      Tenderness: There is no abdominal tenderness.   Musculoskeletal:         General: No swelling or tenderness. Normal range of motion.      Cervical back: Normal range of motion and neck supple.   Skin:     General: Skin is warm and dry.      Coloration: Skin is pale.   Neurological:      General: No focal deficit present.      Mental Status: She is alert and oriented to person, place, and time.      Sensory: No sensory deficit.      Motor: No weakness.   Psychiatric:         Mood and Affect: Mood normal.         Behavior: Behavior normal.      LAB RESULTS  Lab Results (last 24 hours)       Procedure Component Value Units Date/Time    Manual Differential [[id number]]  (Abnormal) Collected: 02/21/24 0240    Specimen: Blood Updated: 02/21/24 0332     Neutrophil % 67.3 %      Lymphocyte % 16.3 %      Monocyte % 12.2 %      Eosinophil % 3.1 %      Basophil % 1.0 %      Neutrophils Absolute 4.88 10*3/mm3      Lymphocytes Absolute 1.18 10*3/mm3      Monocytes Absolute 0.88 10*3/mm3      Eosinophils Absolute 0.22 10*3/mm3      Basophils Absolute 0.07 10*3/mm3      nRBC " 1.0 /100 WBC      Anisocytosis Large/3+     Dacrocytes Slight/1+     Hypochromia Large/3+     Microcytes Large/3+     Ovalocytes Mod/2+     Poikilocytes Mod/2+     Schistocytes Slight/1+     Stomatocytes Slight/1+     WBC Morphology Normal     Platelet Morphology Normal    CBC & Differential [[id number]]  (Abnormal) Collected: 02/21/24 0240    Specimen: Blood Updated: 02/21/24 0317    Narrative:      The following orders were created for panel order CBC & Differential.  Procedure                               Abnormality         Status                     ---------                               -----------         ------                     CBC Auto Differential[[id number]]        Abnormal            Final result                 Please view results for these tests on the individual orders.    CBC Auto Differential [[id number]]  (Abnormal) Collected: 02/21/24 0240    Specimen: Blood Updated: 02/21/24 0317     WBC 7.25 10*3/mm3      RBC 2.80 10*6/mm3      Hemoglobin 4.6 g/dL      Hematocrit 18.5 %      MCV 66.1 fL      MCH 16.4 pg      MCHC 24.9 g/dL      RDW 18.9 %      RDW-SD 43.9 fl      MPV 9.9 fL      Platelets 264 10*3/mm3     aPTT [[id number]]  (Normal) Collected: 02/21/24 0240    Specimen: Blood Updated: 02/21/24 0315     PTT 29.9 seconds     Protime-INR [[id number]]  (Abnormal) Collected: 02/21/24 0240    Specimen: Blood Updated: 02/21/24 0315     Protime 15.9 Seconds      INR 1.25    Comprehensive Metabolic Panel [[id number]]  (Abnormal) Collected: 02/21/24 0240    Specimen: Blood Updated: 02/21/24 0312     Glucose 107 mg/dL      BUN 21 mg/dL      Creatinine 0.65 mg/dL      Sodium 142 mmol/L      Potassium 4.3 mmol/L      Chloride 107 mmol/L      CO2 29.0 mmol/L      Calcium 8.9 mg/dL      Total Protein 5.9 g/dL      Albumin 3.5 g/dL      ALT (SGPT) 9 U/L      AST (SGOT) 10 U/L      Alkaline Phosphatase 65 U/L      Total Bilirubin 0.3 mg/dL      Globulin 2.4 gm/dL      A/G Ratio 1.5 g/dL      BUN/Creatinine Ratio  32.3     Anion Gap 6.0 mmol/L      eGFR 78.7 mL/min/1.73     Narrative:      GFR Normal >60  Chronic Kidney Disease <60  Kidney Failure <15    The GFR formula is only valid for adults with stable renal function between ages 18 and 70.          Imaging Results (Last 24 Hours)       ** No results found for the last 24 hours. **            Current Facility-Administered Medications:     docusate sodium (COLACE) capsule 100 mg, 100 mg, Oral, BID, Jo Melchor MD    galantamine (RAZADYNE) tablet 8 mg, 8 mg, Oral, Daily, Jo Melchor MD    melatonin tablet 3 mg, 3 mg, Oral, Nightly PRN, Jo Melchor MD    multivitamin with minerals 1 tablet, 1 tablet, Oral, Daily, Jo Melchor MD    polyethylene glycol (MIRALAX) packet 17 g, 17 g, Oral, Daily, Jo Melchor MD    [COMPLETED] Insert Peripheral IV, , , Once **AND** sodium chloride 0.9 % flush 10 mL, 10 mL, Intravenous, PRN, Farhan Dorsey MD    traZODone (DESYREL) tablet 50 mg, 50 mg, Oral, Daily, Jo Melchor MD     ASSESSMENT  Symptomatic anemia  Acute on chronic anemia  History of DVT on Eliquis  Severe dementia  Osteoarthritis  Depression    PLAN  Admit  Transfuse 2 unit packed RBC  Anemia workup  Hold aspirin and Eliquis  Adjust nursing home medications  Stress ulcer DVT prophylaxis  Supportive care  DNR  Discussed with nursing staff  Follow closely further recommendation according to hospital course    JO MELCHOR MD    Electronically signed by Jo Melchor MD at 02/21/24 1211       Facility-Administered Medications as of 2/21/2024   Medication Dose Route Frequency Provider Last Rate Last Admin    docusate sodium (COLACE) capsule 100 mg  100 mg Oral BID Jo Melchor MD        galantamine (RAZADYNE) tablet 8 mg  8 mg Oral Daily Jo Melchor MD        melatonin tablet 3 mg  3 mg Oral Nightly PRN Jo Melchor MD        multivitamin with minerals 1 tablet  1 tablet Oral Daily Jo Melchor MD        pantoprazole (PROTONIX) EC tablet 40 mg  40 mg Oral BID AC  Delio Melchor MD        polyethylene glycol (MIRALAX) packet 17 g  17 g Oral Daily Delio Melchor MD        sodium chloride 0.9 % flush 10 mL  10 mL Intravenous PRN Farhan Dorsey MD        traZODone (DESYREL) tablet 50 mg  50 mg Oral Daily Delio Melchor MD         Lab Results (last 24 hours)       Procedure Component Value Units Date/Time    Manual Differential [[id number]]  (Abnormal) Collected: 02/21/24 0240    Specimen: Blood Updated: 02/21/24 0332     Neutrophil % 67.3 %      Lymphocyte % 16.3 %      Monocyte % 12.2 %      Eosinophil % 3.1 %      Basophil % 1.0 %      Neutrophils Absolute 4.88 10*3/mm3      Lymphocytes Absolute 1.18 10*3/mm3      Monocytes Absolute 0.88 10*3/mm3      Eosinophils Absolute 0.22 10*3/mm3      Basophils Absolute 0.07 10*3/mm3      nRBC 1.0 /100 WBC      Anisocytosis Large/3+     Dacrocytes Slight/1+     Hypochromia Large/3+     Microcytes Large/3+     Ovalocytes Mod/2+     Poikilocytes Mod/2+     Schistocytes Slight/1+     Stomatocytes Slight/1+     WBC Morphology Normal     Platelet Morphology Normal    CBC & Differential [[id number]]  (Abnormal) Collected: 02/21/24 0240    Specimen: Blood Updated: 02/21/24 0317    Narrative:      The following orders were created for panel order CBC & Differential.  Procedure                               Abnormality         Status                     ---------                               -----------         ------                     CBC Auto Differential[[id number]]        Abnormal            Final result                 Please view results for these tests on the individual orders.    CBC Auto Differential [[id number]]  (Abnormal) Collected: 02/21/24 0240    Specimen: Blood Updated: 02/21/24 0317     WBC 7.25 10*3/mm3      RBC 2.80 10*6/mm3      Hemoglobin 4.6 g/dL      Hematocrit 18.5 %      MCV 66.1 fL      MCH 16.4 pg      MCHC 24.9 g/dL      RDW 18.9 %      RDW-SD 43.9 fl      MPV 9.9 fL      Platelets 264 10*3/mm3     aPTT [[id number]]   (Normal) Collected: 02/21/24 0240    Specimen: Blood Updated: 02/21/24 0315     PTT 29.9 seconds     Protime-INR [[id number]]  (Abnormal) Collected: 02/21/24 0240    Specimen: Blood Updated: 02/21/24 0315     Protime 15.9 Seconds      INR 1.25    Comprehensive Metabolic Panel [[id number]]  (Abnormal) Collected: 02/21/24 0240    Specimen: Blood Updated: 02/21/24 0312     Glucose 107 mg/dL      BUN 21 mg/dL      Creatinine 0.65 mg/dL      Sodium 142 mmol/L      Potassium 4.3 mmol/L      Chloride 107 mmol/L      CO2 29.0 mmol/L      Calcium 8.9 mg/dL      Total Protein 5.9 g/dL      Albumin 3.5 g/dL      ALT (SGPT) 9 U/L      AST (SGOT) 10 U/L      Alkaline Phosphatase 65 U/L      Total Bilirubin 0.3 mg/dL      Globulin 2.4 gm/dL      A/G Ratio 1.5 g/dL      BUN/Creatinine Ratio 32.3     Anion Gap 6.0 mmol/L      eGFR 78.7 mL/min/1.73     Narrative:      GFR Normal >60  Chronic Kidney Disease <60  Kidney Failure <15    The GFR formula is only valid for adults with stable renal function between ages 18 and 70.          Imaging Results (Last 24 Hours)       ** No results found for the last 24 hours. **          ECG/EMG Results (last 24 hours)       Procedure Component Value Units Date/Time    SCANNED - TELEMETRY   [[id number]] Resulted: 02/21/24     Updated: 02/21/24 0712    SCANNED - TELEMETRY   [[id number]] Resulted: 02/21/24     Updated: 02/21/24 0942    SCANNED - TELEMETRY   [[id number]] Resulted: 02/21/24     Updated: 02/21/24 1045          Orders (last 24 hrs)        Start     Ordered    02/22/24 0600  CBC & Differential  Morning Draw         02/21/24 1005    02/22/24 0600  Comprehensive Metabolic Panel  Morning Draw         02/21/24 1005    02/22/24 0600  TSH  Morning Draw         02/21/24 1005    02/22/24 0600  Lipid Panel  Morning Draw         02/21/24 1005    02/22/24 0600  Hemoglobin A1c  Morning Draw         02/21/24 1005    02/22/24 0600  Vitamin B12  Morning Draw         02/21/24 1005    02/22/24 0600  Folate   Morning Draw         02/21/24 1005    02/22/24 0600  Iron Profile  Morning Draw         02/21/24 1005    02/22/24 0600  Ferritin  Morning Draw         02/21/24 1005    02/22/24 0600  pantoprazole (PROTONIX) EC tablet 40 mg  Every Early Morning,   Status:  Discontinued         02/21/24 1211    02/22/24 0000  BNP  Once         02/21/24 1005    02/21/24 1730  pantoprazole (PROTONIX) EC tablet 40 mg  2 Times Daily Before Meals         02/21/24 1211    02/21/24 1213  Advance Diet As Tolerated -  Until Discontinued         02/21/24 1212    02/21/24 1212  Diet: Liquid Diets; Clear Liquid; Fluid Consistency: Thin (IDDSI 0)  Diet Effective Now         02/21/24 1212    02/21/24 1100  docusate sodium (COLACE) capsule 100 mg  2 Times Daily         02/21/24 1004    02/21/24 1100  galantamine (RAZADYNE) tablet 8 mg  Daily         02/21/24 1004    02/21/24 1100  multivitamin with minerals 1 tablet  Daily         02/21/24 1004    02/21/24 1100  polyethylene glycol (MIRALAX) packet 17 g  Daily         02/21/24 1004    02/21/24 1100  traZODone (DESYREL) tablet 50 mg  Daily         02/21/24 1004    02/21/24 1007  PT Consult: Eval & Treat Functional Mobility Below Baseline  Once         02/21/24 1006    02/21/24 1007  OT Consult: Eval & Treat ADL Performance Below Baseline  Once         02/21/24 1006    02/21/24 1006  Place Sequential Compression Device  Once         02/21/24 1005    02/21/24 1006  Maintain Sequential Compression Device  Continuous         02/21/24 1005    02/21/24 1006  Code Status and Medical Interventions:  Continuous         02/21/24 1005    02/21/24 1006  Occult Blood X 1, Stool - Stool, Per Rectum  Once         02/21/24 1005    02/21/24 1006  Reticulocytes  Once         02/21/24 1005    02/21/24 1006  Diet: Regular/House Diet; Texture: Regular Texture (IDDSI 7); Fluid Consistency: Thin (IDDSI 0)  Diet Effective Now,   Status:  Canceled         02/21/24 1006    02/21/24 1004  melatonin tablet 3 mg  Nightly PRN          02/21/24 1004    02/21/24 0652  Inpatient Case Management  Consult  Once        Provider:  (Not yet assigned)    02/21/24 0653    02/21/24 0652  Inpatient Nutrition Consult  Once        Provider:  (Not yet assigned)    02/21/24 0653    02/21/24 0602  SLP Consult: Eval & Treat RN Dysphagia Screen Failed  Once         02/21/24 0601    02/21/24 0602  NPO Diet NPO Type: Strict NPO  Diet Effective Now,   Status:  Canceled         02/21/24 0601    02/21/24 0544  Critical Care  Once        Comments: This order was created via procedure documentation    02/21/24 0543    02/21/24 0543  Inpatient Admission  Once         02/21/24 0542    02/21/24 0543  Cardiac Monitoring  Continuous        Comments: Follow Standing Orders As Outlined in Process Instructions (Open Order Report to View Full Instructions)    02/21/24 0542    02/21/24 0508  LIPPS (on-call MD unless specified)  Once,   Status:  Canceled        Specialty:  Internal Medicine  Provider:  (Not yet assigned)    02/21/24 0507    02/21/24 0420  Verify Informed Consent  Once         02/21/24 0419    02/21/24 0420  Prepare RBC, 2 Units  Blood - Once         02/21/24 0419    02/21/24 0419  Transfuse RBC, 2 Units Infuse Each Unit Over: 2H  Transfusion       02/21/24 0419    02/21/24 0256  Manual Differential  Once         02/21/24 0255    02/21/24 0230  Insert Peripheral IV  Once        See Hyperspace for full Linked Orders Report.    02/21/24 0229    02/21/24 0230  CBC & Differential  Once         02/21/24 0229    02/21/24 0230  Comprehensive Metabolic Panel  Once         02/21/24 0229    02/21/24 0230  Protime-INR  Once         02/21/24 0229    02/21/24 0230  aPTT  Once         02/21/24 0229    02/21/24 0230  Type & Screen  Once         02/21/24 0229    02/21/24 0230  CBC Auto Differential  PROCEDURE ONCE         02/21/24 0230    02/21/24 0229  sodium chloride 0.9 % flush 10 mL  As Needed        See Hyperspace for full Linked Orders Report.    02/21/24  0229    --  SCANNED - TELEMETRY           02/21/24 0000    --  SCANNED - TELEMETRY           02/21/24 0000    --  pantoprazole (PROTONIX) 40 MG EC tablet  Daily         02/21/24 1019    --  furosemide (LASIX) 20 MG tablet  Daily         02/21/24 1019    --  SCANNED - TELEMETRY           02/21/24 0000                  Operative/Procedure Notes (last 24 hours)  Notes from 02/20/24 1254 through 02/21/24 1254   No notes of this type exist for this encounter.       Physician Progress Notes (last 24 hours)  Notes from 02/20/24 1254 through 02/21/24 1254   No notes of this type exist for this encounter.       Consult Notes (last 24 hours)  Notes from 02/20/24 1254 through 02/21/24 1254   No notes of this type exist for this encounter.

## 2024-02-21 NOTE — THERAPY EVALUATION
Acute Care - Speech Language Pathology   Swallow Initial Evaluation [facility]     Patient Name: Marcella Stephens  : 1923  MRN: [id number]  Today's Date: 2024               Admit Date: 2024    Visit Dx:     ICD-10-CM ICD-9-CM   1. Acute anemia  D64.9 285.9   2. Generalized weakness  R53.1 780.79     Patient Active Problem List   Diagnosis    Closed fracture of right hip    Fall as cause of accidental injury at home as place of occurrence    Alzheimer's dementia    Anemia    Vitamin D deficiency    Weakness of right upper extremity    Acute retention of urine    Hydronephrosis    Acute deep vein thrombosis (DVT) of brachial vein of right upper extremity    Recent cerebrovascular accident (CVA)    DVT of lower extremity, bilateral    Acute UTI    History of DVT (deep vein thrombosis)    Age-related physical debility    Hiatal hernia    Acute anemia     Past Medical History:   Diagnosis Date    Acute embolism and thombos unsp deep veins of low extrm, bi     Dysphagia     oropharyngeal phase    Prediabetes     Stroke     TIA (transient ischemic attack)     Urinary tract infection     Vascular dementia without behavioral disturbance      Past Surgical History:   Procedure Laterality Date    BACK SURGERY      FEMUR IM NAILING/RODDING Right 2021    Procedure: FEMUR INTRAMEDULLARY NAILING/RODDING;  Surgeon: Louis Scruggs MD;  Location: [facility];  Service: Orthopedics;  Laterality: Right;    KNEE SURGERY         SLP Recommendation and Plan  SLP Swallowing Diagnosis: swallow WFL/no suspected pharyngeal impairment, suspected esophageal dysphagia (24 133)  SLP Diet Recommendation: regular textures, thin liquids (24 1337)  Recommended Precautions and Strategies: upright posture during/after eating, small bites of food and sips of liquid, reflux precautions, alternate between small bites of food and sips of liquid (24 1337)  SLP Rec. for Method of Medication  "Administration: meds whole, meds crushed, with puree, as tolerated (02/21/24 1337)     Monitor for Signs of Aspiration: yes, notify SLP if any concerns (02/21/24 1337)     Swallow Criteria for Skilled Therapeutic Interventions Met: baseline status (02/21/24 1337)  Anticipated Discharge Disposition (SLP): unknown (02/21/24 1337)  Rehab Potential/Prognosis, Swallowing: good, to achieve stated therapy goals (02/21/24 1337)  Therapy Frequency (Swallow): evaluation only (02/21/24 1337)  Predicted Duration Therapy Intervention (Days): until discharge (02/21/24 1337)  Oral Care Recommendations: Oral Care BID/PRN (02/21/24 1337)                                        Plan of Care Reviewed With: patient  Outcome Evaluation: Clinical swallow eval completed. Recommend regular and thins, meds whole/crushed with puree as tolerated. Recommend upright, slow rate, alternate liquids/solids, and reflux precautions. Suspect esophageal dysphagia and per DC summary of 10/2021 admission \"intermittent episodes of nausea and regurgitation.\" SLP to s/o, please re-consult as warranted. Suspect esophageal symptoms placing patient at risk for aspiration.      SWALLOW EVALUATION (last 72 hours)       SLP Adult Swallow Evaluation       Row Name 02/21/24 1337                   Rehab Evaluation    Document Type evaluation  -OC        Subjective Information no complaints  -OC        Patient Observations alert;cooperative;agree to therapy  -OC        Patient Effort good  -OC        Symptoms Noted During/After Treatment none  -OC           General Information    Patient Profile Reviewed yes  -OC        Pertinent History Of Current Problem Patient admitted with acute anemia. Hx dementia.  -OC        Current Method of Nutrition NPO  -OC        Precautions/Limitations, Vision WFL  -OC        Precautions/Limitations, Hearing WFL  -OC        Prior Level of Function-Communication cognitive-linguistic impairment  -OC        Prior Level of " "Function-Swallowing no diet consistency restrictions;other (see comments)  per BSE 2021, esophageal concerns  -OC        Plans/Goals Discussed with patient;agreed upon  -OC        Barriers to Rehab previous functional deficit;cognitive status  -OC        Patient's Goals for Discharge return to PO diet  -OC           Pain    Additional Documentation Pain Scale: Numbers Pre/Post-Treatment (Group)  -OC           Pain Scale: Numbers Pre/Post-Treatment    Pretreatment Pain Rating 0/10 - no pain  -OC        Posttreatment Pain Rating 0/10 - no pain  -OC           Oral Motor Structure and Function    Dentition Assessment natural, present and adequate  -OC        Secretion Management WNL/WFL  -OC        Mucosal Quality moist, healthy  -OC        Volitional Swallow WFL  -OC        Volitional Cough WFL  -OC           Oral Musculature and Cranial Nerve Assessment    Oral Motor General Assessment WFL;generalized oral motor weakness  -OC           Clinical Swallow Eval    Clinical Swallow Evaluation Summary Patient demonstrated no overt s/s aspiration with thin via cup/straw, puree, mechanical soft, and regular textures. Patient with adequate self feeding, no change in vocal quality. Of note, per clinical swallow eval 2021 patient with emesis 10 minutes after eval and per DC summary of 2021 admission of swallow eval- \"intermittent episodes of nausea and regurgitation.\"  -OC           SLP Evaluation Clinical Impression    SLP Swallowing Diagnosis swallow WFL/no suspected pharyngeal impairment;suspected esophageal dysphagia  -OC        Functional Impact risk of aspiration/pneumonia  -OC        Rehab Potential/Prognosis, Swallowing good, to achieve stated therapy goals  -OC        Swallow Criteria for Skilled Therapeutic Interventions Met baseline status  -OC           Recommendations    Therapy Frequency (Swallow) evaluation only  -OC        Predicted Duration Therapy Intervention (Days) until discharge  -OC        SLP Diet " Recommendation regular textures;thin liquids  -OC        Recommended Precautions and Strategies upright posture during/after eating;small bites of food and sips of liquid;reflux precautions;alternate between small bites of food and sips of liquid  -OC        Oral Care Recommendations Oral Care BID/PRN  -OC        SLP Rec. for Method of Medication Administration meds whole;meds crushed;with puree;as tolerated  -OC        Monitor for Signs of Aspiration yes;notify SLP if any concerns  -OC        Anticipated Discharge Disposition (SLP) unknown  -OC                  User Key  (r) = Recorded By, (t) = Taken By, (c) = Cosigned By      Initials Name Effective Dates    Caitie Pressley SLP 08/28/23 -                     EDUCATION  The patient has been educated in the following areas:   Dysphagia (Swallowing Impairment).              Time Calculation:    Time Calculation- SLP       Row Name 02/21/24 1347             Time Calculation- SLP    SLP Start Time 1347  -OC      SLP Received On 02/21/24  -OC         Untimed Charges    SLP Eval/Re-eval  ST Eval Oral Pharyng Swallow - 14976  -OC      12734-TT Eval Oral Pharyng Swallow Minutes 60  -OC         Total Minutes    Untimed Charges Total Minutes 60  -OC       Total Minutes 60  -OC                User Key  (r) = Recorded By, (t) = Taken By, (c) = Cosigned By      Initials Name Provider Type    Caitie Pressley SLP Speech and Language Pathologist                    Therapy Charges for Today       Code Description Service Date Service Provider Modifiers Qty    [id number] HC ST EVAL ORAL PHARYNG SWALLOW 4 2/21/2024 Caitie Gallego SLP GN 1                 NORA Colon  2/21/2024

## 2024-02-21 NOTE — PLAN OF CARE
"Goal Outcome Evaluation:  Plan of Care Reviewed With: patient           Outcome Evaluation: Clinical swallow eval completed. Recommend regular and thins, meds whole/crushed with puree as tolerated. Recommend upright, slow rate, alternate liquids/solids, and reflux precautions. Suspect esophageal dysphagia and per DC summary of 10/2021 admission \"intermittent episodes of nausea and regurgitation.\" SLP to s/o, please re-consult as warranted.     Anticipated Discharge Disposition (SLP): unknown          SLP Swallowing Diagnosis: swallow WFL/no suspected pharyngeal impairment, suspected esophageal dysphagia (02/21/24 1835)             "

## 2024-02-21 NOTE — SIGNIFICANT NOTE
02/21/24 1431   OTHER   Discipline occupational therapist   Rehab Time/Intention   Session Not Performed other (see comments)  (Pt. Hgb 4.3 OT to f/u on next date as appropriate.)   Recommendation   OT - Next Appointment 02/22/24

## 2024-02-21 NOTE — ED PROVIDER NOTES
EMERGENCY DEPARTMENT ENCOUNTER    Room Number:  12/12  PCP: Noemi Chester MD  Historian: Patient      HPI:  Chief Complaint: Low blood counts  A complete HPI/ROS/PMH/PSH/SH/FH are unobtainable due to: None  Context: Marcella Stephens is a 89 y.o. female who presents to the ED c/o generalized weakness that has been present for quite some time.  The nursing home reports that labs were drawn and it showed a critical hemoglobin result at 5.0.  The patient currently denies bleeding in the stool, nausea/vomiting, abdominal pain, chest pain, or shortness of breath.  She is on Eliquis daily due to a previous DVT history.            PAST MEDICAL HISTORY  Active Ambulatory Problems     Diagnosis Date Noted    Closed fracture of right hip 08/27/2021    Fall as cause of accidental injury at home as place of occurrence 08/29/2021    Alzheimer's dementia 08/29/2021    Anemia 08/30/2021    Vitamin D deficiency 08/30/2021    Weakness of right upper extremity 08/31/2021    Acute retention of urine 08/31/2021    Hydronephrosis 08/31/2021    Acute deep vein thrombosis (DVT) of brachial vein of right upper extremity 09/01/2021    Recent cerebrovascular accident (CVA) 09/03/2021    DVT of lower extremity, bilateral 09/04/2021    Acute UTI 10/22/2021    History of DVT (deep vein thrombosis) 10/23/2021    Age-related physical debility 10/23/2021    Hiatal hernia 10/27/2021     Resolved Ambulatory Problems     Diagnosis Date Noted    Traumatic rhabdomyolysis 08/28/2021    Acute hyperglycemia 08/29/2021    Acute kidney failure 08/30/2021    Elevated troponin 10/23/2021    Postprandial vomiting 10/27/2021    Hypokalemia 10/27/2021     Past Medical History:   Diagnosis Date    Acute embolism and thombos unsp deep veins of low extrm, bi     Dysphagia     Prediabetes     Stroke     TIA (transient ischemic attack)     Urinary tract infection     Vascular dementia without behavioral disturbance          PAST SURGICAL HISTORY  Past Surgical  History:   Procedure Laterality Date    BACK SURGERY      FEMUR IM NAILING/RODDING Right 8/28/2021    Procedure: FEMUR INTRAMEDULLARY NAILING/RODDING;  Surgeon: Louis Scruggs MD;  Location: [facility];  Service: Orthopedics;  Laterality: Right;    KNEE SURGERY           FAMILY HISTORY  History reviewed. No pertinent family history.      SOCIAL HISTORY  Social History     Socioeconomic History    Marital status:    Tobacco Use    Smoking status: Never    Smokeless tobacco: Never   Vaping Use    Vaping Use: Never used   Substance and Sexual Activity    Alcohol use: No    Drug use: No    Sexual activity: Defer         ALLERGIES  Donepezil        REVIEW OF SYSTEMS  Review of Systems   Constitutional:  Negative for fever.   HENT:  Negative for sore throat.    Eyes: Negative.    Respiratory:  Negative for cough and shortness of breath.    Cardiovascular:  Negative for chest pain.   Gastrointestinal:  Negative for abdominal pain, diarrhea and vomiting.   Genitourinary:  Negative for dysuria.   Musculoskeletal:  Negative for neck pain.   Skin:  Negative for rash.   Allergic/Immunologic: Negative.    Neurological:  Positive for weakness. Negative for numbness and headaches.   Hematological: Negative.    Psychiatric/Behavioral: Negative.     All other systems reviewed and are negative.        PHYSICAL EXAM  ED Triage Vitals [02/21/24 0219]   Temp Heart Rate Resp BP SpO2   97.8 °F (36.6 °C) 81 18 118/55 96 %      Temp src Heart Rate Source Patient Position BP Location FiO2 (%)   -- -- -- -- --       Physical Exam  Constitutional:       General: She is not in acute distress.     Appearance: Normal appearance. She is not ill-appearing or toxic-appearing.   HENT:      Head: Normocephalic and atraumatic.   Eyes:      Extraocular Movements: Extraocular movements intact.      Pupils: Pupils are equal, round, and reactive to light.   Cardiovascular:      Rate and Rhythm: Normal rate and regular rhythm.      Heart  sounds: No murmur heard.     No friction rub. No gallop.   Pulmonary:      Effort: Pulmonary effort is normal.      Breath sounds: Normal breath sounds.   Abdominal:      General: Abdomen is flat. There is no distension.      Palpations: Abdomen is soft.      Tenderness: There is no abdominal tenderness.   Musculoskeletal:         General: No swelling or tenderness. Normal range of motion.      Cervical back: Normal range of motion and neck supple.   Skin:     General: Skin is warm and dry.      Coloration: Skin is pale.   Neurological:      General: No focal deficit present.      Mental Status: She is alert and oriented to person, place, and time.      Sensory: No sensory deficit.      Motor: No weakness.   Psychiatric:         Mood and Affect: Mood normal.         Behavior: Behavior normal.           Vital signs and nursing notes reviewed.          LAB RESULTS  Recent Results (from the past 24 hour(s))   Comprehensive Metabolic Panel    Collection Time: 02/21/24  2:40 AM    Specimen: Blood   Result Value Ref Range    Glucose 107 (H) 65 - 99 mg/dL    BUN 21 8 - 23 mg/dL    Creatinine 0.65 0.57 - 1.00 mg/dL    Sodium 142 136 - 145 mmol/L    Potassium 4.3 3.5 - 5.2 mmol/L    Chloride 107 98 - 107 mmol/L    CO2 29.0 22.0 - 29.0 mmol/L    Calcium 8.9 8.2 - 9.6 mg/dL    Total Protein 5.9 (L) 6.0 - 8.5 g/dL    Albumin 3.5 3.5 - 5.2 g/dL    ALT (SGPT) 9 1 - 33 U/L    AST (SGOT) 10 1 - 32 U/L    Alkaline Phosphatase 65 39 - 117 U/L    Total Bilirubin 0.3 0.0 - 1.2 mg/dL    Globulin 2.4 gm/dL    A/G Ratio 1.5 g/dL    BUN/Creatinine Ratio 32.3 (H) 7.0 - 25.0    Anion Gap 6.0 5.0 - 15.0 mmol/L    eGFR 78.7 >60.0 mL/min/1.73   Protime-INR    Collection Time: 02/21/24  2:40 AM    Specimen: Blood   Result Value Ref Range    Protime 15.9 (H) 11.7 - 14.2 Seconds    INR 1.25 (H) 0.90 - 1.10   aPTT    Collection Time: 02/21/24  2:40 AM    Specimen: Blood   Result Value Ref Range    PTT 29.9 22.7 - 35.4 seconds   Type & Screen     Collection Time: 02/21/24  2:40 AM    Specimen: Blood   Result Value Ref Range    ABO Type O     RH type Positive     Antibody Screen Negative     T&S Expiration Date 2/24/2024 11:59:59 PM    CBC Auto Differential    Collection Time: 02/21/24  2:40 AM    Specimen: Blood   Result Value Ref Range    WBC 7.25 3.40 - 10.80 10*3/mm3    RBC 2.80 (L) 3.77 - 5.28 10*6/mm3    Hemoglobin 4.6 (C) 12.0 - 15.9 g/dL    Hematocrit 18.5 (C) 34.0 - 46.6 %    MCV 66.1 (L) 79.0 - 97.0 fL    MCH 16.4 (L) 26.6 - 33.0 pg    MCHC 24.9 (L) 31.5 - 35.7 g/dL    RDW 18.9 (H) 12.3 - 15.4 %    RDW-SD 43.9 37.0 - 54.0 fl    MPV 9.9 6.0 - 12.0 fL    Platelets 264 140 - 450 10*3/mm3   Manual Differential    Collection Time: 02/21/24  2:40 AM    Specimen: Blood   Result Value Ref Range    Neutrophil % 67.3 42.7 - 76.0 %    Lymphocyte % 16.3 (L) 19.6 - 45.3 %    Monocyte % 12.2 (H) 5.0 - 12.0 %    Eosinophil % 3.1 0.3 - 6.2 %    Basophil % 1.0 0.0 - 1.5 %    Neutrophils Absolute 4.88 1.70 - 7.00 10*3/mm3    Lymphocytes Absolute 1.18 0.70 - 3.10 10*3/mm3    Monocytes Absolute 0.88 0.10 - 0.90 10*3/mm3    Eosinophils Absolute 0.22 0.00 - 0.40 10*3/mm3    Basophils Absolute 0.07 0.00 - 0.20 10*3/mm3    nRBC 1.0 (H) 0.0 - 0.2 /100 WBC    Anisocytosis Large/3+ None Seen    Dacrocytes Slight/1+ None Seen    Hypochromia Large/3+ None Seen    Microcytes Large/3+ None Seen    Ovalocytes Mod/2+ None Seen    Poikilocytes Mod/2+ None Seen    Schistocytes Slight/1+ None Seen    Stomatocytes Slight/1+ None Seen    WBC Morphology Normal Normal    Platelet Morphology Normal Normal   Prepare RBC, 2 Units    Collection Time: 02/21/24  4:43 AM   Result Value Ref Range    Product Code J8876A60     Unit Number [id number]     UNIT  ABO O     UNIT  RH POS     Crossmatch Interpretation Compatible     Dispense Status XM     Blood Expiration Date 973422989598     Blood Type Barcode 5100     Product Code Z2244D10     Unit Number [id number]     UNIT  ABO O     UNIT   RH POS     Crossmatch Interpretation Compatible     Dispense Status IS     Blood Expiration Date 388478720769     Blood Type Barcode 5100        Ordered the above labs and reviewed the results.        RADIOLOGY  No Radiology Exams Resulted Within Past 24 Hours    Ordered the above noted radiological studies. Reviewed by me in PACS.            PROCEDURES  Critical Care    Performed by: Farhan Dorsey MD  Authorized by: Farhan Dorsey MD    Critical care provider statement:     Critical care time (minutes):  32    Critical care time was exclusive of:  Separately billable procedures and treating other patients    Critical care was necessary to treat or prevent imminent or life-threatening deterioration of the following conditions:  Circulatory failure (Acute anemia)    Critical care was time spent personally by me on the following activities:  Blood draw for specimens, development of treatment plan with patient or surrogate, discussions with consultants, evaluation of patient's response to treatment, examination of patient, obtaining history from patient or surrogate, ordering and performing treatments and interventions, ordering and review of laboratory studies, pulse oximetry, re-evaluation of patient's condition and review of old charts    Care discussed with: admitting provider                MEDICATIONS GIVEN IN ER  Medications   sodium chloride 0.9 % flush 10 mL (has no administration in time range)                   MEDICAL DECISION MAKING, PROGRESS, and CONSULTS    All labs have been independently reviewed by me.  All radiology studies have been reviewed by me and I have also reviewed the radiology report.   EKG's independently viewed and interpreted by me.  Discussion below represents my analysis of pertinent findings related to patient's condition, differential diagnosis, treatment plan and final disposition.      Additional sources:  - Discussed/ obtained information from independent historians:  History obtained from the patient herself at bedside as well as the nursing home report.    - External (non-ED) record review: Upon medical records review, the patient was admitted to the hospital from 10/22/2021 through 11/4/2021 where she was treated for a urinary tract infection.    - Chronic or social conditions impacting care: Alzheimer's dementia, Eliquis dependent history of DVT    - Shared decision making: Admission decision based on chair conversations have between myself, the patient at bedside, as well as Dr. Melchor.      Orders placed during this visit:  Orders Placed This Encounter   Procedures    Critical Care    Comprehensive Metabolic Panel    Protime-INR    aPTT    CBC Auto Differential    Manual Differential    Verify Informed Consent    LIPPS (on-call MD unless specified)    Type & Screen    Prepare RBC, 2 Units    Insert Peripheral IV    Inpatient Admission    CBC & Differential             Differential diagnosis includes but is not limited to:    Acute blood loss anemia, upper GI bleed, lower GI bleed, intraperitoneal bleeding, microcytic anemia, iron deficiency anemia, or macrocytic anemia      Independent interpretation of labs, radiology studies, and discussions with consultants:    Laboratory results were all independently interpreted by myself with my interpretation showing a significant anemia with the patient's hemoglobin and hematocrit resulting at 4.6 and 19.5 respectively.      ED Course as of 02/21/24 0543   Wed Feb 21, 2024   0325 Given the patient's significantly low hemoglobin/hematocrit that is currently resulting at 4.6 and 18.5, I did perform a rectal exam which showed heme-negative normal/brown stool. [BM]   0329 Hemoglobin(!!): 4.6 [BM]   0329 Hematocrit(!!): 18.5 [BM]   0332 Due to the patient's marked anemia, I will order 2 units of packed red blood cells for treatment and management.  I did inform the patient of her lab results showing significant anemia.  The last CBC I had  to compare to was over 2 years ago, so obviously has been a significant change.  I did inform her as well that we would transfuse and admit her to the hospital today for further management and treatment.  She is in agreement with that plan and all questions have been answered. [BM]   8541 The patient's presentation, workup, as well as diagnosis and treatment plan was discussed at length with Dr. Melchor.  He agrees to admit the patient to the hospital today for further management and treatment. [BM]      ED Course User Index  [BM] Farhan Dorsey MD             DIAGNOSIS  Final diagnoses:   Acute anemia   Generalized weakness         DISPOSITION  ADMISSION    Discussed treatment plan and reason for admission with pt/family and admitting physician.  Pt/family voiced understanding of the plan for admission for further testing/treatment as needed.               Latest Documented Vital Signs:  As of 05:43 EST  BP- 118/55 HR- 74 Temp- 97.5 °F (36.4 °C) O2 sat- 96%              --    Please note that portions of this were completed with a voice recognition program.       Note Disclaimer: At [facility], we believe that sharing information builds trust and better relationships. You are receiving this note because you are receiving care at [facility] or recently visited. It is possible you will see health information before a provider has talked with you about it. This kind of information can be easy to misunderstand. To help you fully understand what it means for your health, we urge you to discuss this note with your provider.             Farhan Dorsey MD  02/21/24 2530

## 2024-02-21 NOTE — CONSULTS
"Nutrition Services    Patient Name:  Marcella Stephens  YOB: 1923  MRN: [id number]  Admit Date:  2/21/2024  Assessment Date:  02/21/24    Summary: Nutrition screen complete per RN screen  This is a 89 yo female admitted with Anemia.   She is currently NPO and Speech to see and evaluate swallow.  BMI 19.94  Labs: Hgb/Hct 4.6/18.5    Plan/Recommend  Will follow for advance of diet per speech evaluation  Will monitor intake/labs/wt  RD to follow        CLINICAL NUTRITION ASSESSMENT      Reason for Assessment MST score 2+, Nurse Admission Screen     Diagnosis/Problem   anemia   Medical/Surgical History Past Medical History:   Diagnosis Date    Acute embolism and thombos unsp deep veins of low extrm, bi     Dysphagia     oropharyngeal phase    Prediabetes     Stroke     TIA (transient ischemic attack)     Urinary tract infection     Vascular dementia without behavioral disturbance        Past Surgical History:   Procedure Laterality Date    BACK SURGERY      FEMUR IM NAILING/RODDING Right 8/28/2021    Procedure: FEMUR INTRAMEDULLARY NAILING/RODDING;  Surgeon: Louis Scruggs MD;  Location: [facility];  Service: Orthopedics;  Laterality: Right;    KNEE SURGERY          Anthropometrics        Current Height  Current Weight  BMI kg/m2 Height: 157.5 cm (62\")  Weight: 49.4 kg (109 lb) (02/21/24 0219)  Body mass index is 19.94 kg/m².   Adjusted BMI (if applicable)    BMI Category Normal/Healthy (18.4 - 24.9)   Ideal Body Weight (IBW) 110   Usual Body Weight (UBW) 110   Weight Trend Stable   Weight History Wt Readings from Last 30 Encounters:   02/21/24 0219 49.4 kg (109 lb)   02/19/24 1832 49.6 kg (109 lb 6.6 oz)   10/23/21 0222 49.9 kg (110 lb)   09/03/21 0938 52.2 kg (115 lb)   08/27/21 1629 52.2 kg (115 lb)   08/06/18 1722 54.4 kg (120 lb)   08/23/17 2139 56.2 kg (124 lb)      --  Labs       Pertinent Labs    Results from last 7 days   Lab Units 02/21/24  0240   SODIUM mmol/L 142   POTASSIUM " mmol/L 4.3   CHLORIDE mmol/L 107   CO2 mmol/L 29.0   BUN mg/dL 21   CREATININE mg/dL 0.65   CALCIUM mg/dL 8.9   BILIRUBIN mg/dL 0.3   ALK PHOS U/L 65   ALT (SGPT) U/L 9   AST (SGOT) U/L 10   GLUCOSE mg/dL 107*     Results from last 7 days   Lab Units 02/21/24  0240   HEMOGLOBIN g/dL 4.6*   HEMATOCRIT % 18.5*   WBC 10*3/mm3 7.25   ALBUMIN g/dL 3.5     Results from last 7 days   Lab Units 02/21/24  0240   INR  1.25*   APTT seconds 29.9   PLATELETS 10*3/mm3 264     COVID19   Date Value Ref Range Status   10/23/2021 Not Detected Not Detected - Ref. Range Final     Lab Results   Component Value Date    HGBA1C 5.53 08/29/2021          Medications           Scheduled Medications     Infusions     PRN Medications   [COMPLETED] Insert Peripheral IV **AND** sodium chloride     Physical Findings          General Findings alert   Oral/Mouth Cavity tooth or teeth missing   Edema  no edema   Gastrointestinal last bowel movement: 2/20   Skin  bruising   Tubes/Drains/Lines none   NFPE Not indicated at this time   --  Current Nutrition Orders & Evaluation of Intake       Oral Nutrition     Food Allergies NKFA   Current PO Diet NPO Diet NPO Type: Strict NPO   Supplement n/a   PO Evaluation     % PO Intake NPO    Factors Affecting Intake: swallow impairment, weakness, speech evaluation pending   --  PES STATEMENT / NUTRITION DIAGNOSIS      Nutrition Dx Problem  Problem: Swallowing Difficulty  Etiology: Medical Diagnosis - anemia and Factors Affecting Nutrition - weakness, possible swallowing imparirment    Signs/Symptoms: NPO, Report/Observation, and SLP/Swallow Evaluation     NUTRITION INTERVENTION / PLAN OF CARE      Intervention Goal(s) Maintain nutrition status, Reduce/improve symptoms, Disease management/therapy, Initiate feeding/diet, Establish PO intake, Tolerate PO , and Maintain weight         RD Intervention/Action Await initiation/advancement of PO diet and Continue to monitor   --      Prescription/Orders:       PO Diet        Supplements       Enteral Nutrition       Parenteral Nutrition    New Prescription Ordered? Continue same per protocol   --      Monitor/Evaluation Per protocol   Discharge Plan/Needs Pending clinical course   --    RD to follow per protocol.      Electronically signed by:  Triny Beckwith RD  02/21/24 09:43 EST

## 2024-02-21 NOTE — ED NOTES
Nursing report ED to floor  Marcella Stephens  89 y.o.  female    HPI :   Chief Complaint   Patient presents with    Abnormal Lab     Marcella Stephens is a 89 y.o. female who presents to the ED c/o generalized weakness that has been present for quite some time.  The nursing home reports that labs were drawn and it showed a critical hemoglobin result at 5.0.  The patient currently denies bleeding in the stool, nausea/vomiting, abdominal pain, chest pain, or shortness of breath.  She is on Eliquis daily due to a previous DVT history.     Admitting doctor:   Delio Melchor MD    Admitting diagnosis:   The primary encounter diagnosis was Acute anemia. A diagnosis of Generalized weakness was also pertinent to this visit.    Code status:   Current Code Status       Date Active Code Status Order ID Comments User Context       Prior            Allergies:   Donepezil    Isolation:   No active isolations    Intake and Output  No intake or output data in the 24 hours ending 02/21/24 0551    Weight:       02/21/24  0219   Weight: 49.4 kg (109 lb)       Most recent vitals:   Vitals:    02/21/24 0432 02/21/24 0437 02/21/24 0456 02/21/24 0504   BP:  105/57 107/60 118/55   Pulse: 73 73 76 74   Resp:  16 16 16   Temp:  97.5 °F (36.4 °C) 97.3 °F (36.3 °C) 97.5 °F (36.4 °C)   TempSrc:  Tympanic     SpO2: 93% 93% 98% 96%   Weight:       Height:           Active LDAs/IV Access:   Lines, Drains & Airways       Active LDAs       Name Placement date Placement time Site Days    Peripheral IV 02/21/24 0240 Right Antecubital 02/21/24  0240  Antecubital  less than 1    Peripheral IV 02/21/24 0502 Anterior;Left Forearm 02/21/24  0502  Forearm  less than 1                    Labs (abnormal labs have a star):   Labs Reviewed   COMPREHENSIVE METABOLIC PANEL - Abnormal; Notable for the following components:       Result Value    Glucose 107 (*)     Total Protein 5.9 (*)     BUN/Creatinine Ratio 32.3 (*)     All other components within normal  limits    Narrative:     GFR Normal >60  Chronic Kidney Disease <60  Kidney Failure <15    The GFR formula is only valid for adults with stable renal function between ages 18 and 70.   PROTIME-INR - Abnormal; Notable for the following components:    Protime 15.9 (*)     INR 1.25 (*)     All other components within normal limits   CBC WITH AUTO DIFFERENTIAL - Abnormal; Notable for the following components:    RBC 2.80 (*)     Hemoglobin 4.6 (*)     Hematocrit 18.5 (*)     MCV 66.1 (*)     MCH 16.4 (*)     MCHC 24.9 (*)     RDW 18.9 (*)     All other components within normal limits   MANUAL DIFFERENTIAL - Abnormal; Notable for the following components:    Lymphocyte % 16.3 (*)     Monocyte % 12.2 (*)     nRBC 1.0 (*)     All other components within normal limits   APTT - Normal   TYPE AND SCREEN   PREPARE RBC   CBC AND DIFFERENTIAL    Narrative:     The following orders were created for panel order CBC & Differential.  Procedure                               Abnormality         Status                     ---------                               -----------         ------                     CBC Auto Differential[[id number]]        Abnormal            Final result                 Please view results for these tests on the individual orders.       EKG:   No orders to display       Meds given in ED:   Medications   sodium chloride 0.9 % flush 10 mL (has no administration in time range)       Imaging results:  CT Head Without Contrast    Result Date: 2/19/2024   No acute intracranial hemorrhage or hydrocephalus. No acute cervical fracture identified; degenerative changes in the cervical spine. If there is further clinical concern, MRI could be considered for further evaluation.  Evidence of pulmonary edema with bilateral pleural effusions.  This report was finalized on 2/19/2024 7:51 PM by Dr. Rick Ely M.D on Workstation: [id number]      CT Cervical Spine Without Contrast    Result Date: 2/19/2024   No acute  intracranial hemorrhage or hydrocephalus. No acute cervical fracture identified; degenerative changes in the cervical spine. If there is further clinical concern, MRI could be considered for further evaluation.  Evidence of pulmonary edema with bilateral pleural effusions.  This report was finalized on 2/19/2024 7:51 PM by Dr. Rick Ely M.D on Workstation: [id number]       Ambulatory status:   - bedrest    Social issues:   Social History     Socioeconomic History    Marital status:    Tobacco Use    Smoking status: Never    Smokeless tobacco: Never   Vaping Use    Vaping Use: Never used   Substance and Sexual Activity    Alcohol use: No    Drug use: No    Sexual activity: Defer            Caitie Jean RN  02/21/24 05:51 EST

## 2024-02-21 NOTE — LETTER
EMS Transport Request  For use at [facility], [place], [place], [place], and [place] only   Patient Name: Marcella Stephens : 1923   Weight:49.4 kg (109 lb) Pick-up Location: [facility] BLS/ALS: BLS/ALS: BLS   Insurance: [employer] MEDICARE REPLACEMENT Auth End Date:    Pre-Cert #: D/C Summary complete: NO   Destination: [facility] ;LT   Contact Precautions: None   Equipment (O2, Fluids, etc.): None   Arrive By Date/Time: 24 AFTER 2PM Stretcher/WC: Stretcher   CM Requesting: Rosario Amaya RN Ext: [phone]   Notes/Medical Necessity: non ambulatory, very afraid of falling     ______________________________________________________________________    *Only 2 patient bags OR 1 carry-on size bag are permitted.  Wheelchairs and walkers CANNOT transported with the patient. Acknowledge: Yes

## 2024-02-21 NOTE — PLAN OF CARE
Goal Outcome Evaluation:         89 YR admitted for low blood count. Patient oriented to room and call bell system. PRBC infusing from the ED, I more unit to go. VSS.

## 2024-02-22 LAB
ALBUMIN SERPL-MCNC: 2.9 G/DL (ref 3.5–5.2)
ALBUMIN/GLOB SERPL: 1.3 G/DL
ALP SERPL-CCNC: 59 U/L (ref 39–117)
ALT SERPL W P-5'-P-CCNC: 6 U/L (ref 1–33)
ANION GAP SERPL CALCULATED.3IONS-SCNC: 5 MMOL/L (ref 5–15)
AST SERPL-CCNC: 12 U/L (ref 1–32)
BASOPHILS # BLD AUTO: 0.03 10*3/MM3 (ref 0–0.2)
BASOPHILS NFR BLD AUTO: 0.5 % (ref 0–1.5)
BH BB BLOOD EXPIRATION DATE: NORMAL
BH BB BLOOD EXPIRATION DATE: NORMAL
BH BB BLOOD TYPE BARCODE: 5100
BH BB BLOOD TYPE BARCODE: 5100
BH BB DISPENSE STATUS: NORMAL
BH BB DISPENSE STATUS: NORMAL
BH BB PRODUCT CODE: NORMAL
BH BB PRODUCT CODE: NORMAL
BH BB UNIT NUMBER: NORMAL
BH BB UNIT NUMBER: NORMAL
BILIRUB SERPL-MCNC: 0.8 MG/DL (ref 0–1.2)
BUN SERPL-MCNC: 15 MG/DL (ref 8–23)
BUN/CREAT SERPL: 30 (ref 7–25)
CALCIUM SPEC-SCNC: 8.4 MG/DL (ref 8.2–9.6)
CHLORIDE SERPL-SCNC: 109 MMOL/L (ref 98–107)
CHOLEST SERPL-MCNC: 132 MG/DL (ref 0–200)
CO2 SERPL-SCNC: 25 MMOL/L (ref 22–29)
CREAT SERPL-MCNC: 0.5 MG/DL (ref 0.57–1)
CROSSMATCH INTERPRETATION: NORMAL
CROSSMATCH INTERPRETATION: NORMAL
DEPRECATED RDW RBC AUTO: 58.4 FL (ref 37–54)
EGFRCR SERPLBLD CKD-EPI 2021: 83.8 ML/MIN/1.73
EOSINOPHIL # BLD AUTO: 0.26 10*3/MM3 (ref 0–0.4)
EOSINOPHIL NFR BLD AUTO: 4.8 % (ref 0.3–6.2)
ERYTHROCYTE [DISTWIDTH] IN BLOOD BY AUTOMATED COUNT: 22.8 % (ref 12.3–15.4)
FERRITIN SERPL-MCNC: 14.3 NG/ML (ref 13–150)
FOLATE SERPL-MCNC: 11.2 NG/ML (ref 4.78–24.2)
GLOBULIN UR ELPH-MCNC: 2.3 GM/DL
GLUCOSE SERPL-MCNC: 87 MG/DL (ref 65–99)
HBA1C MFR BLD: 5.5 % (ref 4.8–5.6)
HCT VFR BLD AUTO: 28.1 % (ref 34–46.6)
HDLC SERPL-MCNC: 54 MG/DL (ref 40–60)
HGB BLD-MCNC: 8.1 G/DL (ref 12–15.9)
IRON 24H UR-MRATE: 19 MCG/DL (ref 37–145)
IRON SATN MFR SERPL: 6 % (ref 20–50)
LDLC SERPL CALC-MCNC: 64 MG/DL (ref 0–100)
LDLC/HDLC SERPL: 1.2 {RATIO}
LYMPHOCYTES # BLD AUTO: 1.91 10*3/MM3 (ref 0.7–3.1)
LYMPHOCYTES NFR BLD AUTO: 34.9 % (ref 19.6–45.3)
MCH RBC QN AUTO: 20.9 PG (ref 26.6–33)
MCHC RBC AUTO-ENTMCNC: 28.8 G/DL (ref 31.5–35.7)
MCV RBC AUTO: 72.6 FL (ref 79–97)
MONOCYTES # BLD AUTO: 0.59 10*3/MM3 (ref 0.1–0.9)
MONOCYTES NFR BLD AUTO: 10.8 % (ref 5–12)
NEUTROPHILS NFR BLD AUTO: 2.66 10*3/MM3 (ref 1.7–7)
NEUTROPHILS NFR BLD AUTO: 48.6 % (ref 42.7–76)
NT-PROBNP SERPL-MCNC: 5848 PG/ML (ref 0–1800)
PLATELET # BLD AUTO: 227 10*3/MM3 (ref 140–450)
PMV BLD AUTO: 9.4 FL (ref 6–12)
POTASSIUM SERPL-SCNC: 3.9 MMOL/L (ref 3.5–5.2)
PROT SERPL-MCNC: 5.2 G/DL (ref 6–8.5)
RBC # BLD AUTO: 3.87 10*6/MM3 (ref 3.77–5.28)
SODIUM SERPL-SCNC: 139 MMOL/L (ref 136–145)
TIBC SERPL-MCNC: 341 MCG/DL (ref 298–536)
TRANSFERRIN SERPL-MCNC: 229 MG/DL (ref 200–360)
TRIGL SERPL-MCNC: 67 MG/DL (ref 0–150)
TSH SERPL DL<=0.05 MIU/L-ACNC: 2.61 UIU/ML (ref 0.27–4.2)
UNIT  ABO: NORMAL
UNIT  ABO: NORMAL
UNIT  RH: NORMAL
UNIT  RH: NORMAL
VIT B12 BLD-MCNC: 1177 PG/ML (ref 211–946)
VLDLC SERPL-MCNC: 14 MG/DL (ref 5–40)
WBC NRBC COR # BLD AUTO: 5.47 10*3/MM3 (ref 3.4–10.8)

## 2024-02-22 PROCEDURE — 97530 THERAPEUTIC ACTIVITIES: CPT

## 2024-02-22 PROCEDURE — 82728 ASSAY OF FERRITIN: CPT | Performed by: HOSPITALIST

## 2024-02-22 PROCEDURE — 82746 ASSAY OF FOLIC ACID SERUM: CPT | Performed by: HOSPITALIST

## 2024-02-22 PROCEDURE — 97162 PT EVAL MOD COMPLEX 30 MIN: CPT

## 2024-02-22 PROCEDURE — 97166 OT EVAL MOD COMPLEX 45 MIN: CPT

## 2024-02-22 PROCEDURE — 83540 ASSAY OF IRON: CPT | Performed by: HOSPITALIST

## 2024-02-22 PROCEDURE — 80053 COMPREHEN METABOLIC PANEL: CPT | Performed by: HOSPITALIST

## 2024-02-22 PROCEDURE — 82607 VITAMIN B-12: CPT | Performed by: HOSPITALIST

## 2024-02-22 PROCEDURE — 85025 COMPLETE CBC W/AUTO DIFF WBC: CPT | Performed by: HOSPITALIST

## 2024-02-22 PROCEDURE — 97535 SELF CARE MNGMENT TRAINING: CPT

## 2024-02-22 PROCEDURE — 84443 ASSAY THYROID STIM HORMONE: CPT | Performed by: HOSPITALIST

## 2024-02-22 PROCEDURE — 80061 LIPID PANEL: CPT | Performed by: HOSPITALIST

## 2024-02-22 PROCEDURE — 83036 HEMOGLOBIN GLYCOSYLATED A1C: CPT | Performed by: HOSPITALIST

## 2024-02-22 PROCEDURE — 84466 ASSAY OF TRANSFERRIN: CPT | Performed by: HOSPITALIST

## 2024-02-22 RX ORDER — ASPIRIN 81 MG/1
81 TABLET, CHEWABLE ORAL DAILY
Status: DISCONTINUED | OUTPATIENT
Start: 2024-02-22 | End: 2024-02-26 | Stop reason: HOSPADM

## 2024-02-22 RX ORDER — FERROUS SULFATE 325(65) MG
325 TABLET ORAL
Status: DISCONTINUED | OUTPATIENT
Start: 2024-02-23 | End: 2024-02-26 | Stop reason: HOSPADM

## 2024-02-22 RX ADMIN — POLYETHYLENE GLYCOL 3350 17 G: 17 POWDER, FOR SOLUTION ORAL at 08:54

## 2024-02-22 RX ADMIN — PANTOPRAZOLE SODIUM 40 MG: 40 TABLET, DELAYED RELEASE ORAL at 17:07

## 2024-02-22 RX ADMIN — TRAZODONE HYDROCHLORIDE 50 MG: 50 TABLET ORAL at 08:55

## 2024-02-22 RX ADMIN — ASPIRIN 81 MG: 81 TABLET, CHEWABLE ORAL at 18:08

## 2024-02-22 RX ADMIN — PANTOPRAZOLE SODIUM 40 MG: 40 TABLET, DELAYED RELEASE ORAL at 08:55

## 2024-02-22 RX ADMIN — GALANTAMINE 8 MG: 4 TABLET, FILM COATED ORAL at 08:55

## 2024-02-22 RX ADMIN — DOCUSATE SODIUM 100 MG: 100 CAPSULE, LIQUID FILLED ORAL at 08:55

## 2024-02-22 NOTE — PLAN OF CARE
Goal Outcome Evaluation:  Plan of Care Reviewed With: patient           Outcome Evaluation: Pt is 89 yo female admitted to [facility] for anemia. PMH significant for dementia, oa, depression, recent prior hospitalization for anemia. Pt is LTC resident at NH, pt reports she is an independent ambulator, review of prior PT notes indicate patient unable to ambulate any significant distance during previous hospitalizations over past few years. Patient up in chair, awake and alert, agreeable to therapy. patient performed sit to stand with [place], attempted to step forward with rwx and modAx2 but unable to do, became very fearful, modAx2 for pivot from chair to bed. Pt demonstrates impairments consisting of generalized weakness, decreased activity tolerance, decreased balance and may benefit from PT. d/c plans are to return to LTC.      Anticipated Discharge Disposition (PT): extended care facility

## 2024-02-22 NOTE — THERAPY EVALUATION
"Patient Name: Marcella Stephens  : 1923    MRN: [id number]                              Today's Date: 2024       Admit Date: 2024    Visit Dx:     ICD-10-CM ICD-9-CM   1. Acute anemia  D64.9 285.9   2. Generalized weakness  R53.1 780.79     Patient Active Problem List   Diagnosis    Closed fracture of right hip    Fall as cause of accidental injury at home as place of occurrence    Alzheimer's dementia    Anemia    Vitamin D deficiency    Weakness of right upper extremity    Acute retention of urine    Hydronephrosis    Acute deep vein thrombosis (DVT) of brachial vein of right upper extremity    Recent cerebrovascular accident (CVA)    DVT of lower extremity, bilateral    Acute UTI    History of DVT (deep vein thrombosis)    Age-related physical debility    Hiatal hernia    Acute anemia     Past Medical History:   Diagnosis Date    Acute embolism and thombos unsp deep veins of low extrm, bi     Dysphagia     oropharyngeal phase    Prediabetes     Stroke     TIA (transient ischemic attack)     Urinary tract infection     Vascular dementia without behavioral disturbance      Past Surgical History:   Procedure Laterality Date    BACK SURGERY      FEMUR IM NAILING/RODDING Right 2021    Procedure: FEMUR INTRAMEDULLARY NAILING/RODDING;  Surgeon: Louis Scruggs MD;  Location: [facility];  Service: Orthopedics;  Laterality: Right;    KNEE SURGERY        General Information       Row Name 24 1608          Physical Therapy Time and Intention    Document Type evaluation  -EM     Mode of Treatment individual therapy;physical therapy  -EM       Row Name 24 1608          General Information    Patient Profile Reviewed yes  -EM     Prior Level of Function independent:  pt reports \"I walk all over\" without use of AD  -EM     Existing Precautions/Restrictions fall  -EM       Row Name 24 1608          Living Environment    People in Home facility resident  -EM       Row Name " 02/22/24 1608          Cognition    Orientation Status (Cognition) oriented x 3  -EM       Row Name 02/22/24 1608          Safety Issues, Functional Mobility    Impairments Affecting Function (Mobility) balance;endurance/activity tolerance;strength;pain  -EM               User Key  (r) = Recorded By, (t) = Taken By, (c) = Cosigned By      Initials Name Provider Type    Bing Aguilera PT Physical Therapist                   Mobility       Row Name 02/22/24 1610          Bed Mobility    Bed Mobility sit-supine  -EM     Sit-Supine [place] (Bed Mobility) minimum assist (75% patient effort)  -EM       Row Name 02/22/24 1610          Bed-Chair Transfer    Bed-Chair [place] (Transfers) moderate assist (50% patient effort)  -EM     Assistive Device (Bed-Chair Transfers) walker, front-wheeled  -EM     Comment, (Bed-Chair Transfer) pt agreeable to ambulate short distance but cried out loudly when attempted to take a step, became very fearful, pivot tsf from chair to bed  -EM       Row Name 02/22/24 1610          Sit-Stand Transfer    Sit-Stand [place] (Transfers) moderate assist (50% patient effort)  -EM     Assistive Device (Sit-Stand Transfers) walker, front-wheeled  -EM               User Key  (r) = Recorded By, (t) = Taken By, (c) = Cosigned By      Initials Name Provider Type    EM Bing Ambrosio PT Physical Therapist                   Obj/Interventions       Row Name 02/22/24 1611          Range of Motion Comprehensive    Comment, General Range of Motion chan LEs WNL, chan UEs limited to about 90 sh flx  -EM       Row Name 02/22/24 1611          Strength Comprehensive (MMT)    General Manual Muscle Testing (MMT) Assessment other (see comments)  -EM     Comment, General Manual Muscle Testing (MMT) Assessment no focal deficits identified, generalized weakness noted  -EM       Row Name 02/22/24 1611          Balance    Static Sitting Balance standby assist  -EM     Dynamic Sitting Balance  "contact guard  -EM     Position, Sitting Balance sitting edge of bed  -EM     Static Standing Balance minimal assist  -EM     Dynamic Standing Balance moderate assist  -EM     Position/Device Used, Standing Balance walker, rolling  -EM       Row Name 02/22/24 1611          Sensory Assessment (Somatosensory)    Sensory Assessment (Somatosensory) sensation intact  -EM               User Key  (r) = Recorded By, (t) = Taken By, (c) = Cosigned By      Initials Name Provider Type    Bing Aguilera PT Physical Therapist                   Goals/Plan       Row Name 02/22/24 1616          Bed Mobility Goal 1 (PT)    Activity/Assistive Device (Bed Mobility Goal 1, PT) bed mobility activities, all  -EM     [place] Level/Cues Needed (Bed Mobility Goal 1, PT) contact guard required  -EM     Time Frame (Bed Mobility Goal 1, PT) 1 week  -EM       Row Name 02/22/24 1616          Transfer Goal 1 (PT)    Activity/Assistive Device (Transfer Goal 1, PT) transfers, all  -EM     [place] Level/Cues Needed (Transfer Goal 1, PT) minimum assist (75% or more patient effort)  -EM     Time Frame (Transfer Goal 1, PT) 1 week  -EM       Row Name 02/22/24 1616          Therapy Assessment/Plan (PT)    Planned Therapy Interventions (PT) bed mobility training;home exercise program;patient/family education;transfer training  -EM               User Key  (r) = Recorded By, (t) = Taken By, (c) = Cosigned By      Initials Name Provider Type    Bing Aguilera PT Physical Therapist                   Clinical Impression       Row Name 02/22/24 1612          Pain    Pretreatment Pain Rating 0/10 - no pain  -EM     Pre/Posttreatment Pain Comment no c/o pain at rest, states \"I just don't feel good\"  -EM     Pain Intervention(s) Repositioned  -EM       Row Name 02/22/24 1612          Plan of Care Review    Plan of Care Reviewed With patient  -EM     Outcome Evaluation Pt is 89 yo female admitted to [facility] for anemia. PMH significant " for dementia, oa, depression, recent prior hospitalization for anemia. Pt is LTC resident at NH, pt reports she is an independent ambulator, review of prior PT notes indicate patient unable to ambulate any significant distance during previous hospitalizations over past few years. Patient up in chair, awake and alert, agreeable to therapy. patient performed sit to stand with [place], attempted to step forward with rwx and modAx2 but unable to do, became very fearful, modAx2 for pivot from chair to bed. Pt demonstrates impairments consisting of generalized weakness, decreased activity tolerance, decreased balance and may benefit from PT. d/c plans are to return to LTC.  -EM       Row Name 02/22/24 1612          Therapy Assessment/Plan (PT)    Patient/Family Therapy Goals Statement (PT) go home  -EM     Rehab Potential (PT) fair, will monitor progress closely  -EM     Criteria for Skilled Interventions Met (PT) yes;skilled treatment is necessary  -EM     Therapy Frequency (PT) 3 times/wk  -EM       Row Name 02/22/24 1612          Positioning and Restraints    Pre-Treatment Position sitting in chair/recliner  -EM     Post Treatment Position bed  -EM     In Bed supine;call light within reach;exit alarm on;with nsg  -EM               User Key  (r) = Recorded By, (t) = Taken By, (c) = Cosigned By      Initials Name Provider Type    Bing Aguilera, PT Physical Therapist                   Outcome Measures       Row Name 02/22/24 1616 02/22/24 1400       How much help from another person do you currently need...    Turning from your back to your side while in flat bed without using bedrails? 3  -EM 3  -GR    Moving from lying on back to sitting on the side of a flat bed without bedrails? 2  -EM 3  -GR    Moving to and from a bed to a chair (including a wheelchair)? 2  -EM 2  -GR    Standing up from a chair using your arms (e.g., wheelchair, bedside chair)? 2  -EM 2  -GR    Climbing 3-5 steps with a railing? 1  -EM 1   -GR    To walk in hospital room? 1  -EM 2  -GR    AM-PAC 6 Clicks Score (PT) 11  -EM 13  -GR    Highest Level of Mobility Goal 4 --> Transfer to chair/commode  -EM 4 --> Transfer to chair/commode  -GR      Row Name 02/22/24 0800          How much help from another person do you currently need...    Turning from your back to your side while in flat bed without using bedrails? 3  -GR     Moving from lying on back to sitting on the side of a flat bed without bedrails? 3  -GR     Moving to and from a bed to a chair (including a wheelchair)? 2  -GR     Standing up from a chair using your arms (e.g., wheelchair, bedside chair)? 2  -GR     Climbing 3-5 steps with a railing? 1  -GR     To walk in hospital room? 2  -GR     AM-PAC 6 Clicks Score (PT) 13  -GR     Highest Level of Mobility Goal 4 --> Transfer to chair/commode  -GR       Row Name 02/22/24 1525          Modified [place] Scale    Modified [place] Scale 4 - Moderately severe disability.  Unable to walk without assistance, and unable to attend to own bodily needs without assistance.  -       Row Name 02/22/24 1525          Functional Assessment    Outcome Measure Options AM-PAC 6 Clicks Daily Activity (OT);Modified [place]  -               User Key  (r) = Recorded By, (t) = Taken By, (c) = Cosigned By      Initials Name Provider Type    EM Bing Ambrosio, PT Physical Therapist     Elvira Sherman, OT Occupational Therapist    Lillian Ford, RN Registered Nurse                                 Physical Therapy Education       Title: PT OT SLP Therapies (In Progress)       Topic: Physical Therapy (In Progress)       Point: Mobility training (Done)       Learning Progress Summary             Patient Acceptance, E, VU,NR by EM at 2/22/2024 5077                         Point: Home exercise program (Not Started)       Learner Progress:  Not documented in this visit.              Point: Body mechanics (Not Started)       Learner Progress:  Not documented in  this visit.              Point: Precautions (Not Started)       Learner Progress:  Not documented in this visit.                              User Key       Initials Effective Dates Name Provider Type Discipline    EM 06/16/21 -  Bing Ambrosio PT Physical Therapist PT                  PT Recommendation and Plan  Planned Therapy Interventions (PT): bed mobility training, home exercise program, patient/family education, transfer training  Plan of Care Reviewed With: patient  Outcome Evaluation: Pt is 89 yo female admitted to [facility] for anemia. PMH significant for dementia, oa, depression, recent prior hospitalization for anemia. Pt is LTC resident at NH, pt reports she is an independent ambulator, review of prior PT notes indicate patient unable to ambulate any significant distance during previous hospitalizations over past few years. Patient up in chair, awake and alert, agreeable to therapy. patient performed sit to stand with [place], attempted to step forward with rwx and modAx2 but unable to do, became very fearful, modAx2 for pivot from chair to bed. Pt demonstrates impairments consisting of generalized weakness, decreased activity tolerance, decreased balance and may benefit from PT. d/c plans are to return to LTC.     Time Calculation:         PT Charges       Row Name 02/22/24 1617             Time Calculation    Start Time 1510  -EM      Stop Time 1525  -EM      Time Calculation (min) 15 min  -EM      PT Received On 02/22/24  -EM      PT - Next Appointment 02/26/24  -EM      PT Goal Re-Cert Due Date 02/29/24  -EM         Time Calculation- PT    Total Timed Code Minutes- PT 10 minute(s)  -EM         Timed Charges    59087 - PT Therapeutic Activity Minutes 10  -EM         Total Minutes    Timed Charges Total Minutes 10  -EM       Total Minutes 10  -EM                User Key  (r) = Recorded By, (t) = Taken By, (c) = Cosigned By      Initials Name Provider Type    EM Bing Ambrosio PT Physical  Therapist                  Therapy Charges for Today       Code Description Service Date Service Provider Modifiers Qty    09711720986  PT THERAPEUTIC ACT EA 15 MIN 2/22/2024 Bing Ambrosio, PT GP 1    48509943694 HC PT EVAL MOD COMPLEXITY 3 2/22/2024 Bing Ambrosio, PT GP 1    69348222290 HC PT THER SUPP EA 15 MIN 2/22/2024 Bing Ambrosio, PT GP 1            PT G-Codes  Outcome Measure Options: AM-PAC 6 Clicks Daily Activity (OT), Modified [place]  AM-PAC 6 Clicks Score (PT): 11  AM-PAC 6 Clicks Score (OT): 14  Modified [place] Scale: 4 - Moderately severe disability.  Unable to walk without assistance, and unable to attend to own bodily needs without assistance.  PT Discharge Summary  Anticipated Discharge Disposition (PT): extended care facility    Bing Ambrosio, PT  2/22/2024

## 2024-02-22 NOTE — PROGRESS NOTES
"Daily progress note    Primary care physician      Subjective  Awake and alert and following commands with no specific complaints and in no distress    History of present illness  70-year-old white female with history of severe dementia TIA CVA osteoarthritis and chronic anemia sent to the ER at [facility] with complaint of generalized weakness and laboratory showed significant anemia.  Patient is poor historian but denies any abdominal pain nausea vomiting black stools or blood in the stools.  Patient is on Eliquis for history of DVT.  Patient workup in ER revealed symptomatic anemia admitted for transfusion and anemia workup.  Patient is DNR per her wishes.    REVIEW OF SYSTEMS  Constitutional:  Negative for fever.   HENT:  Negative for sore throat.    Eyes: Negative.    Respiratory:  Negative for cough and shortness of breath.    Cardiovascular:  Negative for chest pain.   Gastrointestinal:  Negative for abdominal pain, diarrhea and vomiting.   Genitourinary:  Negative for dysuria.   Musculoskeletal:  Negative for neck pain.   Skin:  Negative for rash.   Allergic/Immunologic: Negative.    Neurological:  Positive for weakness. Negative for numbness and headaches.   Hematological: Negative.    Psychiatric/Behavioral: Negative.     All other systems reviewed and are negative.     PHYSICAL EXAM  Blood pressure 109/52, pulse 70, temperature 97.7 °F (36.5 °C), temperature source Oral, resp. rate 16, height 157.5 cm (62\"), weight 49.4 kg (109 lb), SpO2 94%.    Constitutional:       General: She is not in acute distress.     Appearance: Normal appearance. She is not ill-appearing or toxic-appearing.   HENT:      Head: Normocephalic and atraumatic.   Eyes:      Extraocular Movements: Extraocular movements intact.      Pupils: Pupils are equal, round, and reactive to light.   Cardiovascular:      Rate and Rhythm: Normal rate and regular rhythm.      Heart sounds: No murmur heard.     No friction rub. No " gallop.   Pulmonary:      Effort: Pulmonary effort is normal.      Breath sounds: Normal breath sounds.   Abdominal:      General: Abdomen is flat. There is no distension.      Palpations: Abdomen is soft.      Tenderness: There is no abdominal tenderness.   Musculoskeletal:         General: No swelling or tenderness. Normal range of motion.      Cervical back: Normal range of motion and neck supple.   Skin:     General: Skin is warm and dry.      Coloration: Skin is pale.   Neurological:      General: No focal deficit present.      Mental Status: She is alert and oriented to person, place, and time.      Sensory: No sensory deficit.      Motor: No weakness.   Psychiatric:         Mood and Affect: Mood normal.         Behavior: Behavior normal.      LAB RESULTS  Lab Results (last 24 hours)       Procedure Component Value Units Date/Time    Hemoglobin A1c [[id number]]  (Normal) Collected: 02/22/24 0513    Specimen: Blood from Hand, Right Updated: 02/22/24 0633     Hemoglobin A1C 5.50 %     Narrative:      Hemoglobin A1C Ranges:    Increased Risk for Diabetes  5.7% to 6.4%  Diabetes                     >= 6.5%  Diabetic Goal                < 7.0%    CBC & Differential [[id number]]  (Abnormal) Collected: 02/22/24 0513    Specimen: Blood from Hand, Right Updated: 02/22/24 0614    Narrative:      The following orders were created for panel order CBC & Differential.  Procedure                               Abnormality         Status                     ---------                               -----------         ------                     CBC Auto Differential[[id number]]        Abnormal            Final result                 Please view results for these tests on the individual orders.    CBC Auto Differential [[id number]]  (Abnormal) Collected: 02/22/24 0513    Specimen: Blood from Hand, Right Updated: 02/22/24 0614     WBC 5.47 10*3/mm3      RBC 3.87 10*6/mm3      Hemoglobin 8.1 g/dL      Hematocrit 28.1 %      MCV 72.6 fL       MCH 20.9 pg      MCHC 28.8 g/dL      RDW 22.8 %      RDW-SD 58.4 fl      MPV 9.4 fL      Platelets 227 10*3/mm3      Neutrophil % 48.6 %      Lymphocyte % 34.9 %      Monocyte % 10.8 %      Eosinophil % 4.8 %      Basophil % 0.5 %      Neutrophils, Absolute 2.66 10*3/mm3      Lymphocytes, Absolute 1.91 10*3/mm3      Monocytes, Absolute 0.59 10*3/mm3      Eosinophils, Absolute 0.26 10*3/mm3      Basophils, Absolute 0.03 10*3/mm3     Vitamin B12 [[id number]]  (Abnormal) Collected: 02/22/24 0513    Specimen: Blood from Hand, Right Updated: 02/22/24 0611     Vitamin B-12 1,177 pg/mL     Narrative:      Results may be falsely increased if patient taking Biotin.      Folate [[id number]]  (Normal) Collected: 02/22/24 0513    Specimen: Blood from Hand, Right Updated: 02/22/24 0611     Folate 11.20 ng/mL     Narrative:      Results may be falsely increased if patient taking Biotin.      TSH [[id number]]  (Normal) Collected: 02/22/24 0513    Specimen: Blood from Hand, Right Updated: 02/22/24 0603     TSH 2.610 uIU/mL     Ferritin [[id number]]  (Normal) Collected: 02/22/24 0513    Specimen: Blood from Hand, Right Updated: 02/22/24 0603     Ferritin 14.30 ng/mL     Narrative:      Results may be falsely decreased if patient taking Biotin.      Comprehensive Metabolic Panel [[id number]]  (Abnormal) Collected: 02/22/24 0513    Specimen: Blood from Hand, Right Updated: 02/22/24 0559     Glucose 87 mg/dL      BUN 15 mg/dL      Creatinine 0.50 mg/dL      Sodium 139 mmol/L      Potassium 3.9 mmol/L      Chloride 109 mmol/L      CO2 25.0 mmol/L      Calcium 8.4 mg/dL      Total Protein 5.2 g/dL      Albumin 2.9 g/dL      ALT (SGPT) 6 U/L      AST (SGOT) 12 U/L      Alkaline Phosphatase 59 U/L      Total Bilirubin 0.8 mg/dL      Globulin 2.3 gm/dL      A/G Ratio 1.3 g/dL      BUN/Creatinine Ratio 30.0     Anion Gap 5.0 mmol/L      eGFR 83.8 mL/min/1.73     Narrative:      GFR Normal >60  Chronic Kidney Disease <60  Kidney Failure  <15    The GFR formula is only valid for adults with stable renal function between ages 18 and 70.    Lipid Panel [[id number]] Collected: 02/22/24 0513    Specimen: Blood from Hand, Right Updated: 02/22/24 0559     Total Cholesterol 132 mg/dL      Triglycerides 67 mg/dL      HDL Cholesterol 54 mg/dL      LDL Cholesterol  64 mg/dL      VLDL Cholesterol 14 mg/dL      LDL/HDL Ratio 1.20    Narrative:      Cholesterol Reference Ranges  (U.S. Department of Health and Human Services ATP III Classifications)    Desirable          <200 mg/dL  Borderline High    200-239 mg/dL  High Risk          >240 mg/dL      Triglyceride Reference Ranges  (U.S. Department of Health and Human Services ATP III Classifications)    Normal           <150 mg/dL  Borderline High  150-199 mg/dL  High             200-499 mg/dL  Very High        >500 mg/dL    HDL Reference Ranges  (U.S. Department of Health and Human Services ATP III Classifications)    Low     <40 mg/dl (major risk factor for CHD)  High    >60 mg/dl ('negative' risk factor for CHD)        LDL Reference Ranges  (U.S. Department of Health and Human Services ATP III Classifications)    Optimal          <100 mg/dL  Near Optimal     100-129 mg/dL  Borderline High  130-159 mg/dL  High             160-189 mg/dL  Very High        >189 mg/dL    Iron Profile [[id number]]  (Abnormal) Collected: 02/22/24 0513    Specimen: Blood from Hand, Right Updated: 02/22/24 0559     Iron 19 mcg/dL      Iron Saturation (TSAT) 6 %      Transferrin 229 mg/dL      TIBC 341 mcg/dL     BNP [[id number]]  (Abnormal) Collected: 02/21/24 2300    Specimen: Blood Updated: 02/22/24 0030     proBNP 5,848.0 pg/mL     Narrative:      This assay is used as an aid in the diagnosis of individuals suspected of having heart failure. It can be used as an aid in the diagnosis of acute decompensated heart failure (ADHF) in patients presenting with signs and symptoms of ADHF to the emergency department (ED). In addition, NT-proBNP  of <300 pg/mL indicates ADHF is not likely.    Age Range Result Interpretation  NT-proBNP Concentration (pg/mL:      <50             Positive            >450                   Gray                 300-450                    Negative             <300    50-75           Positive            >900                  Gray                300-900                  Negative            <300      >75             Positive            >1800                  Gray                300-1800                  Negative            <300    Hemoglobin & Hematocrit, Blood [[id number]]  (Abnormal) Collected: 02/21/24 1546    Specimen: Blood Updated: 02/21/24 1857     Hemoglobin 8.2 g/dL      Hematocrit 28.0 %     Reticulocytes [[id number]]  (Normal) Collected: 02/21/24 1546    Specimen: Blood Updated: 02/21/24 1650     Reticulocyte % 1.05 %      Reticulocyte Absolute 0.0356 10*6/mm3           Imaging Results (Last 24 Hours)       ** No results found for the last 24 hours. **            Current Facility-Administered Medications:     docusate sodium (COLACE) capsule 100 mg, 100 mg, Oral, BID, Delio Melchor MD, 100 mg at 02/22/24 0855    galantamine (RAZADYNE) tablet 8 mg, 8 mg, Oral, Daily, Delio Melchor MD, 8 mg at 02/22/24 0855    melatonin tablet 3 mg, 3 mg, Oral, Nightly PRN, Delio Melchor MD    multivitamin with minerals 1 tablet, 1 tablet, Oral, Daily, Delio Melchor MD, 1 tablet at 02/21/24 1504    pantoprazole (PROTONIX) EC tablet 40 mg, 40 mg, Oral, BID AC, Delio Melchor MD, 40 mg at 02/22/24 0855    polyethylene glycol (MIRALAX) packet 17 g, 17 g, Oral, Daily, Delio Melchor MD, 17 g at 02/22/24 0854    [COMPLETED] Insert Peripheral IV, , , Once **AND** sodium chloride 0.9 % flush 10 mL, 10 mL, Intravenous, PRN, Farhan Dorsey MD    traZODone (DESYREL) tablet 50 mg, 50 mg, Oral, Daily, Delio Melchor MD, 50 mg at 02/22/24 0855     ASSESSMENT  Symptomatic anemia  Acute on chronic anemia  Severe  dementia  Osteoarthritis  Depression    PLAN  CPM  Iron supplement  Resume aspirin but discontinue Eliquis due to risk of frequent falls  Adjust nursing home medications  Stress ulcer DVT prophylaxis  Supportive care  PT OT  DNR  Discussed with nursing staff  Discharge planning    JO MCKEON MD    Copied text in this note has been reviewed and is accurate as of 02/22/24

## 2024-02-22 NOTE — CASE MANAGEMENT/SOCIAL WORK
Continued Stay Note  [facility]     Patient Name: Marcella Stephens  MRN: [id number]  Today's Date: 2/22/2024    Admit Date: 2/21/2024    Plan: From [facility] private pay. Per liaison, patient may return.   Discharge Plan       Row Name 02/22/24 1353       Plan    Plan From [facility] private pay. Per liaison, patient may return.    Patient/Family in Agreement with Plan unable to assess    Plan Comments Spoke with jessica Farrell from [facility] and he states patient is LTC, is private pay and may return. Call placed to patients daughterBonita, at [phone]. Left voicemail to call [facility]. and left phone number to confirm return and transportation.                    Expected Discharge Date and Time       Expected Discharge Date Expected Discharge Time    Feb 23, 2024               Lizz Springer RN

## 2024-02-22 NOTE — THERAPY EVALUATION
Patient Name: Marcella Stephens  : 1923    MRN: [id number]                              Today's Date: 2024       Admit Date: 2024    Visit Dx:     ICD-10-CM ICD-9-CM   1. Acute anemia  D64.9 285.9   2. Generalized weakness  R53.1 780.79     Patient Active Problem List   Diagnosis    Closed fracture of right hip    Fall as cause of accidental injury at home as place of occurrence    Alzheimer's dementia    Anemia    Vitamin D deficiency    Weakness of right upper extremity    Acute retention of urine    Hydronephrosis    Acute deep vein thrombosis (DVT) of brachial vein of right upper extremity    Recent cerebrovascular accident (CVA)    DVT of lower extremity, bilateral    Acute UTI    History of DVT (deep vein thrombosis)    Age-related physical debility    Hiatal hernia    Acute anemia     Past Medical History:   Diagnosis Date    Acute embolism and thombos unsp deep veins of low extrm, bi     Dysphagia     oropharyngeal phase    Prediabetes     Stroke     TIA (transient ischemic attack)     Urinary tract infection     Vascular dementia without behavioral disturbance      Past Surgical History:   Procedure Laterality Date    BACK SURGERY      FEMUR IM NAILING/RODDING Right 2021    Procedure: FEMUR INTRAMEDULLARY NAILING/RODDING;  Surgeon: Louis Scruggs MD;  Location: [facility];  Service: Orthopedics;  Laterality: Right;    KNEE SURGERY        General Information       Row Name 24 1518          OT Time and Intention    Document Type evaluation  -     Mode of Treatment individual therapy;occupational therapy  -       Row Name 24 1518          General Information    Patient Profile Reviewed yes  -     Prior Level of Function min assist:  Lives at [facility]  -     Existing Precautions/Restrictions fall  -     Barriers to Rehab previous functional deficit;cognitive status  -       Row Name 24 1518          Living Environment    People  in Home facility resident  -       Row Name 02/22/24 1518          Safety Issues, Functional Mobility    Impairments Affecting Function (Mobility) balance;cognition;endurance/activity tolerance;strength  -     Cognitive Impairments, Mobility Safety/Performance attention;impulsivity;insight into deficits/self-awareness;problem-solving/reasoning  -               User Key  (r) = Recorded By, (t) = Taken By, (c) = Cosigned By      Initials Name Provider Type     Elvira Sherman OT Occupational Therapist                     Mobility/ADL's       [facility] Name 02/22/24 1519          Bed Mobility    Bed Mobility bed mobility (all) activities  -     All Activities, [place] (Bed Mobility) minimum assist (75% patient effort)  -     Assistive Device (Bed Mobility) bed rails;head of bed elevated  -[facility] Name 02/22/24 1519          Transfers    Transfers sit-stand transfer;bed-chair transfer  -[facility] Name 02/22/24 1519          Bed-Chair Transfer    Bed-Chair [place] (Transfers) moderate assist (50% patient effort)  -     Comment, (Bed-Chair Transfer) Posterior lean with HHA today, would benefit from walker for stability  -[facility] Name 02/22/24 1519          Sit-Stand Transfer    Sit-Stand [place] (Transfers) moderate assist (50% patient effort);minimum assist (75% patient effort)  -     Comment, (Sit-Stand Transfer) HHA, posterior lean  -[facility] Name 02/22/24 1519          Functional Mobility    Functional Mobility- Safety Issues weight-shifting ability decreased;loses balance backward  -[facility] Name 02/22/24 1519          Activities of Daily Living    BADL Assessment/Intervention grooming  -[facility] Name 02/22/24 1519          Grooming Assessment/Training    [place] Level (Grooming) grooming skills;standby assist;wash face, hands  -     Position (Grooming) sitting up in bed  -               User Key  (r) = Recorded By, (t) = Taken By, (c) = Cosigned By       Initials Name Provider Type     Elvira Sherman OT Occupational Therapist                   Obj/Interventions       [facility] Name 02/22/24 1521          Sensory Assessment (Somatosensory)    Sensory Assessment (Somatosensory) sensation intact  -[facility] Name 02/22/24 1521          Range of Motion Comprehensive    General Range of Motion bilateral upper extremity ROM WFL  -LH       Row Name 02/22/24 1521          Strength Comprehensive (MMT)    General Manual Muscle Testing (MMT) Assessment upper extremity strength deficits identified  -     Comment, General Manual Muscle Testing (MMT) Assessment Generalized weakness/ deconditioning  -LH       Row Name 02/22/24 1521          Motor Skills    Motor Skills functional endurance  -LH       Row Name 02/22/24 1521          Balance    Balance Assessment sitting static balance;sitting dynamic balance;standing static balance;sit to stand dynamic balance;standing dynamic balance  -     Static Sitting Balance standby assist  -     Dynamic Sitting Balance standby assist  -     Position, Sitting Balance unsupported;sitting edge of bed  -     Sit to Stand Dynamic Balance minimal assist;moderate assist  -     Static Standing Balance minimal assist;verbal cues  -     Dynamic Standing Balance minimal assist;verbal cues  -     Position/Device Used, Standing Balance --  HHA  -     Balance Interventions sitting;standing;occupation based/functional task;sit to stand  -               User Key  (r) = Recorded By, (t) = Taken By, (c) = Cosigned By      Initials Name Provider Type     Elvira Sherman OT Occupational Therapist                   Goals/Plan       Row Name 02/22/24 1525          Bed Mobility Goal 1 (OT)    Activity/Assistive Device (Bed Mobility Goal 1, OT) bed mobility activities, all  -     [place] Level/Cues Needed (Bed Mobility Goal 1, OT) minimum assist (75% or more patient effort)  -     Time Frame (Bed Mobility Goal 1, OT) short term goal  (STG);2 weeks  -       Row Name 02/22/24 1525          Transfer Goal 1 (OT)    Activity/Assistive Device (Transfer Goal 1, OT) transfers, all  -     [place] Level/Cues Needed (Transfer Goal 1, OT) minimum assist (75% or more patient effort)  -     Time Frame (Transfer Goal 1, OT) short term goal (STG);2 weeks  -     Progress/Outcome (Transfer Goal 1, OT) new goal  -       Row Name 02/22/24 1525          Dressing Goal 1 (OT)    Activity/Device (Dressing Goal 1, OT) dressing skills, all;upper body dressing;lower body dressing  -     [place]/Cues Needed (Dressing Goal 1, OT) minimum assist (75% or more patient effort)  -     Time Frame (Dressing Goal 1, OT) short term goal (STG);2 weeks  -     Progress/Outcome (Dressing Goal 1, OT) new goal  -[facility] Name 02/22/24 1525          Toileting Goal 1 (OT)    Activity/Device (Toileting Goal 1, OT) toileting skills, all  -     [place] Level/Cues Needed (Toileting Goal 1, OT) minimum assist (75% or more patient effort)  -     Time Frame (Toileting Goal 1, OT) short term goal (STG);2 weeks  -     Progress/Outcome (Toileting Goal 1, OT) new goal  -[facility] Name 02/22/24 1525          Grooming Goal 1 (OT)    Activity/Device (Grooming Goal 1, OT) grooming skills, all  -     [place] (Grooming Goal 1, OT) minimum assist (75% or more patient effort)  -     Time Frame (Grooming Goal 1, OT) short term goal (STG);2 weeks  -     Progress/Outcome (Grooming Goal 1, OT) new goal  -       Row Name 02/22/24 1525          Therapy Assessment/Plan (OT)    Planned Therapy Interventions (OT) activity tolerance training;BADL retraining;functional balance retraining;occupation/activity based interventions;patient/caregiver education/training;strengthening exercise;transfer/mobility retraining  -               User Key  (r) = Recorded By, (t) = Taken By, (c) = Cosigned By      Initials Name Provider Type     Elvira Sherman, OT Occupational  Therapist                   Clinical Impression       Row Name 02/22/24 1522          Pain Assessment    Pretreatment Pain Rating 0/10 - no pain  -     Posttreatment Pain Rating 0/10 - no pain  -       Row Name 02/22/24 1522          Plan of Care Review    Plan of Care Reviewed With patient  -     Outcome Evaluation Patient is a 89 year old female admitted to [facility] with anemia. Patient is a facility resident at [facility], has hx of dementia, and poor historian, unsure of functional baseline at NH. Patient performed bed mobility with min A, able to sit unsupported at EOB, follows commands, and oriented to self, but confused/forgetful. Patient performed sit to stand transfer from EOB with min/mod A due to posterior lean with HHA. Patient performed bed to chair transfer with mod A requiring increased cues and expresses fear of falling, continues with posterior lean and would likely benefit from use of a walker. Patient will continue to benefit from skilled OT during acute stay to address generalized weakness and deconditioning, anticipate dc back to LTC at acute dc.  -       Row Name 02/22/24 1522          Therapy Assessment/Plan (OT)    Rehab Potential (OT) good, to achieve stated therapy goals  -     Criteria for Skilled Therapeutic Interventions Met (OT) yes;meets criteria;skilled treatment is necessary  -     Therapy Frequency (OT) 3 times/wk  -       Row Name 02/22/24 1522          Therapy Plan Review/Discharge Plan (OT)    Anticipated Discharge Disposition (OT) extended care facility  -       Row Name 02/22/24 1522          Positioning and Restraints    Pre-Treatment Position in bed  -     Post Treatment Position chair  -     In Chair reclined;call light within reach;encouraged to call for assist;exit alarm on  -               User Key  (r) = Recorded By, (t) = Taken By, (c) = Cosigned By      Initials Name Provider Type     Elvira Sherman, OT Occupational Therapist                    Outcome Measures       [facility] Name 02/22/24 1525          How much help from another is currently needed...    Putting on and taking off regular lower body clothing? 2  -LH     Bathing (including washing, rinsing, and drying) 2  -LH     Toileting (which includes using toilet bed pan or urinal) 2  -LH     Putting on and taking off regular upper body clothing 2  -LH     Taking care of personal grooming (such as brushing teeth) 3  -LH     Eating meals 3  -     AM-PAC 6 Clicks Score (OT) 14  -[facility] Name 02/22/24 0800          How much help from another person do you currently need...    Turning from your back to your side while in flat bed without using bedrails? 3  -GR     Moving from lying on back to sitting on the side of a flat bed without bedrails? 3  -GR     Moving to and from a bed to a chair (including a wheelchair)? 2  -GR     Standing up from a chair using your arms (e.g., wheelchair, bedside chair)? 2  -GR     Climbing 3-5 steps with a railing? 1  -GR     To walk in hospital room? 2  -GR     AM-PAC 6 Clicks Score (PT) 13  -GR     Highest Level of Mobility Goal 4 --> Transfer to chair/commode  -[facility] Name 02/22/24 1525          Modified [place] Scale    Modified Pender Scale 4 - Moderately severe disability.  Unable to walk without assistance, and unable to attend to own bodily needs without assistance.  -[facility] Name 02/22/24 1525          Functional Assessment    Outcome Measure Options AM-PAC 6 Clicks Daily Activity (OT);Modified Pender  -               User Key  (r) = Recorded By, (t) = Taken By, (c) = Cosigned By      Initials Name Provider Type     Elvira Sherman OT Occupational Therapist    Lillian Ford, RN Registered Nurse                    Occupational Therapy Education       Title: PT OT SLP Therapies (In Progress)       Topic: Occupational Therapy (Done)       Point: ADL training (Done)       Description:   Instruct learner(s) on proper safety adaptation and remediation  techniques during self care or transfers.   Instruct in proper use of assistive devices.                  Learning Progress Summary             Patient Acceptance, E,TB, VU,NR by  at 2/22/2024 1526    Comment: Instructed in role of OT, discharge planning, fall prevention, and use of call light for assistance. Patient will require further training for independence                         Point: Home exercise program (Done)       Description:   Instruct learner(s) on appropriate technique for monitoring, assisting and/or progressing therapeutic exercises/activities.                  Learning Progress Summary             Patient Acceptance, E,TB, VU,NR by  at 2/22/2024 1526    Comment: Instructed in role of OT, discharge planning, fall prevention, and use of call light for assistance. Patient will require further training for independence                         Point: Precautions (Done)       Description:   Instruct learner(s) on prescribed precautions during self-care and functional transfers.                  Learning Progress Summary             Patient Acceptance, E,TB, VU,NR by  at 2/22/2024 1526    Comment: Instructed in role of OT, discharge planning, fall prevention, and use of call light for assistance. Patient will require further training for independence                         Point: Body mechanics (Done)       Description:   Instruct learner(s) on proper positioning and spine alignment during self-care, functional mobility activities and/or exercises.                  Learning Progress Summary             Patient Acceptance, E,TB, VU,NR by  at 2/22/2024 1526    Comment: Instructed in role of OT, discharge planning, fall prevention, and use of call light for assistance. Patient will require further training for independence                                         User Key       Initials Effective Dates Name Provider Type Discipline     08/31/23 -  Elvira Sherman OT Occupational Therapist OT                   OT Recommendation and Plan  Planned Therapy Interventions (OT): activity tolerance training, BADL retraining, functional balance retraining, occupation/activity based interventions, patient/caregiver education/training, strengthening exercise, transfer/mobility retraining  Therapy Frequency (OT): 3 times/wk  Plan of Care Review  Plan of Care Reviewed With: patient  Outcome Evaluation: Patient is a 89 year old female admitted to [facility] with anemia. Patient is a facility resident at [facility], has hx of dementia, and poor historian, unsure of functional baseline at NH. Patient performed bed mobility with min A, able to sit unsupported at EOB, follows commands, and oriented to self, but confused/forgetful. Patient performed sit to stand transfer from EOB with min/mod A due to posterior lean with HHA. Patient performed bed to chair transfer with mod A requiring increased cues and expresses fear of falling, continues with posterior lean and would likely benefit from use of a walker. Patient will continue to benefit from skilled OT during acute stay to address generalized weakness and deconditioning, anticipate dc back to LTC at acute NH.     Time Calculation:   Evaluation Complexity (OT)  Review Occupational Profile/Medical/Therapy History Complexity: expanded/moderate complexity  Assessment, Occupational Performance/Identification of Deficit Complexity: 3-5 performance deficits  Clinical Decision Making Complexity (OT): detailed assessment/moderate complexity  Overall Complexity of Evaluation (OT): moderate complexity     Time Calculation- OT       Row Name 02/22/24 1527             Time Calculation- OT    OT Start Time 1322  -      OT Stop Time 1338  -      OT Time Calculation (min) 16 min  -      Total Timed Code Minutes- OT 10 minute(s)  -      OT Non-Billable Time (min) 6 min  -      OT Received On 02/22/24  -      OT - Next Appointment 02/23/24  -      OT Goal Re-Cert Due Date 03/07/24   -LH         Timed Charges    71317 - OT Self Care/Mgmt Minutes 10  -LH         Untimed Charges    OT Eval/Re-eval Minutes 6  -LH         Total Minutes    Timed Charges Total Minutes 10  -LH      Untimed Charges Total Minutes 6  -LH       Total Minutes 16  -LH                User Key  (r) = Recorded By, (t) = Taken By, (c) = Cosigned By      Initials Name Provider Type     Elvira Sherman, OT Occupational Therapist                  Therapy Charges for Today       Code Description Service Date Service Provider Modifiers Qty    81209031688 HC OT SELF CARE/MGMT/TRAIN EA 15 MIN 2/22/2024 Elvira Sherman, OT GO 1    43721089357 HC OT EVAL MOD COMPLEXITY 2 2/22/2024 Elvira Sherman OT GO 1                 Elvira Sherman OT  2/22/2024

## 2024-02-22 NOTE — PLAN OF CARE
Goal Outcome Evaluation:           Progress: improving          VSS.  A-Ox3.  Pt reporting generalized pain, but refuses any intervention or pain medicine.  Pt still with no BM.  No signs of bleeding.  Will CTM.

## 2024-02-22 NOTE — PLAN OF CARE
Goal Outcome Evaluation:  Plan of Care Reviewed With: patient           Outcome Evaluation: Patient is a 89 year old female admitted to [facility] with anemia. Patient is a facility resident at [facility], has hx of dementia, and poor historian, unsure of functional baseline at NH. Patient performed bed mobility with min A, able to sit unsupported at EOB, follows commands, and oriented to self, but confused/forgetful. Patient performed sit to stand transfer from EOB with min/mod A due to posterior lean with HHA. Patient performed bed to chair transfer with mod A requiring increased cues and expresses fear of falling, continues with posterior lean and would likely benefit from use of a walker. Patient will continue to benefit from skilled OT during acute stay to address generalized weakness and deconditioning, anticipate dc back to LTC at acute MT.      Anticipated Discharge Disposition (OT): extended care facility

## 2024-02-23 PROBLEM — D64.9 SYMPTOMATIC ANEMIA: Status: ACTIVE | Noted: 2024-02-23

## 2024-02-23 LAB
ACANTHOCYTES BLD QL SMEAR: NORMAL
ANION GAP SERPL CALCULATED.3IONS-SCNC: 6 MMOL/L (ref 5–15)
ANISOCYTOSIS BLD QL: NORMAL
BASOPHILS # BLD MANUAL: 0.05 10*3/MM3 (ref 0–0.2)
BASOPHILS NFR BLD MANUAL: 1 % (ref 0–1.5)
BUN SERPL-MCNC: 11 MG/DL (ref 8–23)
BUN/CREAT SERPL: 19 (ref 7–25)
CALCIUM SPEC-SCNC: 8.8 MG/DL (ref 8.2–9.6)
CHLORIDE SERPL-SCNC: 108 MMOL/L (ref 98–107)
CO2 SERPL-SCNC: 26 MMOL/L (ref 22–29)
CREAT SERPL-MCNC: 0.58 MG/DL (ref 0.57–1)
DEPRECATED RDW RBC AUTO: 57.3 FL (ref 37–54)
EGFRCR SERPLBLD CKD-EPI 2021: 80.9 ML/MIN/1.73
ELLIPTOCYTES BLD QL SMEAR: NORMAL
ERYTHROCYTE [DISTWIDTH] IN BLOOD BY AUTOMATED COUNT: 23.2 % (ref 12.3–15.4)
GLUCOSE SERPL-MCNC: 88 MG/DL (ref 65–99)
HCT VFR BLD AUTO: 31.2 % (ref 34–46.6)
HGB BLD-MCNC: 8.9 G/DL (ref 12–15.9)
HYPOCHROMIA BLD QL: NORMAL
LYMPHOCYTES # BLD MANUAL: 1.1 10*3/MM3 (ref 0.7–3.1)
LYMPHOCYTES NFR BLD MANUAL: 6.2 % (ref 5–12)
MCH RBC QN AUTO: 20.4 PG (ref 26.6–33)
MCHC RBC AUTO-ENTMCNC: 28.5 G/DL (ref 31.5–35.7)
MCV RBC AUTO: 71.4 FL (ref 79–97)
MICROCYTES BLD QL: NORMAL
MONOCYTES # BLD: 0.33 10*3/MM3 (ref 0.1–0.9)
NEUTROPHILS # BLD AUTO: 3.87 10*3/MM3 (ref 1.7–7)
NEUTROPHILS NFR BLD MANUAL: 72.2 % (ref 42.7–76)
NRBC BLD AUTO-RTO: 0 /100 WBC (ref 0–0.2)
PLAT MORPH BLD: NORMAL
PLATELET # BLD AUTO: 278 10*3/MM3 (ref 140–450)
PMV BLD AUTO: 9.8 FL (ref 6–12)
POIKILOCYTOSIS BLD QL SMEAR: NORMAL
POLYCHROMASIA BLD QL SMEAR: NORMAL
POTASSIUM SERPL-SCNC: 4.1 MMOL/L (ref 3.5–5.2)
RBC # BLD AUTO: 4.37 10*6/MM3 (ref 3.77–5.28)
SCHISTOCYTES BLD QL SMEAR: NORMAL
SODIUM SERPL-SCNC: 140 MMOL/L (ref 136–145)
TARGETS BLD QL SMEAR: NORMAL
VARIANT LYMPHS NFR BLD MANUAL: 20.6 % (ref 19.6–45.3)
WBC MORPH BLD: NORMAL
WBC NRBC COR # BLD AUTO: 5.36 10*3/MM3 (ref 3.4–10.8)

## 2024-02-23 PROCEDURE — 80048 BASIC METABOLIC PNL TOTAL CA: CPT | Performed by: HOSPITALIST

## 2024-02-23 PROCEDURE — 85007 BL SMEAR W/DIFF WBC COUNT: CPT | Performed by: HOSPITALIST

## 2024-02-23 PROCEDURE — G0378 HOSPITAL OBSERVATION PER HR: HCPCS

## 2024-02-23 PROCEDURE — 85025 COMPLETE CBC W/AUTO DIFF WBC: CPT | Performed by: HOSPITALIST

## 2024-02-23 RX ORDER — MIRTAZAPINE 15 MG/1
15 TABLET, FILM COATED ORAL NIGHTLY
Status: DISCONTINUED | OUTPATIENT
Start: 2024-02-23 | End: 2024-02-26 | Stop reason: HOSPADM

## 2024-02-23 RX ORDER — MEGESTROL ACETATE 40 MG/ML
400 SUSPENSION ORAL DAILY
Status: DISCONTINUED | OUTPATIENT
Start: 2024-02-23 | End: 2024-02-25

## 2024-02-23 RX ADMIN — MEGESTROL ACETATE 400 MG: 400 SUSPENSION ORAL at 17:01

## 2024-02-23 RX ADMIN — PANTOPRAZOLE SODIUM 40 MG: 40 TABLET, DELAYED RELEASE ORAL at 17:01

## 2024-02-23 RX ADMIN — DOCUSATE SODIUM 100 MG: 100 CAPSULE, LIQUID FILLED ORAL at 09:39

## 2024-02-23 RX ADMIN — DOCUSATE SODIUM 100 MG: 100 CAPSULE, LIQUID FILLED ORAL at 21:23

## 2024-02-23 RX ADMIN — POLYETHYLENE GLYCOL 3350 17 G: 17 POWDER, FOR SOLUTION ORAL at 09:39

## 2024-02-23 RX ADMIN — TRAZODONE HYDROCHLORIDE 50 MG: 50 TABLET ORAL at 09:39

## 2024-02-23 RX ADMIN — FERROUS SULFATE TAB 325 MG (65 MG ELEMENTAL FE) 325 MG: 325 (65 FE) TAB at 09:39

## 2024-02-23 RX ADMIN — MIRTAZAPINE 15 MG: 15 TABLET, FILM COATED ORAL at 21:23

## 2024-02-23 RX ADMIN — Medication 1 TABLET: at 09:39

## 2024-02-23 RX ADMIN — GALANTAMINE 8 MG: 4 TABLET, FILM COATED ORAL at 09:39

## 2024-02-23 RX ADMIN — PANTOPRAZOLE SODIUM 40 MG: 40 TABLET, DELAYED RELEASE ORAL at 09:39

## 2024-02-23 RX ADMIN — ASPIRIN 81 MG: 81 TABLET, CHEWABLE ORAL at 09:38

## 2024-02-23 NOTE — PROGRESS NOTES
"Daily progress note    Primary care physician      Subjective  Doing fair with no new issues but still eating poorly    History of present illness  70-year-old white female with history of severe dementia TIA CVA osteoarthritis and chronic anemia sent to the ER at [facility] with complaint of generalized weakness and laboratory showed significant anemia.  Patient is poor historian but denies any abdominal pain nausea vomiting black stools or blood in the stools.  Patient is on Eliquis for history of DVT.  Patient workup in ER revealed symptomatic anemia admitted for transfusion and anemia workup.  Patient is DNR per her wishes.    REVIEW OF SYSTEMS  Unremarkable except weakness    PHYSICAL EXAM  Blood pressure 116/62, pulse 73, temperature 97.3 °F (36.3 °C), temperature source Oral, resp. rate 16, height 157.5 cm (62\"), weight 49.4 kg (109 lb), SpO2 98%.    Constitutional:       General: She is not in acute distress.     Appearance: Normal appearance. She is not ill-appearing or toxic-appearing.   HENT:      Head: Normocephalic and atraumatic.   Eyes:      Extraocular Movements: Extraocular movements intact.      Pupils: Pupils are equal, round, and reactive to light.   Cardiovascular:      Rate and Rhythm: Normal rate and regular rhythm.      Heart sounds: No murmur heard.     No friction rub. No gallop.   Pulmonary:      Effort: Pulmonary effort is normal.      Breath sounds: Normal breath sounds.   Abdominal:      General: Abdomen is flat. There is no distension.      Palpations: Abdomen is soft.      Tenderness: There is no abdominal tenderness.   Musculoskeletal:         General: No swelling or tenderness. Normal range of motion.      Cervical back: Normal range of motion and neck supple.   Skin:     General: Skin is warm and dry.      Coloration: Skin is pale.   Neurological:      General: No focal deficit present.      Mental Status: She is alert and oriented to person, place, and time.      " Sensory: No sensory deficit.      Motor: No weakness.   Psychiatric:         Mood and Affect: Mood normal.         Behavior: Behavior normal.      LAB RESULTS  Lab Results (last 24 hours)       Procedure Component Value Units Date/Time    Manual Differential [[id number]] Collected: 02/23/24 0504    Specimen: Blood Updated: 02/23/24 0605     Neutrophil % 72.2 %      Lymphocyte % 20.6 %      Monocyte % 6.2 %      Basophil % 1.0 %      Neutrophils Absolute 3.87 10*3/mm3      Lymphocytes Absolute 1.10 10*3/mm3      Monocytes Absolute 0.33 10*3/mm3      Basophils Absolute 0.05 10*3/mm3      Acanthocytes Slight/1+     Anisocytosis Mod/2+     Elliptocytes Slight/1+     Hypochromia Mod/2+     Microcytes Mod/2+     Poikilocytes Mod/2+     Polychromasia Slight/1+     Target Cells Slight/1+     Schistocytes Slight/1+     WBC Morphology Normal     Platelet Morphology Normal    Basic Metabolic Panel [[id number]]  (Abnormal) Collected: 02/23/24 0504    Specimen: Blood Updated: 02/23/24 0548     Glucose 88 mg/dL      BUN 11 mg/dL      Creatinine 0.58 mg/dL      Sodium 140 mmol/L      Potassium 4.1 mmol/L      Chloride 108 mmol/L      CO2 26.0 mmol/L      Calcium 8.8 mg/dL      BUN/Creatinine Ratio 19.0     Anion Gap 6.0 mmol/L      eGFR 80.9 mL/min/1.73     Narrative:      GFR Normal >60  Chronic Kidney Disease <60  Kidney Failure <15    The GFR formula is only valid for adults with stable renal function between ages 18 and 70.    CBC & Differential [[id number]]  (Abnormal) Collected: 02/23/24 0504    Specimen: Blood Updated: 02/23/24 0535    Narrative:      The following orders were created for panel order CBC & Differential.  Procedure                               Abnormality         Status                     ---------                               -----------         ------                     CBC Auto Differential[[id number]]        Abnormal            Final result                 Please view results for these tests on the  individual orders.    CBC Auto Differential [[id number]]  (Abnormal) Collected: 02/23/24 0504    Specimen: Blood Updated: 02/23/24 0535     WBC 5.36 10*3/mm3      RBC 4.37 10*6/mm3      Hemoglobin 8.9 g/dL      Hematocrit 31.2 %      MCV 71.4 fL      MCH 20.4 pg      MCHC 28.5 g/dL      RDW 23.2 %      RDW-SD 57.3 fl      MPV 9.8 fL      Platelets 278 10*3/mm3      nRBC 0.0 /100 WBC           Imaging Results (Last 24 Hours)       ** No results found for the last 24 hours. **            Current Facility-Administered Medications:     aspirin chewable tablet 81 mg, 81 mg, Oral, Daily, Jo Melchor MD, 81 mg at 02/23/24 0938    docusate sodium (COLACE) capsule 100 mg, 100 mg, Oral, BID, Jo Melchor MD, 100 mg at 02/23/24 0939    ferrous sulfate tablet 325 mg, 325 mg, Oral, Daily With Breakfast, Jo Melchor MD, 325 mg at 02/23/24 0939    galantamine (RAZADYNE) tablet 8 mg, 8 mg, Oral, Daily, Jo Melchor MD, 8 mg at 02/23/24 0939    melatonin tablet 3 mg, 3 mg, Oral, Nightly PRN, Jo Melchor MD    multivitamin with minerals 1 tablet, 1 tablet, Oral, Daily, Jo Melchor MD, 1 tablet at 02/23/24 0939    pantoprazole (PROTONIX) EC tablet 40 mg, 40 mg, Oral, BID AC, oJ Melchor MD, 40 mg at 02/23/24 0939    polyethylene glycol (MIRALAX) packet 17 g, 17 g, Oral, Daily, Jo Melchor MD, 17 g at 02/23/24 0939    [COMPLETED] Insert Peripheral IV, , , Once **AND** sodium chloride 0.9 % flush 10 mL, 10 mL, Intravenous, PRN, Farhan Dorsey MD    traZODone (DESYREL) tablet 50 mg, 50 mg, Oral, Daily, Jo Melchor MD, 50 mg at 02/23/24 0939     ASSESSMENT  Symptomatic anemia  Acute on chronic anemia  Severe dementia  Osteoarthritis  Depression    PLAN  CPM  Iron supplement  Encourage p.o. intake  Appetite stimulant  Adjust nursing home medications  Stress ulcer DVT prophylaxis  Supportive care  PT OT  DNR and palliative care consult  Discussed with nursing staff  Discharge planning    JO MELCHOR MD    Copied text in  this note has been reviewed and is accurate as of 02/23/24

## 2024-02-23 NOTE — CASE MANAGEMENT/SOCIAL WORK
Continued Stay Note  [facility]     Patient Name: Marcella Stephens  MRN: [id number]  Today's Date: 2/23/2024    Admit Date: 2/21/2024    Plan: Return back to [facility] via ems   Discharge Plan       Row Name 02/23/24 1255       Plan    Plan Return back to [facility] via ems    Patient/Family in Agreement with Plan yes    Plan Comments CCP called dtr Bonita [phone], introduced self and explained CCP role. She confirms she is pts HCS and document on file. She confirms pt will return back to [facility] at [place] and will need ems. Bonita currently is in [facility]. Spoke with Bud/Pricila pt is from [facility] private pay with private pay bed hold. Bed available over the weekend. Explained possible dc over the weekend. Packet in rey. [place] RITTER/CCP                   Discharge Codes    No documentation.                 Expected Discharge Date and Time       Expected Discharge Date Expected Discharge Time    Feb 24, 2024               Rosario Amaya RN

## 2024-02-24 LAB
ANION GAP SERPL CALCULATED.3IONS-SCNC: 7 MMOL/L (ref 5–15)
BASOPHILS # BLD AUTO: 0.04 10*3/MM3 (ref 0–0.2)
BASOPHILS NFR BLD AUTO: 0.7 % (ref 0–1.5)
BUN SERPL-MCNC: 10 MG/DL (ref 8–23)
BUN/CREAT SERPL: 22.2 (ref 7–25)
CALCIUM SPEC-SCNC: 8.6 MG/DL (ref 8.2–9.6)
CHLORIDE SERPL-SCNC: 109 MMOL/L (ref 98–107)
CO2 SERPL-SCNC: 26 MMOL/L (ref 22–29)
CREAT SERPL-MCNC: 0.45 MG/DL (ref 0.57–1)
DEPRECATED RDW RBC AUTO: 60.2 FL (ref 37–54)
EGFRCR SERPLBLD CKD-EPI 2021: 86 ML/MIN/1.73
EOSINOPHIL # BLD AUTO: 0.21 10*3/MM3 (ref 0–0.4)
EOSINOPHIL NFR BLD AUTO: 3.6 % (ref 0.3–6.2)
ERYTHROCYTE [DISTWIDTH] IN BLOOD BY AUTOMATED COUNT: 24 % (ref 12.3–15.4)
GLUCOSE SERPL-MCNC: 93 MG/DL (ref 65–99)
HCT VFR BLD AUTO: 32.5 % (ref 34–46.6)
HGB BLD-MCNC: 9.5 G/DL (ref 12–15.9)
LYMPHOCYTES # BLD AUTO: 1.99 10*3/MM3 (ref 0.7–3.1)
LYMPHOCYTES NFR BLD AUTO: 33.9 % (ref 19.6–45.3)
MCH RBC QN AUTO: 21 PG (ref 26.6–33)
MCHC RBC AUTO-ENTMCNC: 29.2 G/DL (ref 31.5–35.7)
MCV RBC AUTO: 71.9 FL (ref 79–97)
MONOCYTES # BLD AUTO: 0.61 10*3/MM3 (ref 0.1–0.9)
MONOCYTES NFR BLD AUTO: 10.4 % (ref 5–12)
NEUTROPHILS NFR BLD AUTO: 3.01 10*3/MM3 (ref 1.7–7)
NEUTROPHILS NFR BLD AUTO: 51.2 % (ref 42.7–76)
PLATELET # BLD AUTO: 280 10*3/MM3 (ref 140–450)
PMV BLD AUTO: 9.6 FL (ref 6–12)
POTASSIUM SERPL-SCNC: 4.2 MMOL/L (ref 3.5–5.2)
RBC # BLD AUTO: 4.52 10*6/MM3 (ref 3.77–5.28)
SODIUM SERPL-SCNC: 142 MMOL/L (ref 136–145)
WBC NRBC COR # BLD AUTO: 5.87 10*3/MM3 (ref 3.4–10.8)

## 2024-02-24 PROCEDURE — 85025 COMPLETE CBC W/AUTO DIFF WBC: CPT | Performed by: HOSPITALIST

## 2024-02-24 PROCEDURE — 80048 BASIC METABOLIC PNL TOTAL CA: CPT | Performed by: HOSPITALIST

## 2024-02-24 PROCEDURE — G0378 HOSPITAL OBSERVATION PER HR: HCPCS

## 2024-02-24 RX ADMIN — DOCUSATE SODIUM 100 MG: 100 CAPSULE, LIQUID FILLED ORAL at 20:00

## 2024-02-24 RX ADMIN — MIRTAZAPINE 15 MG: 15 TABLET, FILM COATED ORAL at 20:00

## 2024-02-24 RX ADMIN — POLYETHYLENE GLYCOL 3350 17 G: 17 POWDER, FOR SOLUTION ORAL at 09:49

## 2024-02-24 RX ADMIN — ASPIRIN 81 MG: 81 TABLET, CHEWABLE ORAL at 09:49

## 2024-02-24 RX ADMIN — Medication 1 TABLET: at 09:49

## 2024-02-24 RX ADMIN — PANTOPRAZOLE SODIUM 40 MG: 40 TABLET, DELAYED RELEASE ORAL at 06:30

## 2024-02-24 RX ADMIN — FERROUS SULFATE TAB 325 MG (65 MG ELEMENTAL FE) 325 MG: 325 (65 FE) TAB at 09:50

## 2024-02-24 RX ADMIN — DOCUSATE SODIUM 100 MG: 100 CAPSULE, LIQUID FILLED ORAL at 09:49

## 2024-02-24 RX ADMIN — MEGESTROL ACETATE 400 MG: 400 SUSPENSION ORAL at 09:50

## 2024-02-24 RX ADMIN — TRAZODONE HYDROCHLORIDE 50 MG: 50 TABLET ORAL at 09:49

## 2024-02-24 RX ADMIN — GALANTAMINE 8 MG: 4 TABLET, FILM COATED ORAL at 09:49

## 2024-02-24 RX ADMIN — PANTOPRAZOLE SODIUM 40 MG: 40 TABLET, DELAYED RELEASE ORAL at 17:43

## 2024-02-24 NOTE — PROGRESS NOTES
"Daily progress note    Primary care physician      Subjective  Awake and alert and eating better with no new complaints    History of present illness  70-year-old white female with history of severe dementia TIA CVA osteoarthritis and chronic anemia sent to the ER at [facility] with complaint of generalized weakness and laboratory showed significant anemia.  Patient is poor historian but denies any abdominal pain nausea vomiting black stools or blood in the stools.  Patient is on Eliquis for history of DVT.  Patient workup in ER revealed symptomatic anemia admitted for transfusion and anemia workup.  Patient is DNR per her wishes.    REVIEW OF SYSTEMS  Unremarkable except weakness    PHYSICAL EXAM  Blood pressure 90/51, pulse 78, temperature 98.1 °F (36.7 °C), temperature source Oral, resp. rate 18, height 157.5 cm (62\"), weight 49.4 kg (109 lb), SpO2 99%.    Constitutional:       General: She is not in acute distress.     Appearance: Normal appearance. She is not ill-appearing or toxic-appearing.   HENT:      Head: Normocephalic and atraumatic.   Eyes:      Extraocular Movements: Extraocular movements intact.      Pupils: Pupils are equal, round, and reactive to light.   Cardiovascular:      Rate and Rhythm: Normal rate and regular rhythm.      Heart sounds: No murmur heard.     No friction rub. No gallop.   Pulmonary:      Effort: Pulmonary effort is normal.      Breath sounds: Normal breath sounds.   Abdominal:      General: Abdomen is flat. There is no distension.      Palpations: Abdomen is soft.      Tenderness: There is no abdominal tenderness.   Musculoskeletal:         General: No swelling or tenderness. Normal range of motion.      Cervical back: Normal range of motion and neck supple.   Skin:     General: Skin is warm and dry.      Coloration: Skin is pale.   Neurological:      General: No focal deficit present.      Mental Status: She is alert and oriented to person, place, and time.    "   Sensory: No sensory deficit.      Motor: No weakness.   Psychiatric:         Mood and Affect: Mood normal.         Behavior: Behavior normal.      LAB RESULTS  Lab Results (last 24 hours)       Procedure Component Value Units Date/Time    CBC & Differential [[id number]]  (Abnormal) Collected: 02/24/24 0623    Specimen: Blood Updated: 02/24/24 0722    Narrative:      The following orders were created for panel order CBC & Differential.  Procedure                               Abnormality         Status                     ---------                               -----------         ------                     CBC Auto Differential[[id number]]        Abnormal            Final result                 Please view results for these tests on the individual orders.    CBC Auto Differential [[id number]]  (Abnormal) Collected: 02/24/24 0623    Specimen: Blood Updated: 02/24/24 0722     WBC 5.87 10*3/mm3      RBC 4.52 10*6/mm3      Hemoglobin 9.5 g/dL      Hematocrit 32.5 %      MCV 71.9 fL      MCH 21.0 pg      MCHC 29.2 g/dL      RDW 24.0 %      RDW-SD 60.2 fl      MPV 9.6 fL      Platelets 280 10*3/mm3      Neutrophil % 51.2 %      Lymphocyte % 33.9 %      Monocyte % 10.4 %      Eosinophil % 3.6 %      Basophil % 0.7 %      Neutrophils, Absolute 3.01 10*3/mm3      Lymphocytes, Absolute 1.99 10*3/mm3      Monocytes, Absolute 0.61 10*3/mm3      Eosinophils, Absolute 0.21 10*3/mm3      Basophils, Absolute 0.04 10*3/mm3     Basic Metabolic Panel [[id number]]  (Abnormal) Collected: 02/24/24 0623    Specimen: Blood Updated: 02/24/24 0717     Glucose 93 mg/dL      BUN 10 mg/dL      Creatinine 0.45 mg/dL      Sodium 142 mmol/L      Potassium 4.2 mmol/L      Chloride 109 mmol/L      CO2 26.0 mmol/L      Calcium 8.6 mg/dL      BUN/Creatinine Ratio 22.2     Anion Gap 7.0 mmol/L      eGFR 86.0 mL/min/1.73     Narrative:      GFR Normal >60  Chronic Kidney Disease <60  Kidney Failure <15    The GFR formula is only valid for adults with  stable renal function between ages 18 and 70.          Imaging Results (Last 24 Hours)       ** No results found for the last 24 hours. **            Current Facility-Administered Medications:     aspirin chewable tablet 81 mg, 81 mg, Oral, Daily, Jo Melchor MD, 81 mg at 02/24/24 0949    docusate sodium (COLACE) capsule 100 mg, 100 mg, Oral, BID, Jo Melchor MD, 100 mg at 02/24/24 0949    ferrous sulfate tablet 325 mg, 325 mg, Oral, Daily With Breakfast, Jo Melchor MD, 325 mg at 02/24/24 0950    galantamine (RAZADYNE) tablet 8 mg, 8 mg, Oral, Daily, Jo Melchor MD, 8 mg at 02/24/24 0949    megestrol (MEGACE) 40 MG/ML suspension 400 mg, 400 mg, Oral, Daily, Jo Melchor MD, 400 mg at 02/24/24 0950    melatonin tablet 3 mg, 3 mg, Oral, Nightly PRN, Jo Melchor MD    mirtazapine (REMERON) tablet 15 mg, 15 mg, Oral, Nightly, Jo Melchor MD, 15 mg at 02/23/24 2123    multivitamin with minerals 1 tablet, 1 tablet, Oral, Daily, Jo Melchor MD, 1 tablet at 02/24/24 0949    pantoprazole (PROTONIX) EC tablet 40 mg, 40 mg, Oral, BID AC, Jo Melchor MD, 40 mg at 02/24/24 0630    polyethylene glycol (MIRALAX) packet 17 g, 17 g, Oral, Daily, Jo Melchor MD, 17 g at 02/24/24 0949    [COMPLETED] Insert Peripheral IV, , , Once **AND** sodium chloride 0.9 % flush 10 mL, 10 mL, Intravenous, PRN, Farhan Dorsey MD    traZODone (DESYREL) tablet 50 mg, 50 mg, Oral, Daily, Jo Melchor MD, 50 mg at 02/24/24 0949     ASSESSMENT  Symptomatic anemia  Acute on chronic anemia  Severe dementia  Osteoarthritis  Depression    PLAN  CPM  Encourage p.o. intake  Continue appetite stimulant  Adjust nursing home medications  Stress ulcer DVT prophylaxis  Supportive care  PT OT  DNR and palliative care consult pending  Discussed with nursing staff  Discharge planning    JO MELCHOR MD    Copied text in this note has been reviewed and is accurate as of 02/24/24

## 2024-02-24 NOTE — PLAN OF CARE
Goal Outcome Evaluation:      Vitals stable. No c/o pain. Diet advanced. Family at bedside for part of the day.

## 2024-02-25 LAB
ANION GAP SERPL CALCULATED.3IONS-SCNC: 5 MMOL/L (ref 5–15)
BASOPHILS # BLD AUTO: 0.03 10*3/MM3 (ref 0–0.2)
BASOPHILS NFR BLD AUTO: 0.5 % (ref 0–1.5)
BUN SERPL-MCNC: 12 MG/DL (ref 8–23)
BUN/CREAT SERPL: 18.8 (ref 7–25)
CALCIUM SPEC-SCNC: 8.7 MG/DL (ref 8.2–9.6)
CHLORIDE SERPL-SCNC: 108 MMOL/L (ref 98–107)
CO2 SERPL-SCNC: 27 MMOL/L (ref 22–29)
CREAT SERPL-MCNC: 0.64 MG/DL (ref 0.57–1)
DEPRECATED RDW RBC AUTO: 60.2 FL (ref 37–54)
EGFRCR SERPLBLD CKD-EPI 2021: 79 ML/MIN/1.73
EOSINOPHIL # BLD AUTO: 0.23 10*3/MM3 (ref 0–0.4)
EOSINOPHIL NFR BLD AUTO: 3.7 % (ref 0.3–6.2)
ERYTHROCYTE [DISTWIDTH] IN BLOOD BY AUTOMATED COUNT: 24.3 % (ref 12.3–15.4)
GEN 5 2HR TROPONIN T REFLEX: 44 NG/L
GLUCOSE SERPL-MCNC: 94 MG/DL (ref 65–99)
HCT VFR BLD AUTO: 28.8 % (ref 34–46.6)
HGB BLD-MCNC: 8.2 G/DL (ref 12–15.9)
LYMPHOCYTES # BLD AUTO: 2.12 10*3/MM3 (ref 0.7–3.1)
LYMPHOCYTES NFR BLD AUTO: 34.5 % (ref 19.6–45.3)
MAGNESIUM SERPL-MCNC: 2.4 MG/DL (ref 1.7–2.3)
MCH RBC QN AUTO: 20.2 PG (ref 26.6–33)
MCHC RBC AUTO-ENTMCNC: 28.5 G/DL (ref 31.5–35.7)
MCV RBC AUTO: 71.1 FL (ref 79–97)
MONOCYTES # BLD AUTO: 0.64 10*3/MM3 (ref 0.1–0.9)
MONOCYTES NFR BLD AUTO: 10.4 % (ref 5–12)
NEUTROPHILS NFR BLD AUTO: 3.1 10*3/MM3 (ref 1.7–7)
NEUTROPHILS NFR BLD AUTO: 50.4 % (ref 42.7–76)
PLATELET # BLD AUTO: 289 10*3/MM3 (ref 140–450)
PMV BLD AUTO: 9.8 FL (ref 6–12)
POTASSIUM SERPL-SCNC: 4 MMOL/L (ref 3.5–5.2)
QT INTERVAL: 414 MS
QTC INTERVAL: 444 MS
RBC # BLD AUTO: 4.05 10*6/MM3 (ref 3.77–5.28)
SODIUM SERPL-SCNC: 140 MMOL/L (ref 136–145)
TROPONIN T DELTA: -1 NG/L
TROPONIN T SERPL HS-MCNC: 45 NG/L
WBC NRBC COR # BLD AUTO: 6.15 10*3/MM3 (ref 3.4–10.8)

## 2024-02-25 PROCEDURE — 80048 BASIC METABOLIC PNL TOTAL CA: CPT | Performed by: HOSPITALIST

## 2024-02-25 PROCEDURE — 93005 ELECTROCARDIOGRAM TRACING: CPT | Performed by: HOSPITALIST

## 2024-02-25 PROCEDURE — 93010 ELECTROCARDIOGRAM REPORT: CPT | Performed by: INTERNAL MEDICINE

## 2024-02-25 PROCEDURE — G0378 HOSPITAL OBSERVATION PER HR: HCPCS

## 2024-02-25 PROCEDURE — 84484 ASSAY OF TROPONIN QUANT: CPT | Performed by: HOSPITALIST

## 2024-02-25 PROCEDURE — 85025 COMPLETE CBC W/AUTO DIFF WBC: CPT | Performed by: HOSPITALIST

## 2024-02-25 PROCEDURE — 83735 ASSAY OF MAGNESIUM: CPT | Performed by: HOSPITALIST

## 2024-02-25 RX ORDER — BISACODYL 5 MG/1
10 TABLET, DELAYED RELEASE ORAL DAILY PRN
Status: DISCONTINUED | OUTPATIENT
Start: 2024-02-25 | End: 2024-02-26

## 2024-02-25 RX ADMIN — Medication 1 TABLET: at 09:00

## 2024-02-25 RX ADMIN — DOCUSATE SODIUM 100 MG: 100 CAPSULE, LIQUID FILLED ORAL at 20:18

## 2024-02-25 RX ADMIN — POLYETHYLENE GLYCOL 3350 17 G: 17 POWDER, FOR SOLUTION ORAL at 09:00

## 2024-02-25 RX ADMIN — PANTOPRAZOLE SODIUM 40 MG: 40 TABLET, DELAYED RELEASE ORAL at 06:04

## 2024-02-25 RX ADMIN — GALANTAMINE 8 MG: 4 TABLET, FILM COATED ORAL at 09:00

## 2024-02-25 RX ADMIN — PANTOPRAZOLE SODIUM 40 MG: 40 TABLET, DELAYED RELEASE ORAL at 16:26

## 2024-02-25 RX ADMIN — TRAZODONE HYDROCHLORIDE 50 MG: 50 TABLET ORAL at 09:00

## 2024-02-25 RX ADMIN — FERROUS SULFATE TAB 325 MG (65 MG ELEMENTAL FE) 325 MG: 325 (65 FE) TAB at 09:00

## 2024-02-25 RX ADMIN — ASPIRIN 81 MG: 81 TABLET, CHEWABLE ORAL at 09:00

## 2024-02-25 RX ADMIN — MEGESTROL ACETATE 400 MG: 400 SUSPENSION ORAL at 08:59

## 2024-02-25 RX ADMIN — MIRTAZAPINE 15 MG: 15 TABLET, FILM COATED ORAL at 20:18

## 2024-02-25 RX ADMIN — DOCUSATE SODIUM 100 MG: 100 CAPSULE, LIQUID FILLED ORAL at 09:00

## 2024-02-25 NOTE — PLAN OF CARE
Goal Outcome Evaluation:      Vitals stable. C/o chest pain this morning- esophageal. Troponins ordered. Patient also being assisted with meals- noted that patient has been throwing up after every meal. Dr. Jill idng. ST consulted.

## 2024-02-25 NOTE — PLAN OF CARE
Problem: Fall Injury Risk  Goal: Absence of Fall and Fall-Related Injury  Outcome: Ongoing, Progressing  Intervention: Identify and Manage Contributors  Recent Flowsheet Documentation  Taken 2/24/2024 1956 by Pati Colbert RN  Medication Review/Management: medications reviewed  Intervention: Promote Injury-Free Environment  Recent Flowsheet Documentation  Taken 2/25/2024 0400 by Pati Colbert RN  Safety Promotion/Fall Prevention:   activity supervised   assistive device/personal items within reach   fall prevention program maintained  Taken 2/25/2024 0200 by Pati Colbert RN  Safety Promotion/Fall Prevention:   activity supervised   assistive device/personal items within reach   fall prevention program maintained  Taken 2/25/2024 0000 by Pati Colbert RN  Safety Promotion/Fall Prevention:   activity supervised   assistive device/personal items within reach   fall prevention program maintained  Taken 2/24/2024 1956 by Pati Colbert RN  Safety Promotion/Fall Prevention:   activity supervised   assistive device/personal items within reach   fall prevention program maintained   Goal Outcome Evaluation:  Plan of Care Reviewed With: patient        Progress: no change

## 2024-02-25 NOTE — PROGRESS NOTES
"Daily progress note    Primary care physician      Subjective  Doing same with no new issues and making slow progress    History of present illness  70-year-old white female with history of severe dementia TIA CVA osteoarthritis and chronic anemia sent to the ER at [facility] with complaint of generalized weakness and laboratory showed significant anemia.  Patient is poor historian but denies any abdominal pain nausea vomiting black stools or blood in the stools.  Patient is on Eliquis for history of DVT.  Patient workup in ER revealed symptomatic anemia admitted for transfusion and anemia workup.  Patient is DNR per her wishes.    REVIEW OF SYSTEMS  Unremarkable except weakness    PHYSICAL EXAM  Blood pressure 105/60, pulse 89, temperature 97.3 °F (36.3 °C), temperature source Oral, resp. rate 16, height 157.5 cm (62\"), weight 49.4 kg (109 lb), SpO2 94%.    Constitutional:       General: She is not in acute distress.     Appearance: Normal appearance. She is not ill-appearing or toxic-appearing.   HENT:      Head: Normocephalic and atraumatic.   Eyes:      Extraocular Movements: Extraocular movements intact.      Pupils: Pupils are equal, round, and reactive to light.   Cardiovascular:      Rate and Rhythm: Normal rate and regular rhythm.      Heart sounds: No murmur heard.     No friction rub. No gallop.   Pulmonary:      Effort: Pulmonary effort is normal.      Breath sounds: Normal breath sounds.   Abdominal:      General: Abdomen is flat. There is no distension.      Palpations: Abdomen is soft.      Tenderness: There is no abdominal tenderness.   Musculoskeletal:         General: No swelling or tenderness. Normal range of motion.      Cervical back: Normal range of motion and neck supple.   Skin:     General: Skin is warm and dry.      Coloration: Skin is pale.   Neurological:      General: No focal deficit present.      Mental Status: She is alert and oriented to person, place, and time.     "  Sensory: No sensory deficit.      Motor: No weakness.   Psychiatric:         Mood and Affect: Mood normal.         Behavior: Behavior normal.      LAB RESULTS  Lab Results (last 24 hours)       Procedure Component Value Units Date/Time    High Sensitivity Troponin T 2Hr [[id number]]  (Abnormal) Collected: 02/25/24 1113    Specimen: Blood Updated: 02/25/24 1210     HS Troponin T 44 ng/L      Troponin T Delta -1 ng/L     Narrative:      High Sensitive Troponin T Reference Range:  <14.0 ng/L- Negative Female for AMI  <22.0 ng/L- Negative Male for AMI  >=14 - Abnormal Female indicating possible myocardial injury.  >=22 - Abnormal Male indicating possible myocardial injury.   Clinicians would have to utilize clinical acumen, EKG, Troponin, and serial changes to determine if it is an Acute Myocardial Infarction or myocardial injury due to an underlying chronic condition.         High Sensitivity Troponin T [[id number]]  (Abnormal) Collected: 02/25/24 0921    Specimen: Blood Updated: 02/25/24 0949     HS Troponin T 45 ng/L     Narrative:      High Sensitive Troponin T Reference Range:  <14.0 ng/L- Negative Female for AMI  <22.0 ng/L- Negative Male for AMI  >=14 - Abnormal Female indicating possible myocardial injury.  >=22 - Abnormal Male indicating possible myocardial injury.   Clinicians would have to utilize clinical acumen, EKG, Troponin, and serial changes to determine if it is an Acute Myocardial Infarction or myocardial injury due to an underlying chronic condition.         Magnesium [[id number]]  (Abnormal) Collected: 02/25/24 0921    Specimen: Blood Updated: 02/25/24 0944     Magnesium 2.4 mg/dL     Basic Metabolic Panel [[id number]]  (Abnormal) Collected: 02/25/24 0418    Specimen: Blood Updated: 02/25/24 0507     Glucose 94 mg/dL      BUN 12 mg/dL      Creatinine 0.64 mg/dL      Sodium 140 mmol/L      Potassium 4.0 mmol/L      Chloride 108 mmol/L      CO2 27.0 mmol/L      Calcium 8.7 mg/dL      BUN/Creatinine Ratio  18.8     Anion Gap 5.0 mmol/L      eGFR 79.0 mL/min/1.73     Narrative:      GFR Normal >60  Chronic Kidney Disease <60  Kidney Failure <15    The GFR formula is only valid for adults with stable renal function between ages 18 and 70.    CBC & Differential [[id number]]  (Abnormal) Collected: 02/25/24 0418    Specimen: Blood Updated: 02/25/24 0450    Narrative:      The following orders were created for panel order CBC & Differential.  Procedure                               Abnormality         Status                     ---------                               -----------         ------                     CBC Auto Differential[[id number]]        Abnormal            Final result                 Please view results for these tests on the individual orders.    CBC Auto Differential [[id number]]  (Abnormal) Collected: 02/25/24 0418    Specimen: Blood Updated: 02/25/24 0450     WBC 6.15 10*3/mm3      RBC 4.05 10*6/mm3      Hemoglobin 8.2 g/dL      Hematocrit 28.8 %      MCV 71.1 fL      MCH 20.2 pg      MCHC 28.5 g/dL      RDW 24.3 %      RDW-SD 60.2 fl      MPV 9.8 fL      Platelets 289 10*3/mm3      Neutrophil % 50.4 %      Lymphocyte % 34.5 %      Monocyte % 10.4 %      Eosinophil % 3.7 %      Basophil % 0.5 %      Neutrophils, Absolute 3.10 10*3/mm3      Lymphocytes, Absolute 2.12 10*3/mm3      Monocytes, Absolute 0.64 10*3/mm3      Eosinophils, Absolute 0.23 10*3/mm3      Basophils, Absolute 0.03 10*3/mm3           Imaging Results (Last 24 Hours)       ** No results found for the last 24 hours. **            Current Facility-Administered Medications:     aspirin chewable tablet 81 mg, 81 mg, Oral, Daily, Delio eMlchor MD, 81 mg at 02/25/24 0900    docusate sodium (COLACE) capsule 100 mg, 100 mg, Oral, BID, Delio Melchor MD, 100 mg at 02/25/24 0900    ferrous sulfate tablet 325 mg, 325 mg, Oral, Daily With Breakfast, Delio Melchor MD, 325 mg at 02/25/24 0900    galantamine (RAZADYNE) tablet 8 mg, 8 mg, Oral, Daily,  Jo Melchor MD, 8 mg at 02/25/24 0900    megestrol (MEGACE) 40 MG/ML suspension 400 mg, 400 mg, Oral, Daily, Jo Melchor MD, 400 mg at 02/25/24 0859    melatonin tablet 3 mg, 3 mg, Oral, Nightly PRN, Jo Melchor MD    mirtazapine (REMERON) tablet 15 mg, 15 mg, Oral, Nightly, Jo Melchor MD, 15 mg at 02/24/24 2000    multivitamin with minerals 1 tablet, 1 tablet, Oral, Daily, Jo Melchor MD, 1 tablet at 02/25/24 0900    pantoprazole (PROTONIX) EC tablet 40 mg, 40 mg, Oral, BID AC, Jo Melchor MD, 40 mg at 02/25/24 0604    polyethylene glycol (MIRALAX) packet 17 g, 17 g, Oral, Daily, Jo Melchor MD, 17 g at 02/25/24 0900    [COMPLETED] Insert Peripheral IV, , , Once **AND** sodium chloride 0.9 % flush 10 mL, 10 mL, Intravenous, PRN, Farhan Dorsey MD    traZODone (DESYREL) tablet 50 mg, 50 mg, Oral, Daily, Jo Melchor MD, 50 mg at 02/25/24 0900     ASSESSMENT  Symptomatic anemia  Acute on chronic anemia  Severe dementia  Osteoarthritis  Depression    PLAN  CPM  Encourage p.o. intake  Continue appetite stimulant  Adjust nursing home medications  Stress ulcer DVT prophylaxis  Supportive care  PT OT  DNR and palliative care consult pending  Discussed with nursing staff  Discharge planning    JO MELCHOR MD    Copied text in this note has been reviewed and is accurate as of 02/25/24

## 2024-02-26 VITALS
HEART RATE: 88 BPM | HEIGHT: 62 IN | DIASTOLIC BLOOD PRESSURE: 64 MMHG | BODY MASS INDEX: 20.06 KG/M2 | SYSTOLIC BLOOD PRESSURE: 118 MMHG | OXYGEN SATURATION: 96 % | WEIGHT: 109 LBS | RESPIRATION RATE: 16 BRPM | TEMPERATURE: 98.6 F

## 2024-02-26 LAB
BASOPHILS # BLD AUTO: 0.08 10*3/MM3 (ref 0–0.2)
BASOPHILS NFR BLD AUTO: 1.2 % (ref 0–1.5)
DEPRECATED RDW RBC AUTO: 63.1 FL (ref 37–54)
EOSINOPHIL # BLD AUTO: 0.32 10*3/MM3 (ref 0–0.4)
EOSINOPHIL NFR BLD AUTO: 4.8 % (ref 0.3–6.2)
ERYTHROCYTE [DISTWIDTH] IN BLOOD BY AUTOMATED COUNT: 24.6 % (ref 12.3–15.4)
HCT VFR BLD AUTO: 31.2 % (ref 34–46.6)
HGB BLD-MCNC: 8.9 G/DL (ref 12–15.9)
LYMPHOCYTES # BLD AUTO: 2.18 10*3/MM3 (ref 0.7–3.1)
LYMPHOCYTES NFR BLD AUTO: 32.4 % (ref 19.6–45.3)
MCH RBC QN AUTO: 20.9 PG (ref 26.6–33)
MCHC RBC AUTO-ENTMCNC: 28.5 G/DL (ref 31.5–35.7)
MCV RBC AUTO: 73.4 FL (ref 79–97)
MONOCYTES # BLD AUTO: 0.74 10*3/MM3 (ref 0.1–0.9)
MONOCYTES NFR BLD AUTO: 11 % (ref 5–12)
NEUTROPHILS NFR BLD AUTO: 3.39 10*3/MM3 (ref 1.7–7)
NEUTROPHILS NFR BLD AUTO: 50.3 % (ref 42.7–76)
PLATELET # BLD AUTO: 247 10*3/MM3 (ref 140–450)
PMV BLD AUTO: 9.3 FL (ref 6–12)
RBC # BLD AUTO: 4.25 10*6/MM3 (ref 3.77–5.28)
TROPONIN T SERPL HS-MCNC: 45 NG/L
WBC NRBC COR # BLD AUTO: 6.73 10*3/MM3 (ref 3.4–10.8)

## 2024-02-26 PROCEDURE — 85025 COMPLETE CBC W/AUTO DIFF WBC: CPT | Performed by: HOSPITALIST

## 2024-02-26 PROCEDURE — G0378 HOSPITAL OBSERVATION PER HR: HCPCS

## 2024-02-26 PROCEDURE — 84484 ASSAY OF TROPONIN QUANT: CPT | Performed by: HOSPITALIST

## 2024-02-26 PROCEDURE — 97530 THERAPEUTIC ACTIVITIES: CPT

## 2024-02-26 PROCEDURE — 97110 THERAPEUTIC EXERCISES: CPT

## 2024-02-26 PROCEDURE — 97535 SELF CARE MNGMENT TRAINING: CPT

## 2024-02-26 RX ORDER — FERROUS SULFATE 325(65) MG
325 TABLET ORAL
Qty: 30 TABLET | Refills: 0 | Status: SHIPPED | OUTPATIENT
Start: 2024-02-27 | End: 2024-03-28

## 2024-02-26 RX ORDER — MIRTAZAPINE 15 MG/1
15 TABLET, FILM COATED ORAL NIGHTLY
Qty: 30 TABLET | Refills: 0 | Status: SHIPPED | OUTPATIENT
Start: 2024-02-26 | End: 2024-03-27

## 2024-02-26 RX ORDER — PANTOPRAZOLE SODIUM 40 MG/1
40 TABLET, DELAYED RELEASE ORAL
Qty: 60 TABLET | Refills: 0 | Status: SHIPPED | OUTPATIENT
Start: 2024-02-26 | End: 2024-03-27

## 2024-02-26 RX ADMIN — PANTOPRAZOLE SODIUM 40 MG: 40 TABLET, DELAYED RELEASE ORAL at 06:24

## 2024-02-26 RX ADMIN — POLYETHYLENE GLYCOL 3350 17 G: 17 POWDER, FOR SOLUTION ORAL at 09:53

## 2024-02-26 RX ADMIN — ASPIRIN 81 MG: 81 TABLET, CHEWABLE ORAL at 09:53

## 2024-02-26 RX ADMIN — TRAZODONE HYDROCHLORIDE 50 MG: 50 TABLET ORAL at 09:54

## 2024-02-26 RX ADMIN — FERROUS SULFATE TAB 325 MG (65 MG ELEMENTAL FE) 325 MG: 325 (65 FE) TAB at 09:54

## 2024-02-26 RX ADMIN — GALANTAMINE 8 MG: 4 TABLET, FILM COATED ORAL at 09:54

## 2024-02-26 RX ADMIN — DOCUSATE SODIUM 100 MG: 100 CAPSULE, LIQUID FILLED ORAL at 09:53

## 2024-02-26 NOTE — THERAPY TREATMENT NOTE
Patient Name: Marcella Stephens  : 1923    MRN: [id number]                              Today's Date: 2024       Admit Date: 2024    Visit Dx:     ICD-10-CM ICD-9-CM   1. Acute anemia  D64.9 285.9   2. Generalized weakness  R53.1 780.79     Patient Active Problem List   Diagnosis    Closed fracture of right hip    Fall as cause of accidental injury at home as place of occurrence    Alzheimer's dementia    Anemia    Vitamin D deficiency    Weakness of right upper extremity    Acute retention of urine    Hydronephrosis    Acute deep vein thrombosis (DVT) of brachial vein of right upper extremity    Recent cerebrovascular accident (CVA)    DVT of lower extremity, bilateral    Acute UTI    History of DVT (deep vein thrombosis)    Age-related physical debility    Hiatal hernia    Acute anemia    Symptomatic anemia     Past Medical History:   Diagnosis Date    Acute embolism and thombos unsp deep veins of low extrm, bi     Dysphagia     oropharyngeal phase    Prediabetes     Stroke     TIA (transient ischemic attack)     Urinary tract infection     Vascular dementia without behavioral disturbance      Past Surgical History:   Procedure Laterality Date    BACK SURGERY      FEMUR IM NAILING/RODDING Right 2021    Procedure: FEMUR INTRAMEDULLARY NAILING/RODDING;  Surgeon: Louis Scruggs MD;  Location: [facility];  Service: Orthopedics;  Laterality: Right;    KNEE SURGERY        General Information       Row Name 24 1234          OT Time and Intention    Document Type therapy note (daily note)  -CE     Mode of Treatment occupational therapy  -CE       Row Name 24 1234          General Information    Patient Profile Reviewed yes  -CE     Existing Precautions/Restrictions fall  -CE       Row Name 24 1234          Cognition    Orientation Status (Cognition) oriented to;person  -CE       Row Name 24 1234          Safety Issues, Functional Mobility    Safety Issues Affecting  Function (Mobility) insight into deficits/self-awareness  -CE     Impairments Affecting Function (Mobility) balance;endurance/activity tolerance;strength;cognition  -CE     Cognitive Impairments, Mobility Safety/Performance insight into deficits/self-awareness;judgment;problem-solving/reasoning;safety precaution follow-through  -CE               User Key  (r) = Recorded By, (t) = Taken By, (c) = Cosigned By      Initials Name Provider Type    CE Elizabeth Mayen OT Occupational Therapist                     Mobility/ADL's       Row Name 02/26/24 1235          Bed Mobility    Bed Mobility supine-sit  -CE     Sit-Supine [place] (Bed Mobility) minimum assist (75% patient effort);1 person assist  -CE     Assistive Device (Bed Mobility) bed rails;head of bed elevated  -CE       Row Name 02/26/24 1235          Transfers    Transfers sit-stand transfer;bed-chair transfer  -CE       Row Name 02/26/24 1235          Bed-Chair Transfer    Bed-Chair [place] (Transfers) 1 person assist;moderate assist (50% patient effort)  -CE     Comment, (Bed-Chair Transfer) B HHA for stand-pivot transfer to chair towards R side  -CE       Row Name 02/26/24 1235          Sit-Stand Transfer    Sit-Stand [place] (Transfers) moderate assist (50% patient effort);1 person assist;verbal cues  -CE     Comment, (Sit-Stand Transfer) B HHA with posterior lean  -CE       Row Name 02/26/24 1235          Activities of Daily Living    BADL Assessment/Intervention grooming  -CE       Row Name 02/26/24 1235          Grooming Assessment/Training    [place] Level (Grooming) standby assist;set up;hair care, combing/brushing  -CE     Position (Grooming) supported sitting  -CE               User Key  (r) = Recorded By, (t) = Taken By, (c) = Cosigned By      Initials Name Provider Type    CE Elizabeth Mayen OT Occupational Therapist                   Obj/Interventions       Row Name 02/26/24 1237          Balance    Balance Assessment  standing static balance  -CE     Static Standing Balance 1-person assist;moderate assist  posterior lean  -CE               User Key  (r) = Recorded By, (t) = Taken By, (c) = Cosigned By      Initials Name Provider Type    CE Elizabeth Maeyn, IVELISSE Occupational Therapist                   Goals/Plan    No documentation.                  Clinical Impression       Row Name 02/26/24 1237          Pain Assessment    Pretreatment Pain Rating 0/10 - no pain  -CE     Posttreatment Pain Rating 0/10 - no pain  -CE       Row Name 02/26/24 1237          Plan of Care Review    Plan of Care Reviewed With patient  -CE     Outcome Evaluation Pt seen for OT tx focusing on functional transfers as well as grooming tasks seated supported in chair. Pt able to complete bed mobility with modA and STS with modA x 1 using B HHA. Able to complete stand-pivot transfer to chair with modA x 1 but unable to initiate steps safely 2/2 posterior lean. OT will con't to follow for stated goals and anticipate return to LTC.  -CE       Row Name 02/26/24 1237          Therapy Assessment/Plan (OT)    Criteria for Skilled Therapeutic Interventions Met (OT) yes;meets criteria  -CE       Row Name 02/26/24 1237          Therapy Plan Review/Discharge Plan (OT)    Anticipated Discharge Disposition (OT) extended care facility  -CE       Row Name 02/26/24 1237          Vital Signs    O2 Delivery Pre Treatment room air  -CE     O2 Delivery Intra Treatment room air  -CE     O2 Delivery Post Treatment room air  -CE     Pre Patient Position Supine  -CE     Intra Patient Position Standing  -CE     Post Patient Position Sitting  -CE       Row Name 02/26/24 1237          Positioning and Restraints    Pre-Treatment Position in bed  -CE     Post Treatment Position chair  -CE     In Chair notified nsg;reclined;call light within reach;encouraged to call for assist;exit alarm on  -CE               User Key  (r) = Recorded By, (t) = Taken By, (c) = Cosigned By      Initials  Name Provider Type    Elizabeth Pryor OT Occupational Therapist                   Outcome Measures       Row Name 02/26/24 1241          How much help from another is currently needed...    Putting on and taking off regular lower body clothing? 2  -CE     Bathing (including washing, rinsing, and drying) 2  -CE     Toileting (which includes using toilet bed pan or urinal) 2  -CE     Putting on and taking off regular upper body clothing 2  -CE     Taking care of personal grooming (such as brushing teeth) 3  -CE     Eating meals 3  -CE     AM-PAC 6 Clicks Score (OT) 14  -CE       Row Name 02/26/24 1241          Functional Assessment    Outcome Measure Options AM-PAC 6 Clicks Daily Activity (OT)  -CE               User Key  (r) = Recorded By, (t) = Taken By, (c) = Cosigned By      Initials Name Provider Type    Elizabeth Pryor OT Occupational Therapist                    Occupational Therapy Education       Title: PT OT SLP Therapies (In Progress)       Topic: Occupational Therapy (Done)       Point: ADL training (Done)       Description:   Instruct learner(s) on proper safety adaptation and remediation techniques during self care or transfers.   Instruct in proper use of assistive devices.                  Learning Progress Summary             Patient Acceptance, E,TB, VU,NR by  at 2/22/2024 1526    Comment: Instructed in role of OT, discharge planning, fall prevention, and use of call light for assistance. Patient will require further training for independence                         Point: Home exercise program (Done)       Description:   Instruct learner(s) on appropriate technique for monitoring, assisting and/or progressing therapeutic exercises/activities.                  Learning Progress Summary             Patient Acceptance, E,TB, VU,NR by  at 2/22/2024 1526    Comment: Instructed in role of OT, discharge planning, fall prevention, and use of call light for assistance. Patient will require  further training for independence                         Point: Precautions (Done)       Description:   Instruct learner(s) on prescribed precautions during self-care and functional transfers.                  Learning Progress Summary             Patient Acceptance, E,TB, VU,NR by  at 2/22/2024 1526    Comment: Instructed in role of OT, discharge planning, fall prevention, and use of call light for assistance. Patient will require further training for independence                         Point: Body mechanics (Done)       Description:   Instruct learner(s) on proper positioning and spine alignment during self-care, functional mobility activities and/or exercises.                  Learning Progress Summary             Patient Acceptance, E,TB, VU,NR by  at 2/22/2024 1526    Comment: Instructed in role of OT, discharge planning, fall prevention, and use of call light for assistance. Patient will require further training for independence                                         User Key       Initials Effective Dates Name Provider Type Discipline     08/31/23 -  Elvira Sherman, OT Occupational Therapist OT                  OT Recommendation and Plan     Plan of Care Review  Plan of Care Reviewed With: patient  Outcome Evaluation: Pt seen for OT tx focusing on functional transfers as well as grooming tasks seated supported in chair. Pt able to complete bed mobility with modA and STS with modA x 1 using B HHA. Able to complete stand-pivot transfer to chair with modA x 1 but unable to initiate steps safely 2/2 posterior lean. OT will con't to follow for stated goals and anticipate return to LTC.     Time Calculation:         Time Calculation- OT       Row Name 02/26/24 1242             Time Calculation- OT    OT Start Time 1029  -CE      OT Stop Time 1045  -CE      OT Time Calculation (min) 16 min  -CE      Total Timed Code Minutes- OT 16 minute(s)  -CE      OT Received On 02/26/24  -CE      OT - Next Appointment  02/29/24  -CE         Timed Charges    91590 - OT Self Care/Mgmt Minutes 16  -CE         Total Minutes    Timed Charges Total Minutes 16  -CE       Total Minutes 16  -CE                User Key  (r) = Recorded By, (t) = Taken By, (c) = Cosigned By      Initials Name Provider Type    Elizabeth Pryor OT Occupational Therapist                  Therapy Charges for Today       Code Description Service Date Service Provider Modifiers Qty    84737248015 HC OT SELF CARE/MGMT/TRAIN EA 15 MIN 2/26/2024 Elizabeth Mayen OT GO 1                 Elizabeth Mayen OT  2/26/2024

## 2024-02-26 NOTE — PLAN OF CARE
Goal Outcome Evaluation:  Plan of Care Reviewed With: patient           Outcome Evaluation: Pt agreed to PT session, slightly fatigued from just recently finishing OT session, pt liliana STS x2 w/rest break, MOD-MIN 2, improved w/second attempt, then pt able to take one step forw and back w/difficulty advancing LLE for incr amb distance, will offer trial of rwx next session as tolerated to improve amb efforts, plans back to facility at [place]      Anticipated Discharge Disposition (PT): extended care facility

## 2024-02-26 NOTE — SIGNIFICANT NOTE
02/26/24 1407   Post Acute Pre-Cert Documentation   Request Submitted by Facility - Type: Hospital   Post-Acute Authorization Type Submitted: SNF   Date Post Acute Pre-Cert Inititated per Facility 02/26/24   Accepting Facility [facility] Discharge Date Requested 02/26/24   All Clinicals Submitted? Yes   Had Accepting Facility at Time of Submission Yes   Response Communicated to: ;Accepting Facility Liaison   Authorization Number: [id number]   Post Acute Pre-Cert Initiated Comment SHANNON KIRAN

## 2024-02-26 NOTE — PLAN OF CARE
Goal Outcome Evaluation:  Plan of Care Reviewed With: patient           Outcome Evaluation: Pt seen for OT tx focusing on functional transfers as well as grooming tasks seated supported in chair. Pt able to complete bed mobility with modA and STS with modA x 1 using B HHA. Able to complete stand-pivot transfer to chair with modA x 1 but unable to initiate steps safely 2/2 posterior lean. OT will con't to follow for stated goals and anticipate return to LTC.      Anticipated Discharge Disposition (OT): extended care facility

## 2024-02-26 NOTE — THERAPY TREATMENT NOTE
Patient Name: Marcella Stephens  : 1923    MRN: [id number]                              Today's Date: 2024       Admit Date: 2024    Visit Dx:     ICD-10-CM ICD-9-CM   1. Acute anemia  D64.9 285.9   2. Generalized weakness  R53.1 780.79     Patient Active Problem List   Diagnosis    Closed fracture of right hip    Fall as cause of accidental injury at home as place of occurrence    Alzheimer's dementia    Anemia    Vitamin D deficiency    Weakness of right upper extremity    Acute retention of urine    Hydronephrosis    Acute deep vein thrombosis (DVT) of brachial vein of right upper extremity    Recent cerebrovascular accident (CVA)    DVT of lower extremity, bilateral    Acute UTI    History of DVT (deep vein thrombosis)    Age-related physical debility    Hiatal hernia    Acute anemia    Symptomatic anemia     Past Medical History:   Diagnosis Date    Acute embolism and thombos unsp deep veins of low extrm, bi     Dysphagia     oropharyngeal phase    Prediabetes     Stroke     TIA (transient ischemic attack)     Urinary tract infection     Vascular dementia without behavioral disturbance      Past Surgical History:   Procedure Laterality Date    BACK SURGERY      FEMUR IM NAILING/RODDING Right 2021    Procedure: FEMUR INTRAMEDULLARY NAILING/RODDING;  Surgeon: Louis Scruggs MD;  Location: [facility];  Service: Orthopedics;  Laterality: Right;    KNEE SURGERY        General Information       Row Name 24 9265          Physical Therapy Time and Intention    Document Type therapy note (daily note)  -NEIL     Mode of Treatment individual therapy;physical therapy  -       Row Name 24 9489          General Information    Patient Profile Reviewed yes  -NEIL     Existing Precautions/Restrictions fall  -       Row Name 24 9810          Living Environment    People in Home facility resident  -       Row Name 24 1334          Cognition    Orientation Status  (Cognition) oriented to;person;place  -       Row Name 02/26/24 1337          Safety Issues, Functional Mobility    Impairments Affecting Function (Mobility) balance;coordination;endurance/activity tolerance;strength  -               User Key  (r) = Recorded By, (t) = Taken By, (c) = Cosigned By      Initials Name Provider Type    Laura Gustafson PTA Physical Therapist Assistant                   Mobility       Row Name 02/26/24 1340          Bed Mobility    Comment, (Bed Mobility) up in chair  -       Row Name 02/26/24 1340          Sit-Stand Transfer    Sit-Stand [place] (Transfers) 2 person assist;moderate assist (50% patient effort);minimum assist (75% patient effort);verbal cues;nonverbal cues (demo/gesture)  -     Assistive Device (Sit-Stand Transfers) walker, front-wheeled  -     Comment, (Sit-Stand Transfer) HHA-2  -       Row Name 02/26/24 1340          Gait/Stairs (Locomotion)    [place] Level (Gait) 2 person assist  -     Assistive Device (Gait) --  HHA-2  -     Distance in Feet (Gait) --  1 step forw and back, difficulty advancing LLE for amb  -     Comment, (Gait/Stairs) pt reports not using AD, but may need to trial rwx to advance to ambulation  -               User Key  (r) = Recorded By, (t) = Taken By, (c) = Cosigned By      Initials Name Provider Type    Laura Gustafson PTA Physical Therapist Assistant                   Obj/Interventions       Row Name 02/26/24 1344          Motor Skills    Therapeutic Exercise --  APs, QS, hip abd/add, LAQS, seated MIP x10 reps  -               User Key  (r) = Recorded By, (t) = Taken By, (c) = Cosigned By      Initials Name Provider Type    Laura Gustafson PTA Physical Therapist Assistant                   Goals/Plan    No documentation.                  Clinical Impression       Row Name 02/26/24 1344          Pain    Pretreatment Pain Rating 0/10 - no pain  -     Posttreatment Pain Rating 0/10 - no pain  -        Row Name 02/26/24 1341          Plan of Care Review    Plan of Care Reviewed With patient  -     Outcome Evaluation Pt agreed to PT session, slightly fatigued from just recently finishing OT session, pt liliana STS x2 w/rest break, MOD-MIN 2, improved w/second attempt, then pt able to take one step forw and back w/difficulty advancing LLE for incr amb distance, will offer trial of rwx next session as tolerated to improve amb efforts, plans back to facility at [place]  -       Row Name 02/26/24 1344          Therapy Assessment/Plan (PT)    Rehab Potential (PT) fair, will monitor progress closely  -     Criteria for Skilled Interventions Met (PT) yes  -       Row Name 02/26/24 1344          Vital Signs    O2 Delivery Pre Treatment room air  -[facility] Name 02/26/24 1344          Positioning and Restraints    Pre-Treatment Position sitting in chair/recliner  -     Post Treatment Position chair  -     In Chair reclined;call light within reach;encouraged to call for assist;exit alarm on;notified nsg;heels elevated  -               User Key  (r) = Recorded By, (t) = Taken By, (c) = Cosigned By      Initials Name Provider Type    Laura Gustafson, CLARISSE Physical Therapist Assistant                   Outcome Measures       Row Name 02/26/24 1349          How much help from another person do you currently need...    Turning from your back to your side while in flat bed without using bedrails? 3  -JM     Moving from lying on back to sitting on the side of a flat bed without bedrails? 2  -JM     Moving to and from a bed to a chair (including a wheelchair)? 2  -JM     Standing up from a chair using your arms (e.g., wheelchair, bedside chair)? 2  -JM     Climbing 3-5 steps with a railing? 1  -JM     To walk in hospital room? 1  -JM     AM-PAC 6 Clicks Score (PT) 11  -     Highest Level of Mobility Goal 4 --> Transfer to chair/commode  -       Row Name 02/26/24 1241          Functional Assessment    Outcome  Measure Options AM-PAC 6 Clicks Daily Activity (OT)  -CE               User Key  (r) = Recorded By, (t) = Taken By, (c) = Cosigned By      Initials Name Provider Type    Laura Gustafson PTA Physical Therapist Assistant    Elizabeth Pryor OT Occupational Therapist                                 Physical Therapy Education       Title: PT OT SLP Therapies (Done)       Topic: Physical Therapy (Done)       Point: Mobility training (Done)       Learning Progress Summary             Patient Acceptance, E,D, VU,NR by  at 2/26/2024 1349    Acceptance, E, VU,NR by  at 2/22/2024 1617                         Point: Home exercise program (Done)       Learning Progress Summary             Patient Acceptance, E,D, VU,NR by  at 2/26/2024 1349                         Point: Body mechanics (Done)       Learning Progress Summary             Patient Acceptance, E,D, VU,NR by  at 2/26/2024 1349                         Point: Precautions (Done)       Learning Progress Summary             Patient Acceptance, E,D, VU,NR by  at 2/26/2024 1349                                         User Key       Initials Effective Dates Name Provider Type Discipline    EM 06/16/21 -  Bing Ambrosio, PT Physical Therapist PT     03/07/18 -  Laura Dorsey PTA Physical Therapist Assistant PT                  PT Recommendation and Plan     Plan of Care Reviewed With: patient  Outcome Evaluation: Pt agreed to PT session, slightly fatigued from just recently finishing OT session, pt liliana STS x2 w/rest break, MOD-MIN 2, improved w/second attempt, then pt able to take one step forw and back w/difficulty advancing LLE for incr amb distance, will offer trial of rwx next session as tolerated to improve amb efforts, plans back to facility at [place]     Time Calculation:         PT Charges       Row Name 02/26/24 1350             Time Calculation    Start Time 1134  -      Stop Time 1205  -      Time Calculation (min) 31 min  -       PT Received On 02/26/24  -NEIL      PT - Next Appointment 02/28/24  -NEIL                User Key  (r) = Recorded By, (t) = Taken By, (c) = Cosigned By      Initials Name Provider Type    Laura Gustafson PTA Physical Therapist Assistant                  Therapy Charges for Today       Code Description Service Date Service Provider Modifiers Qty    10743988400  PT THERAPEUTIC ACT EA 15 MIN 2/26/2024 Laura Dorsey, CLARISSE GP 1    [id number]  PT THER PROC EA 15 MIN 2/26/2024 Laura Dorsey PTA GP 1    [id number]  PT THER SUPP EA 15 MIN 2/26/2024 Laura Dorsey, CLARISSE GP 1            PT G-Codes  Outcome Measure Options: AM-PAC 6 Clicks Daily Activity (OT)  AM-PAC 6 Clicks Score (PT): 11  AM-PAC 6 Clicks Score (OT): 14  Modified Coats Scale: 4 - Moderately severe disability.  Unable to walk without assistance, and unable to attend to own bodily needs without assistance.  PT Discharge Summary  Anticipated Discharge Disposition (PT): extended care facility    Laura Dorsey PTA  2/26/2024

## 2024-02-26 NOTE — DISCHARGE SUMMARY
Discharge summary    Date of admission 2/21/2024  Date of discharge 2/26/2024    Final diagnosis  Symptomatic anemia  Acute on chronic iron deficiency anemia  Severe dementia  History of TIA/CVA  Osteoarthritis  Depression    Discharge medications    Current Facility-Administered Medications:     aspirin chewable tablet 81 mg, 81 mg, Oral, Daily, Delio Melchor MD, 81 mg at 02/26/24 0953    docusate sodium (COLACE) capsule 100 mg, 100 mg, Oral, BID, Delio Melchor MD, 100 mg at 02/26/24 0953    ferrous sulfate tablet 325 mg, 325 mg, Oral, Daily With Breakfast, Delio Melchor MD, 325 mg at 02/26/24 0954    galantamine (RAZADYNE) tablet 8 mg, 8 mg, Oral, Daily, Delio Melchor MD, 8 mg at 02/26/24 0954    mirtazapine (REMERON) tablet 15 mg, 15 mg, Oral, Nightly, Delio Melchor MD, 15 mg at 02/25/24 2018    multivitamin with minerals 1 tablet, 1 tablet, Oral, Daily, Delio Melchor MD, 1 tablet at 02/25/24 0900    pantoprazole (PROTONIX) EC tablet 40 mg, 40 mg, Oral, BID AC, Delio Melchor MD, 40 mg at 02/26/24 0624    polyethylene glycol (MIRALAX) packet 17 g, 17 g, Oral, Daily, Delio Melchor MD, 17 g at 02/26/24 0953    [COMPLETED] Insert Peripheral IV, , , Once **AND** sodium chloride 0.9 % flush 10 mL, 10 mL, Intravenous, PRN, Farhan Dorsey MD    traZODone (DESYREL) tablet 50 mg, 50 mg, Oral, Daily, Delio Melchor MD, 50 mg at 02/26/24 0954     Consults obtained  Nutrition    Procedures  None    Hospital course  89-year-old white female with history of severe dementia TIA/CVA chronic anemia osteoarthritis and depression who is a nursing home resident brought to the emergency room with symptomatic anemia.  Patient was on Eliquis with history of DVT.  Patient admitted received 2 units packed RBC and taken off from Eliquis and anemia workup revealed iron deficiency started on supplemental.  Patient restarted on aspirin and her hemoglobin remained stable.  Patient also was not eating good started on Remeron and eating  better.  Patient also is constipated which is resolved.  Patient remained DNR throughout hospital course.  Patient back to baseline and cleared for discharge.    Discharge diet regular    Activity per PT OT    Medication as above    Further care per accepting physician nursing home and take medication as instructed.    JO CMKEON MD

## 2024-02-26 NOTE — PROGRESS NOTES
"Daily progress note    Primary care physician      Subjective  Doing same with no new issues     History of present illness  70-year-old white female with history of severe dementia TIA CVA osteoarthritis and chronic anemia sent to the ER at [facility] with complaint of generalized weakness and laboratory showed significant anemia.  Patient is poor historian but denies any abdominal pain nausea vomiting black stools or blood in the stools.  Patient is on Eliquis for history of DVT.  Patient workup in ER revealed symptomatic anemia admitted for transfusion and anemia workup.  Patient is DNR per her wishes.    REVIEW OF SYSTEMS  Unremarkable except weakness    PHYSICAL EXAM  Blood pressure 118/64, pulse 88, temperature 98.6 °F (37 °C), temperature source Oral, resp. rate 16, height 157.5 cm (62\"), weight 49.4 kg (109 lb), SpO2 96%.    Constitutional:       General: She is not in acute distress.     Appearance: Normal appearance. She is not ill-appearing or toxic-appearing.   HENT:      Head: Normocephalic and atraumatic.   Eyes:      Extraocular Movements: Extraocular movements intact.      Pupils: Pupils are equal, round, and reactive to light.   Cardiovascular:      Rate and Rhythm: Normal rate and regular rhythm.      Heart sounds: No murmur heard.     No friction rub. No gallop.   Pulmonary:      Effort: Pulmonary effort is normal.      Breath sounds: Normal breath sounds.   Abdominal:      General: Abdomen is flat. There is no distension.      Palpations: Abdomen is soft.      Tenderness: There is no abdominal tenderness.   Musculoskeletal:         General: No swelling or tenderness. Normal range of motion.      Cervical back: Normal range of motion and neck supple.   Skin:     General: Skin is warm and dry.      Coloration: Skin is pale.   Neurological:      General: No focal deficit present.      Mental Status: She is alert and oriented to person, place, and time.      Sensory: No sensory " deficit.      Motor: No weakness.   Psychiatric:         Mood and Affect: Mood normal.         Behavior: Behavior normal.      LAB RESULTS  Lab Results (last 24 hours)       Procedure Component Value Units Date/Time    High Sensitivity Troponin T [[id number]]  (Abnormal) Collected: 02/26/24 0528    Specimen: Blood Updated: 02/26/24 0618     HS Troponin T 45 ng/L     Narrative:      High Sensitive Troponin T Reference Range:  <14.0 ng/L- Negative Female for AMI  <22.0 ng/L- Negative Male for AMI  >=14 - Abnormal Female indicating possible myocardial injury.  >=22 - Abnormal Male indicating possible myocardial injury.   Clinicians would have to utilize clinical acumen, EKG, Troponin, and serial changes to determine if it is an Acute Myocardial Infarction or myocardial injury due to an underlying chronic condition.         CBC & Differential [[id number]]  (Abnormal) Collected: 02/26/24 0528    Specimen: Blood Updated: 02/26/24 0602    Narrative:      The following orders were created for panel order CBC & Differential.  Procedure                               Abnormality         Status                     ---------                               -----------         ------                     CBC Auto Differential[[id number]]        Abnormal            Final result                 Please view results for these tests on the individual orders.    CBC Auto Differential [[id number]]  (Abnormal) Collected: 02/26/24 0528    Specimen: Blood Updated: 02/26/24 0602     WBC 6.73 10*3/mm3      RBC 4.25 10*6/mm3      Hemoglobin 8.9 g/dL      Hematocrit 31.2 %      MCV 73.4 fL      MCH 20.9 pg      MCHC 28.5 g/dL      RDW 24.6 %      RDW-SD 63.1 fl      MPV 9.3 fL      Platelets 247 10*3/mm3      Neutrophil % 50.3 %      Lymphocyte % 32.4 %      Monocyte % 11.0 %      Eosinophil % 4.8 %      Basophil % 1.2 %      Neutrophils, Absolute 3.39 10*3/mm3      Lymphocytes, Absolute 2.18 10*3/mm3      Monocytes, Absolute 0.74 10*3/mm3       Eosinophils, Absolute 0.32 10*3/mm3      Basophils, Absolute 0.08 10*3/mm3           Imaging Results (Last 24 Hours)       ** No results found for the last 24 hours. **            Current Facility-Administered Medications:     aspirin chewable tablet 81 mg, 81 mg, Oral, Daily, Jo Melchor MD, 81 mg at 02/26/24 0953    bisacodyl (DULCOLAX) EC tablet 10 mg, 10 mg, Oral, Daily PRN, Jo Melchor MD    docusate sodium (COLACE) capsule 100 mg, 100 mg, Oral, BID, Jo Melchor MD, 100 mg at 02/26/24 0953    ferrous sulfate tablet 325 mg, 325 mg, Oral, Daily With Breakfast, Jo Melchor MD, 325 mg at 02/26/24 0954    galantamine (RAZADYNE) tablet 8 mg, 8 mg, Oral, Daily, Jo Melchor MD, 8 mg at 02/26/24 0954    melatonin tablet 3 mg, 3 mg, Oral, Nightly PRN, Jo Melchor MD    mirtazapine (REMERON) tablet 15 mg, 15 mg, Oral, Nightly, Jo Melchor MD, 15 mg at 02/25/24 2018    multivitamin with minerals 1 tablet, 1 tablet, Oral, Daily, Jo Melchor MD, 1 tablet at 02/25/24 0900    pantoprazole (PROTONIX) EC tablet 40 mg, 40 mg, Oral, BID AC, Jo Melchor MD, 40 mg at 02/26/24 0624    polyethylene glycol (MIRALAX) packet 17 g, 17 g, Oral, Daily, Jo Melchor MD, 17 g at 02/26/24 0953    [COMPLETED] Insert Peripheral IV, , , Once **AND** sodium chloride 0.9 % flush 10 mL, 10 mL, Intravenous, PRN, Farhan Dorsey MD    traZODone (DESYREL) tablet 50 mg, 50 mg, Oral, Daily, Jo Melchor MD, 50 mg at 02/26/24 0954     ASSESSMENT  Symptomatic anemia  Acute on chronic anemia  Severe dementia  Osteoarthritis  Depression    PLAN  Discharge back to nursing home  Discharge summary dictated    JO MELCHOR MD    Copied text in this note has been reviewed and is accurate as of 02/26/24

## 2024-02-26 NOTE — PLAN OF CARE
Goal Outcome Evaluation:         Patient discharging back to [facility] via EMS at 6:00 PM.

## 2024-02-26 NOTE — CASE MANAGEMENT/SOCIAL WORK
Continued Stay Note  [facility]     Patient Name: Marcella Stephens  MRN: [id number]  Today's Date: 2/26/2024    Admit Date: 2/21/2024    Plan: Return back to [facility] Via ems at 5pm today   Discharge Plan       Row Name 02/26/24 1635       Plan    Plan Return back to [facility] Via ems at 5pm today    Patient/Family in Agreement with Plan yes    Plan Comments DC orders noted. Spoke with [facility], pickup scheduled for 5pm today. Notified [facility] ok for dc time, and requests [facility] pre-cert for pt return. Pre-cert request submitted to [facility] pre-cert team and await approval. CCP called pts dtr and POLEANNE Gould regarding dc time. Luis Fernando states since pts dtr is in rehab at [facility] and her current condition he is acting under pts POA document from 2022. CCP requested copy and he will email this CCP a copy of POA for pts chart. Awre of dc time. Packet in cubby. clari gonzalez/ccp                   Discharge Codes    No documentation.                 Expected Discharge Date and Time       Expected Discharge Date Expected Discharge Time    Feb 26, 2024               Rosario Amaya, RN

## 2024-02-26 NOTE — CASE MANAGEMENT/SOCIAL WORK
Continued Stay Note  [facility]     Patient Name: Marcella Stephens  MRN: [id number]  Today's Date: 2/26/2024    Admit Date: 2/21/2024    Plan: [facility] SNF VIA [facility] AT 5PM   Discharge Plan       Row Name 02/26/24 1641       Plan    Plan [facility] SNF VIA [facility] AT 5PM    Plan Comments Spoke with [facility] pre-cert team, SNF approved. Notified [facility]. Jefferson RITTER/CCP    Final Discharge Disposition Code 03 - skilled nursing facility (SNF)    Final Note [facility] SN[facility] VIA [facility] AT 5PM      Row Name 02/26/24 1635       Plan    Plan Return back to [facility] Via ems at 5pm today    Patient/Family in Agreement with Plan yes    Plan Comments DC orders noted. Spoke with [facility], pickup scheduled for 5pm today. Notified [facility] ok for dc time, and requests SNF pre-cert for pt return. Pre-cert request submitted to [facility] pre-cert team and await approval. CCP called pts dtr and DONA Gould regarding dc time. Luis Fernando states since pts dtr is in rehab at [facility] and her current condition he is acting under pts POA document from 2022. CCP requested copy and he will email this CCP a copy of POA for pts chart. Awre of dc time. Packet in rey. jefferson ritter/ccp                   Discharge Codes    No documentation.                 Expected Discharge Date and Time       Expected Discharge Date Expected Discharge Time    Feb 26, 2024               Rosario Amaya RN

## 2024-02-26 NOTE — CASE MANAGEMENT/SOCIAL WORK
Continued Stay Note  [facility]     Patient Name: Marcella Stephens  MRN: [id number]  Today's Date: 2/26/2024    Admit Date: 2/21/2024    Plan: [facility] SNF VIA [facility] AT 5PM   Discharge Plan       Row Name 02/26/24 1700       Plan    Plan Comments Recieved POA document and scanned into [employer]. jefefrson [email or web address]/ccp      Row Name 02/26/24 1641       Plan    Plan [facility] SNF VIA [facility] AT 5PM    Plan Comments Spoke with [facility] pre-cert team, SNF approved. Notified [facility]. Jefferson RN/CCP    Final Discharge Disposition Code 03 - skilled nursing facility (SNF)    Final Note [facility] SN[facility] VIA [facility] AT 5PM      Row Name 02/26/24 1635       Plan    Plan Return back to [facility] Via ems at 5pm today    Patient/Family in Agreement with Plan yes    Plan Comments DC orders noted. Spoke with [facility], pickup scheduled for 5pm today. Notified [facility] ok for dc time, and requests SNF pre-cert for pt return. Pre-cert request submitted to [facility] pre-cert team and await approval. CCP called pts dtr and DONA Gould regarding dc time. Luis Fernando states since pts dtr is in rehab at [facility] and her current condition he is acting under pts POA document from 2022. CCP requested copy and he will email this CCP a copy of POA for pts chart. Awre of dc time. Packet in [email or web address]. jefferson [email or web address]/ccp                   Discharge Codes    No documentation.                 Expected Discharge Date and Time       Expected Discharge Date Expected Discharge Time    Feb 26, 2024               Rosario Amaya RN

## 2024-02-26 NOTE — SIGNIFICANT NOTE
"   02/26/24 0750   OTHER   Discipline speech language pathologist   Rehab Time/Intention   Session Not Performed other (see comments)  (Discussed with RN. New consult received secondary to patient with regurgitation s/p all PO. Per previous eval and patient history - suspected esophageal dysphagia and per DC summary of 10/2021 admission \"intermittent episodes of nausea and regurgitation.\")       "

## 2024-02-26 NOTE — SIGNIFICANT NOTE
02/26/24 1545   Post Acute Pre-Cert Documentation   Request Submitted by Facility - Type: Hospital   Post-Acute Authorization Type Submitted: SNF   Date Post Acute Pre-Cert Inititated per Facility 02/26/24   Date Post Acute Pre-Cert Completed 02/26/24   Accepting Facility [facility] Discharge Date Requested 02/26/24   All Clinicals Submitted? Yes   Had Accepting Facility at Time of Submission Yes   Response Received from Insurance? Approval   Response Communicated to: ;Accepting Facility Liaison;Accepting Facility Auth Department   Authorization Number: [id number]   Post Acute Pre-Cert Initiated Comment SHANNON KIRAN

## (undated) DEVICE — VITAL SIGNS™ ADULT ANESTHESIA BREATHING CIRCUIT: Brand: VITAL SIGNS™

## (undated) DEVICE — SUT MNCRYL 2/0 SH 27IN Y417H

## (undated) DEVICE — DRP C/ARMOR

## (undated) DEVICE — GLV SURG SENSICARE PI LF PF 8 GRN STRL

## (undated) DEVICE — PK HIP PINNING 40

## (undated) DEVICE — NEEDLE, QUINCKE, 20GX3.5": Brand: MEDLINE

## (undated) DEVICE — STPLR SKIN VISISTAT WD 35CT

## (undated) DEVICE — ADAPT CLIP: Brand: GAMMA

## (undated) DEVICE — DRAPE,REIN 53X77,STERILE: Brand: MEDLINE

## (undated) DEVICE — SPNG GZ WOVN 4X4IN 12PLY 10/BX STRL

## (undated) DEVICE — APPL DURAPREP IODOPHOR APL 26ML

## (undated) DEVICE — SUT VIC 2/0 CT2 27IN J269H

## (undated) DEVICE — DRSNG GZ PETROLTM XEROFORM CURAD 1X8IN STRL

## (undated) DEVICE — 1010 S-DRAPE TOWEL DRAPE 10/BX: Brand: STERI-DRAPE™

## (undated) DEVICE — SOL ISO/ALC RUB 70PCT 4OZ

## (undated) DEVICE — SUT VIC 0 CT1 36IN J946H

## (undated) DEVICE — MAT FLR ABSORBENT LG 4FT 10 2.5FT

## (undated) DEVICE — GUIDE WIRE, BALL-TIPPED, STERILE

## (undated) DEVICE — GLV SURG BIOGEL LTX PF 8

## (undated) DEVICE — DRILL, AO, STERILE

## (undated) DEVICE — SUT ETHLN 3/0 PSL BLK MONO SA 30IN 1691H

## (undated) DEVICE — 3M™ IOBAN™ 2 ANTIMICROBIAL INCISE DRAPE 6640EZ: Brand: IOBAN™ 2

## (undated) DEVICE — TRAP FLD MINIVAC MEGADYNE 100ML

## (undated) DEVICE — DRSNG SURESITE WNDW 4X4.5